# Patient Record
Sex: FEMALE | Race: BLACK OR AFRICAN AMERICAN | NOT HISPANIC OR LATINO | Employment: FULL TIME | URBAN - METROPOLITAN AREA
[De-identification: names, ages, dates, MRNs, and addresses within clinical notes are randomized per-mention and may not be internally consistent; named-entity substitution may affect disease eponyms.]

---

## 2017-01-03 ENCOUNTER — LAB VISIT (OUTPATIENT)
Dept: LAB | Facility: HOSPITAL | Age: 27
End: 2017-01-03
Attending: FAMILY MEDICINE
Payer: COMMERCIAL

## 2017-01-03 ENCOUNTER — OFFICE VISIT (OUTPATIENT)
Dept: FAMILY MEDICINE | Facility: CLINIC | Age: 27
End: 2017-01-03
Attending: FAMILY MEDICINE
Payer: COMMERCIAL

## 2017-01-03 VITALS
WEIGHT: 185.81 LBS | OXYGEN SATURATION: 98 % | DIASTOLIC BLOOD PRESSURE: 76 MMHG | HEIGHT: 65 IN | BODY MASS INDEX: 30.96 KG/M2 | HEART RATE: 95 BPM | SYSTOLIC BLOOD PRESSURE: 110 MMHG

## 2017-01-03 DIAGNOSIS — E04.1 THYROID NODULE: ICD-10-CM

## 2017-01-03 DIAGNOSIS — E04.1 THYROID NODULE: Primary | ICD-10-CM

## 2017-01-03 DIAGNOSIS — E55.9 VITAMIN D DEFICIENCY: ICD-10-CM

## 2017-01-03 PROCEDURE — 1159F MED LIST DOCD IN RCRD: CPT | Mod: S$GLB,,, | Performed by: FAMILY MEDICINE

## 2017-01-03 PROCEDURE — 36415 COLL VENOUS BLD VENIPUNCTURE: CPT | Mod: PO

## 2017-01-03 PROCEDURE — 99999 PR PBB SHADOW E&M-EST. PATIENT-LVL III: CPT | Mod: PBBFAC,,, | Performed by: FAMILY MEDICINE

## 2017-01-03 PROCEDURE — 84439 ASSAY OF FREE THYROXINE: CPT

## 2017-01-03 PROCEDURE — 84443 ASSAY THYROID STIM HORMONE: CPT

## 2017-01-03 PROCEDURE — 99213 OFFICE O/P EST LOW 20 MIN: CPT | Mod: S$GLB,,, | Performed by: FAMILY MEDICINE

## 2017-01-03 RX ORDER — VIT C/E/ZN/COPPR/LUTEIN/ZEAXAN 250MG-90MG
1000 CAPSULE ORAL DAILY
Refills: 0 | COMMUNITY
Start: 2017-01-03 | End: 2017-08-04 | Stop reason: ALTCHOICE

## 2017-01-03 NOTE — PROGRESS NOTES
"Subjective:       Patient ID: Jessica Garcia is a 26 y.o. female.    Chief Complaint: Follow-up (thyroid)    HPI     The patient returns for followup, from her visit in May.  At that time, I recommended additional labs, and a thyroid US.  She finally scheduled these tests for last week.  The thyroid US revealed a nodule in the midpole of the right thyroid lobe measuring 1.5 x 1.3 x 1.5 cm.  Lab studies showed a low vitamin D level.    The following portions of the patient's history were reviewed and updated as appropriate: allergies, current medications, past family history, past medical history, past social history, past surgical history and problem list.      Review of Systems   Constitutional: Positive for fatigue and unexpected weight change.   Respiratory: Negative for chest tightness and shortness of breath.    Cardiovascular: Negative for chest pain, palpitations and leg swelling.   Endocrine: Positive for heat intolerance. Negative for cold intolerance.   Neurological: Positive for dizziness. Negative for light-headedness and headaches.   Psychiatric/Behavioral: Negative for sleep disturbance. The patient is not nervous/anxious.          Objective:      Physical Exam   Constitutional: She appears well-developed.   HENT:   Head: Normocephalic and atraumatic.   Eyes: Conjunctivae are normal. No scleral icterus.   Neck: Neck supple. Thyromegaly present.   Lymphadenopathy:     She has cervical adenopathy (mild, submandibular ).   Neurological: She is alert.   Skin: Skin is warm and dry.   Psychiatric: She has a normal mood and affect.   Vitals reviewed.      Assessment:       1. Thyroid nodule    2. Vitamin D deficiency        Plan:       Refer to Endocrine Surgery for FNA.  VitD supplement (1000u daily).  Repeat TFTs today.    "This note will not be shared with the patient."  "

## 2017-01-04 ENCOUNTER — PATIENT MESSAGE (OUTPATIENT)
Dept: FAMILY MEDICINE | Facility: CLINIC | Age: 27
End: 2017-01-04

## 2017-01-04 LAB
T4 FREE SERPL-MCNC: 1.01 NG/DL
TSH SERPL DL<=0.005 MIU/L-ACNC: 0.79 UIU/ML

## 2017-01-05 ENCOUNTER — PATIENT MESSAGE (OUTPATIENT)
Dept: FAMILY MEDICINE | Facility: CLINIC | Age: 27
End: 2017-01-05

## 2017-01-06 DIAGNOSIS — E04.1 THYROID NODULE: Primary | ICD-10-CM

## 2017-01-06 NOTE — TELEPHONE ENCOUNTER
Any new patient trying to see Dr Gamez has to forward a message to her nurse which I've already done.

## 2017-01-06 NOTE — TELEPHONE ENCOUNTER
Please facilitate an urgent appointment with Dr. Villegas.  The patient has a thyroid nodule, needs a fine needle aspiration performed very soon.

## 2017-01-11 ENCOUNTER — TELEPHONE (OUTPATIENT)
Dept: SURGERY | Facility: CLINIC | Age: 27
End: 2017-01-11

## 2017-01-13 ENCOUNTER — TELEPHONE (OUTPATIENT)
Dept: FAMILY MEDICINE | Facility: CLINIC | Age: 27
End: 2017-01-13

## 2017-01-19 ENCOUNTER — TELEPHONE (OUTPATIENT)
Dept: SURGERY | Facility: CLINIC | Age: 27
End: 2017-01-19

## 2017-02-07 ENCOUNTER — OFFICE VISIT (OUTPATIENT)
Dept: FAMILY MEDICINE | Facility: CLINIC | Age: 27
End: 2017-02-07
Attending: FAMILY MEDICINE
Payer: COMMERCIAL

## 2017-02-07 VITALS
WEIGHT: 185.88 LBS | HEIGHT: 65 IN | TEMPERATURE: 99 F | SYSTOLIC BLOOD PRESSURE: 110 MMHG | HEART RATE: 107 BPM | DIASTOLIC BLOOD PRESSURE: 74 MMHG | OXYGEN SATURATION: 98 % | BODY MASS INDEX: 30.97 KG/M2

## 2017-02-07 DIAGNOSIS — J02.9 ACUTE PHARYNGITIS, UNSPECIFIED ETIOLOGY: Primary | ICD-10-CM

## 2017-02-07 PROCEDURE — 87070 CULTURE OTHR SPECIMN AEROBIC: CPT

## 2017-02-07 PROCEDURE — 99999 PR PBB SHADOW E&M-EST. PATIENT-LVL III: CPT | Mod: PBBFAC,,, | Performed by: FAMILY MEDICINE

## 2017-02-07 PROCEDURE — 99213 OFFICE O/P EST LOW 20 MIN: CPT | Mod: S$GLB,,, | Performed by: FAMILY MEDICINE

## 2017-02-07 RX ORDER — AMOXICILLIN 500 MG/1
1000 TABLET, FILM COATED ORAL 2 TIMES DAILY
Qty: 40 TABLET | Refills: 0 | Status: SHIPPED | OUTPATIENT
Start: 2017-02-07 | End: 2017-02-17

## 2017-02-07 NOTE — PATIENT INSTRUCTIONS
Gargle with warm salt water several times a day to reduce throat swelling and pain. Mix 1 teaspoon of salt in 8 ounces of water.   Drink extra fluids to soothe your throat. Honey or lemon in weak tea may help.   Get plenty of rest.   Takeover-the-counter medicine such as aspirin or ibuprofen to relieve pain and reduce fever.   Try throat lozenges that have a painkiller to numb your throat, such as Sucrets Maximum Strength. Regular cough drops may also help.   Try a decongestant. It can make breathing easier and relieve a runny nose and postnasal drip, which can make your throat hurt.       Call if:  * A high, prolonged fever (above 102 degrees)  * Symptoms that last for more than 10 days or get worse instead of better  * Trouble breathing or shortness of breath  * Pain or pressure in the chest  * Fainting or feeling like you are about to faint  * Confusion or disorientation  * Severe or persistent vomiting  * Severe pain in your face or forehead  * Hoarseness, sore throat or a cough that won't go away after 10 days

## 2017-02-07 NOTE — PROGRESS NOTES
"  Subjective:       Jessica Garcia is a 26 y.o. female who presents for evaluation of sinus pain. Symptoms include: congestion, cough, facial pain, nasal congestion, post nasal drip, sinus pressure, sneezing and sore throat. Onset of symptoms was 3 days ago. Symptoms have been gradually worsening since that time. Past history is significant for no history of pneumonia or bronchitis. Patient is a former smoker.    The following portions of the patient's history were reviewed and updated as appropriate: allergies, current medications, past family history, past medical history, past social history, past surgical history and problem list.      Review of Systems  Pertinent items are noted in HPI.     Objective:        Visit Vitals    /74 (BP Location: Right arm, Patient Position: Sitting, BP Method: Manual)    Pulse 107    Temp 98.8 °F (37.1 °C) (Oral)    Ht 5' 5" (1.651 m)    Wt 84.3 kg (185 lb 14.4 oz)    SpO2 98%    BMI 30.94 kg/m2     General appearance: alert, appears stated age and cooperative  Head: Normocephalic, without obvious abnormality, atraumatic  Ears: abnormal TM right ear - diminished mobility, erythematous and bulging and abnormal TM left ear - diminished mobility, erythematous and bulging  Nose: turbinates pink, swollen, edematous  Throat: abnormal findings: moderate oropharyngeal erythema and strawberry tongue  Neck: marked anterior cervical adenopathy, no carotid bruit, no JVD, supple, symmetrical, trachea midline and thyroid not enlarged, symmetric, no tenderness/mass/nodules  Lungs: clear to auscultation bilaterally  Heart: regular rate and rhythm, S1, S2 normal, no murmur, click, rub or gallop      Assessment:       No diagnosis found.  .      Plan:      Nasal saline sprays.    Amoxicillin per orders.  Await C+S.  DayQuil/NyQuil OTC.  Drink plenty fluids.  Rest; may return to work in 2 days if improves with treatment.  Follow-up by phone/email in 2 days.      "

## 2017-02-10 ENCOUNTER — PATIENT MESSAGE (OUTPATIENT)
Dept: FAMILY MEDICINE | Facility: CLINIC | Age: 27
End: 2017-02-10

## 2017-02-10 LAB — BACTERIA THROAT CULT: NORMAL

## 2017-07-15 ENCOUNTER — PATIENT MESSAGE (OUTPATIENT)
Dept: FAMILY MEDICINE | Facility: CLINIC | Age: 27
End: 2017-07-15

## 2017-07-18 NOTE — TELEPHONE ENCOUNTER
Would you like to address her concerns during her visit  on 8/3?  Or would you like her to schedule another appt since she has several issues that she is concerned about?

## 2017-08-04 ENCOUNTER — OFFICE VISIT (OUTPATIENT)
Dept: FAMILY MEDICINE | Facility: CLINIC | Age: 27
End: 2017-08-04
Attending: FAMILY MEDICINE
Payer: COMMERCIAL

## 2017-08-04 VITALS
WEIGHT: 180.69 LBS | HEART RATE: 101 BPM | HEIGHT: 65 IN | SYSTOLIC BLOOD PRESSURE: 116 MMHG | OXYGEN SATURATION: 97 % | DIASTOLIC BLOOD PRESSURE: 72 MMHG | BODY MASS INDEX: 30.1 KG/M2

## 2017-08-04 DIAGNOSIS — Z12.4 CERVICAL CANCER SCREENING: ICD-10-CM

## 2017-08-04 DIAGNOSIS — M51.26 DISC DISPLACEMENT, LUMBAR: ICD-10-CM

## 2017-08-04 DIAGNOSIS — M79.641 PAIN IN BOTH HANDS: ICD-10-CM

## 2017-08-04 DIAGNOSIS — Z01.419 ROUTINE GYNECOLOGICAL EXAMINATION: Primary | ICD-10-CM

## 2017-08-04 DIAGNOSIS — E78.00 HYPERCHOLESTEREMIA: ICD-10-CM

## 2017-08-04 DIAGNOSIS — M79.642 PAIN IN BOTH HANDS: ICD-10-CM

## 2017-08-04 DIAGNOSIS — N89.8 VAGINAL DISCHARGE: ICD-10-CM

## 2017-08-04 DIAGNOSIS — R53.83 FATIGUE, UNSPECIFIED TYPE: ICD-10-CM

## 2017-08-04 DIAGNOSIS — M54.50 CHRONIC RIGHT-SIDED LOW BACK PAIN WITHOUT SCIATICA: ICD-10-CM

## 2017-08-04 DIAGNOSIS — G89.29 CHRONIC RIGHT-SIDED LOW BACK PAIN WITHOUT SCIATICA: ICD-10-CM

## 2017-08-04 LAB
CANDIDA RRNA VAG QL PROBE: NEGATIVE
G VAGINALIS RRNA GENITAL QL PROBE: NEGATIVE
T VAGINALIS RRNA GENITAL QL PROBE: NEGATIVE

## 2017-08-04 PROCEDURE — 99395 PREV VISIT EST AGE 18-39: CPT | Mod: S$GLB,,, | Performed by: FAMILY MEDICINE

## 2017-08-04 PROCEDURE — 87660 TRICHOMONAS VAGIN DIR PROBE: CPT

## 2017-08-04 PROCEDURE — 99999 PR PBB SHADOW E&M-EST. PATIENT-LVL III: CPT | Mod: PBBFAC,,, | Performed by: FAMILY MEDICINE

## 2017-08-04 PROCEDURE — 3008F BODY MASS INDEX DOCD: CPT | Mod: S$GLB,,, | Performed by: FAMILY MEDICINE

## 2017-08-04 PROCEDURE — 88175 CYTOPATH C/V AUTO FLUID REDO: CPT

## 2017-08-04 PROCEDURE — 87480 CANDIDA DNA DIR PROBE: CPT

## 2017-08-04 NOTE — PROGRESS NOTES
"Subjective:       Patient ID: Jessica Garcia is a 26 y.o. female.    Chief Complaint: Gynecologic Exam    HPI     The patient presents to the office today requesting a routine gynecologic examination.    The following portions of the patient's history were reviewed and updated as appropriate: allergies, current medications, past family history, past medical history, past social history, past surgical history and problem list.    OB History      Para Term  AB Living    0 0 0 0 0 0    SAB TAB Ectopic Multiple Live Births    0 0 0 0          Obstetric Comments    Menarche age 13.   Menses absent on OCPs.  History of abnormal PAP smear: NO.  History of sexually transmitted disease:  NO              Review of Systems   Constitutional: Positive for fatigue.   Musculoskeletal: Positive for arthralgias and back pain (history of bulging lumbar disc).   Psychiatric/Behavioral: Positive for sleep disturbance (difficiculty with induction; rare early awakening).     Patient concerned, due to family history of "fibromyalgia."      Objective:      Physical Exam   Constitutional: She is oriented to person, place, and time. She appears well-developed and well-nourished. She is cooperative.   HENT:   Head: Normocephalic and atraumatic.   Nose: Nose normal.   Mouth/Throat: Oropharynx is clear and moist and mucous membranes are normal. No oropharyngeal exudate.   Eyes: Conjunctivae are normal. No scleral icterus.   Neck: Neck supple. No JVD present. Carotid bruit is not present. No thyromegaly present.   Cardiovascular: Normal rate, regular rhythm and normal pulses.  Exam reveals no gallop and no friction rub.    No murmur heard.  Pulmonary/Chest: Effort normal and breath sounds normal. She has no wheezes. She has no rhonchi. She has no rales.   Abdominal: Soft. Bowel sounds are normal. She exhibits no distension and no mass. There is no splenomegaly or hepatomegaly. There is no tenderness.   Genitourinary: Uterus " "normal. No breast tenderness or discharge. There is no rash or tenderness on the right labia. There is no rash or tenderness on the left labia. Cervix exhibits no motion tenderness and no discharge. Right adnexum displays no mass, no tenderness and no fullness. Left adnexum displays no mass, no tenderness and no fullness. No tenderness in the vagina. Vaginal discharge found.   Musculoskeletal: Normal range of motion. She exhibits no edema or tenderness.   Lymphadenopathy:     She has no cervical adenopathy.     She has no axillary adenopathy.   Neurological: She is alert and oriented to person, place, and time. She has normal strength and normal reflexes. No cranial nerve deficit or sensory deficit.   Skin: Skin is warm and dry.   Psychiatric: She has a normal mood and affect. Her speech is normal.   Vitals reviewed.        Assessment:       1. Routine gynecological examination    2. Cervical cancer screening    3. Vaginal discharge    4. Disc displacement, lumbar    5. Chronic right-sided low back pain without sciatica    6. Pain in both hands    7. Fatigue, unspecified type    8. Hypercholesteremia          Plan:       Await PAP results.  TdaP today.  Labs (see Orders).  Discussed home exercise program; will refer for PT evaluation.      "This note will not be shared with the patient."  "

## 2017-08-07 ENCOUNTER — LAB VISIT (OUTPATIENT)
Dept: LAB | Facility: HOSPITAL | Age: 27
End: 2017-08-07
Attending: FAMILY MEDICINE
Payer: COMMERCIAL

## 2017-08-07 DIAGNOSIS — M79.642 PAIN IN BOTH HANDS: ICD-10-CM

## 2017-08-07 DIAGNOSIS — E78.00 HYPERCHOLESTEREMIA: ICD-10-CM

## 2017-08-07 DIAGNOSIS — M79.641 PAIN IN BOTH HANDS: ICD-10-CM

## 2017-08-07 LAB
ERYTHROCYTE [SEDIMENTATION RATE] IN BLOOD BY WESTERGREN METHOD: 27 MM/HR
ESTIMATED AVG GLUCOSE: 105 MG/DL
HBA1C MFR BLD HPLC: 5.3 %
INSULIN COLLECTION INTERVAL: 1
INSULIN SERPL-ACNC: 14.6 UU/ML
RHEUMATOID FACT SERPL-ACNC: <10 IU/ML

## 2017-08-07 PROCEDURE — 86038 ANTINUCLEAR ANTIBODIES: CPT

## 2017-08-07 PROCEDURE — 80061 LIPID PANEL: CPT

## 2017-08-07 PROCEDURE — 83036 HEMOGLOBIN GLYCOSYLATED A1C: CPT

## 2017-08-07 PROCEDURE — 83704 LIPOPROTEIN BLD QUAN PART: CPT

## 2017-08-07 PROCEDURE — 86431 RHEUMATOID FACTOR QUANT: CPT

## 2017-08-07 PROCEDURE — 36415 COLL VENOUS BLD VENIPUNCTURE: CPT | Mod: PO

## 2017-08-07 PROCEDURE — 85651 RBC SED RATE NONAUTOMATED: CPT

## 2017-08-07 PROCEDURE — 83525 ASSAY OF INSULIN: CPT

## 2017-08-09 LAB
ANA SER QL IF: NORMAL
CHOLEST SERPL-MCNC: 228 MG/DL (ref 100–199)
HDL SERPL QN: 57 NM
HDL SERPL QN: 8.5 NM
HDL SERPL-SCNC: 22.3 UMOL/L
HDLC SERPL-MCNC: 37 MG/DL
HLD.LARGE SERPL-SCNC: <1.3 UMOL/L
LDL MED SERPL QN: 21.3 NM
LDL SERPL QN: 21.3 NM
LDL SERPL-SCNC: 2289 NMOL/L
LDL SMALL SERPL-SCNC: 1010 NMOL/L
LDL SMALL SERPL-SCNC: 1010 NMOL/L
LDLC SERPL CALC-MCNC: 168 MG/DL (ref 0–99)
TRIGL SERPL-MCNC: 114 MG/DL (ref 0–149)
VLDL LARGE SERPL-SCNC: 1.8 NMOL/L
VLDL SERPL QN: 44.1 NM

## 2017-08-10 ENCOUNTER — PATIENT MESSAGE (OUTPATIENT)
Dept: FAMILY MEDICINE | Facility: CLINIC | Age: 27
End: 2017-08-10

## 2017-08-10 DIAGNOSIS — Z91.89 FRAMINGHAM CARDIAC RISK <10% IN NEXT 10 YEARS: ICD-10-CM

## 2018-05-29 ENCOUNTER — TELEPHONE (OUTPATIENT)
Dept: FAMILY MEDICINE | Facility: CLINIC | Age: 28
End: 2018-05-29

## 2018-05-29 ENCOUNTER — OFFICE VISIT (OUTPATIENT)
Dept: URGENT CARE | Facility: CLINIC | Age: 28
End: 2018-05-29
Payer: COMMERCIAL

## 2018-05-29 VITALS
BODY MASS INDEX: 29.99 KG/M2 | DIASTOLIC BLOOD PRESSURE: 77 MMHG | OXYGEN SATURATION: 96 % | HEIGHT: 65 IN | SYSTOLIC BLOOD PRESSURE: 123 MMHG | TEMPERATURE: 98 F | HEART RATE: 114 BPM | WEIGHT: 180 LBS | RESPIRATION RATE: 18 BRPM

## 2018-05-29 DIAGNOSIS — J20.9 ACUTE BRONCHITIS, UNSPECIFIED ORGANISM: Primary | ICD-10-CM

## 2018-05-29 DIAGNOSIS — R05.9 COUGH: ICD-10-CM

## 2018-05-29 PROCEDURE — 99214 OFFICE O/P EST MOD 30 MIN: CPT | Mod: S$GLB,,, | Performed by: NURSE PRACTITIONER

## 2018-05-29 RX ORDER — NAPROXEN 500 MG/1
TABLET ORAL
COMMUNITY
Start: 2018-04-26 | End: 2018-09-14

## 2018-05-29 RX ORDER — CYCLOBENZAPRINE HCL 10 MG
10 TABLET ORAL
COMMUNITY
Start: 2018-04-26 | End: 2019-01-06

## 2018-05-29 RX ORDER — PROMETHAZINE HYDROCHLORIDE AND DEXTROMETHORPHAN HYDROBROMIDE 6.25; 15 MG/5ML; MG/5ML
5 SYRUP ORAL NIGHTLY PRN
Qty: 120 ML | Refills: 0 | Status: SHIPPED | OUTPATIENT
Start: 2018-05-29 | End: 2018-09-14

## 2018-05-29 RX ORDER — AZITHROMYCIN 250 MG/1
TABLET, FILM COATED ORAL
Qty: 6 TABLET | Refills: 0 | Status: SHIPPED | OUTPATIENT
Start: 2018-05-29 | End: 2018-09-14

## 2018-05-29 RX ORDER — PREDNISONE 20 MG/1
20 TABLET ORAL DAILY
Qty: 3 TABLET | Refills: 0 | Status: SHIPPED | OUTPATIENT
Start: 2018-05-29 | End: 2018-06-01

## 2018-05-29 NOTE — TELEPHONE ENCOUNTER
Patient called states compliant of productive cough. The states no complaint of fever. Patient states last Tuesday she had a 101.5 degree temperature. The patient states he took a tylenol and the fever broke. Due to lack of availability for the next few weeks with Dr. Cobian, my recommendation to the patient was to seek medical attention at one of [facility].

## 2018-05-30 NOTE — PROGRESS NOTES
"Subjective:       Patient ID: Jessica Garcia is a 27 y.o. female.    Vitals:    05/29/18 1952   BP: 123/77   Pulse: (!) 114   Resp: 18   Temp: 97.9 °F (36.6 °C)   TempSrc: Oral   SpO2: 96%   Weight: 81.6 kg (180 lb)   Height: 5' 5" (1.651 m)       Chief Complaint: Cough    Pt presents for a chest xray because she has had a bad cough. Advised by PCP.     The patient presents to the clinic today for a chest xray. She contacted her primary care doctor and he advised her to go to any urgent care to get a chest xray since he was unable to see her. She complains of unproductive cough x 1 week she claims "it feels like something is tickling my throat so it makes me cough." Denies fever or chills. She denies headaches or sinus congestion. She denies chest pain and SOB. Denies rashes.       Cough   This is a new problem. The current episode started in the past 7 days. The problem has been waxing and waning. The problem occurs constantly. The cough is productive of sputum. Pertinent negatives include no chest pain, chills, ear pain, eye redness, fever, headaches, myalgias, sore throat, shortness of breath or wheezing. Nothing aggravates the symptoms. Treatments tried: thera flu, throat spray, mucinex. The treatment provided no relief.     Review of Systems   Constitution: Negative for chills, fever and malaise/fatigue.   HENT: Negative for congestion, ear pain, hoarse voice and sore throat.    Eyes: Negative for discharge and redness.   Cardiovascular: Negative for chest pain, dyspnea on exertion and leg swelling.   Respiratory: Positive for cough and sputum production. Negative for shortness of breath and wheezing.    Musculoskeletal: Negative for myalgias.   Gastrointestinal: Negative for abdominal pain and nausea.   Neurological: Negative for headaches.       Objective:      Physical Exam   Constitutional: She is oriented to person, place, and time. She appears well-developed and well-nourished. She is " "cooperative.  Non-toxic appearance. She does not appear ill. No distress. She is not intubated.   HENT:   Head: Normocephalic and atraumatic.   Right Ear: Tympanic membrane, external ear and ear canal normal.   Left Ear: Tympanic membrane, external ear and ear canal normal.   Nose: Rhinorrhea present. No mucosal edema or nasal deformity. No epistaxis. Right sinus exhibits no maxillary sinus tenderness and no frontal sinus tenderness. Left sinus exhibits no maxillary sinus tenderness and no frontal sinus tenderness.   Mouth/Throat: Uvula is midline and mucous membranes are normal. Normal dentition. No uvula swelling. Posterior oropharyngeal erythema ("cobblestone appearance") present. No oropharyngeal exudate, posterior oropharyngeal edema or tonsillar abscesses. Tonsils are 1+ on the right. Tonsils are 1+ on the left. No tonsillar exudate.   Eyes: Conjunctivae and lids are normal. No scleral icterus.   Sclera clear bilat   Neck: Trachea normal, full passive range of motion without pain and phonation normal. Neck supple.   Cardiovascular: Normal rate, regular rhythm, normal heart sounds, intact distal pulses and normal pulses.    Pulmonary/Chest: Effort normal and breath sounds normal. No accessory muscle usage. No apnea, no tachypnea and no bradypnea. She is not intubated. No respiratory distress.           Abdominal: Normal appearance.   Musculoskeletal: Normal range of motion. She exhibits no edema or deformity.   Lymphadenopathy:        Head (right side): Submental and submandibular adenopathy present.        Head (left side): No submental and no submandibular adenopathy present.   Neurological: She is alert and oriented to person, place, and time. She exhibits normal muscle tone. Coordination normal.   Skin: Skin is warm, dry and intact. She is not diaphoretic. No pallor.   Psychiatric: She has a normal mood and affect. Her speech is normal and behavior is normal. Judgment and thought content normal. Cognition " and memory are normal.   Nursing note and vitals reviewed.        Reading Physician Reading Date Result Priority   Domenico Rodriguez MD 5/29/2018    Narrative     EXAMINATION:  XR CHEST PA AND LATERAL    CLINICAL HISTORY:  Cough    TECHNIQUE:  PA and lateral views of the chest were performed.    COMPARISON:  None    FINDINGS:  The trachea is unremarkable.  The cardiomediastinal silhouette is within normal limits.  The hemidiaphragms are unremarkable.  There is no evidence of free air beneath the hemidiaphragms.  There are no pleural effusions.  There is no evidence of a pneumothorax.  There is no evidence of pneumomediastinum.  No airspace opacities are present.  The osseous structures are unremarkable.      Impression       No acute process.       Assessment:       1. Acute bronchitis, unspecified organism    2. Cough        Plan:       Patient is advised that her chest xray was negative. Her lungs are CTA on exam. No wheezing present. She is placed on antibiotics and steroids. Given cough syrup for night. Also advised to start zyrtec and flonase. She is advised to follow up with her pcp.       Jessica was seen today for cough.    Diagnoses and all orders for this visit:    Acute bronchitis, unspecified organism  -     azithromycin (Z-ARIELLA) 250 MG tablet; Take 2 tablets by mouth on day 1; Take 1 tablet by mouth on days 2-5  -     predniSONE (DELTASONE) 20 MG tablet; Take 1 tablet (20 mg total) by mouth once daily.    Cough  -     X-Ray Chest PA And Lateral; Future  -     azithromycin (Z-ARIELLA) 250 MG tablet; Take 2 tablets by mouth on day 1; Take 1 tablet by mouth on days 2-5  -     predniSONE (DELTASONE) 20 MG tablet; Take 1 tablet (20 mg total) by mouth once daily.  -     promethazine-dextromethorphan (PROMETHAZINE-DM) 6.25-15 mg/5 mL Syrp; Take 5 mLs by mouth nightly as needed.      Patient Instructions     Bronchitis, Antibiotic Treatment (Adult)    Bronchitis is an infection of the air passages (bronchial tubes) in  your lungs. It often occurs when you have a cold. This illness is contagious during the first few days and is spread through the air by coughing and sneezing, or by direct contact (touching the sick person and then touching your own eyes, nose, or mouth).  Symptoms of bronchitis include cough with mucus (phlegm) and low-grade fever. Bronchitis usually lasts 7 to 14 days. Mild cases can be treated with simple home remedies. More severe infection is treated with an antibiotic.  Home care  Follow these guidelines when caring for yourself at home:  · If your symptoms are severe, rest at home for the first 2 to 3 days. When you go back to your usual activities, don't let yourself get too tired.  · Do not smoke. Also avoid being exposed to secondhand smoke.  · You may use over-the-counter medicines to control fever or pain, unless another medicine was prescribed. (Note: If you have chronic liver or kidney disease or have ever had a stomach ulcer or gastrointestinal bleeding, talk with your healthcare provider before using these medicines. Also talk to your provider if you are taking medicine to prevent blood clots.) Aspirin should never be given to anyone younger than 18 years of age who is ill with a viral infection or fever. It may cause severe liver or brain damage.  · Your appetite may be poor, so a light diet is fine. Avoid dehydration by drinking 6 to 8 glasses of fluids per day (such as water, soft drinks, sports drinks, juices, tea, or soup). Extra fluids will help loosen secretions in the nose and lungs.  · Over-the-counter cough, cold, and sore-throat medicines will not shorten the length of the illness, but they may be helpful to reduce symptoms. (Note: Do not use decongestants if you have high blood pressure.)  · Finish all antibiotic medicine. Do this even if you are feeling better after only a few days.  Follow-up care  Follow up with your healthcare provider, or as advised. If you had an X-ray or ECG  (electrocardiogram), a specialist will review it. You will be notified of any new findings that may affect your care.  Note: If you are age 65 or older, or if you have a chronic lung disease or condition that affects your immune system, or you smoke, talk to your healthcare provider about having pneumococcal vaccinations and a yearly influenza vaccination (flu shot).  When to seek medical advice  Call your healthcare provider right away if any of these occur:  · Fever of 100.4°F (38°C) or higher  · Coughing up increased amounts of colored sputum  · Weakness, drowsiness, headache, facial pain, ear pain, or a stiff neck  Call 911, or get immediate medical care  Contact emergency services right away if any of these occur.  · Coughing up blood  · Worsening weakness, drowsiness, headache, or stiff neck  · Trouble breathing, wheezing, or pain with breathing  Date Last Reviewed: 9/13/2015  © 5598-7847 [employer]. [address] [place] [address]. All rights reserved. This information is not intended as a substitute for professional medical care. Always follow your healthcare professional's instructions.      -As we discussed today your chest xray was negative.  -Start taking and complete the antibiotics that are prescribed.  -you have also been given 3 days of steroids.  -You may also take the cough syrup as needed at night for the cough.  -you may also take Claritin-d or Zyrtec-d as well as flonase.  -Follow up with PCP.  -If symptoms worsen go to the ER.

## 2018-05-30 NOTE — PATIENT INSTRUCTIONS
Bronchitis, Antibiotic Treatment (Adult)    Bronchitis is an infection of the air passages (bronchial tubes) in your lungs. It often occurs when you have a cold. This illness is contagious during the first few days and is spread through the air by coughing and sneezing, or by direct contact (touching the sick person and then touching your own eyes, nose, or mouth).  Symptoms of bronchitis include cough with mucus (phlegm) and low-grade fever. Bronchitis usually lasts 7 to 14 days. Mild cases can be treated with simple home remedies. More severe infection is treated with an antibiotic.  Home care  Follow these guidelines when caring for yourself at home:  · If your symptoms are severe, rest at home for the first 2 to 3 days. When you go back to your usual activities, don't let yourself get too tired.  · Do not smoke. Also avoid being exposed to secondhand smoke.  · You may use over-the-counter medicines to control fever or pain, unless another medicine was prescribed. (Note: If you have chronic liver or kidney disease or have ever had a stomach ulcer or gastrointestinal bleeding, talk with your healthcare provider before using these medicines. Also talk to your provider if you are taking medicine to prevent blood clots.) Aspirin should never be given to anyone younger than 18 years of age who is ill with a viral infection or fever. It may cause severe liver or brain damage.  · Your appetite may be poor, so a light diet is fine. Avoid dehydration by drinking 6 to 8 glasses of fluids per day (such as water, soft drinks, sports drinks, juices, tea, or soup). Extra fluids will help loosen secretions in the nose and lungs.  · Over-the-counter cough, cold, and sore-throat medicines will not shorten the length of the illness, but they may be helpful to reduce symptoms. (Note: Do not use decongestants if you have high blood pressure.)  · Finish all antibiotic medicine. Do this even if you are feeling better after only a  few days.  Follow-up care  Follow up with your healthcare provider, or as advised. If you had an X-ray or ECG (electrocardiogram), a specialist will review it. You will be notified of any new findings that may affect your care.  Note: If you are age 65 or older, or if you have a chronic lung disease or condition that affects your immune system, or you smoke, talk to your healthcare provider about having pneumococcal vaccinations and a yearly influenza vaccination (flu shot).  When to seek medical advice  Call your healthcare provider right away if any of these occur:  · Fever of 100.4°F (38°C) or higher  · Coughing up increased amounts of colored sputum  · Weakness, drowsiness, headache, facial pain, ear pain, or a stiff neck  Call 911, or get immediate medical care  Contact emergency services right away if any of these occur.  · Coughing up blood  · Worsening weakness, drowsiness, headache, or stiff neck  · Trouble breathing, wheezing, or pain with breathing  Date Last Reviewed: 9/13/2015  © 1279-4555 [employer]. [address] [place] [address]. All rights reserved. This information is not intended as a substitute for professional medical care. Always follow your healthcare professional's instructions.      -As we discussed today your chest xray was negative.  -Start taking and complete the antibiotics that are prescribed.  -you have also been given 3 days of steroids.  -You may also take the cough syrup as needed at night for the cough.  -you may also take Claritin-d or Zyrtec-d as well as flonase.  -Follow up with PCP.  -If symptoms worsen go to the ER.

## 2018-08-02 ENCOUNTER — PATIENT MESSAGE (OUTPATIENT)
Dept: FAMILY MEDICINE | Facility: CLINIC | Age: 28
End: 2018-08-02

## 2018-08-31 ENCOUNTER — PATIENT MESSAGE (OUTPATIENT)
Dept: FAMILY MEDICINE | Facility: CLINIC | Age: 28
End: 2018-08-31

## 2018-08-31 NOTE — TELEPHONE ENCOUNTER
Written report has been placed on Dr. Cobian's desk as well as Brittani REED mailed the disc to [employer].

## 2018-09-10 ENCOUNTER — PATIENT MESSAGE (OUTPATIENT)
Dept: FAMILY MEDICINE | Facility: CLINIC | Age: 28
End: 2018-09-10

## 2018-09-14 ENCOUNTER — OFFICE VISIT (OUTPATIENT)
Dept: FAMILY MEDICINE | Facility: CLINIC | Age: 28
End: 2018-09-14
Attending: FAMILY MEDICINE
Payer: COMMERCIAL

## 2018-09-14 VITALS
HEIGHT: 65 IN | SYSTOLIC BLOOD PRESSURE: 100 MMHG | OXYGEN SATURATION: 98 % | WEIGHT: 191.81 LBS | BODY MASS INDEX: 31.96 KG/M2 | HEART RATE: 93 BPM | DIASTOLIC BLOOD PRESSURE: 60 MMHG

## 2018-09-14 DIAGNOSIS — M46.1 SACROILIITIS: Primary | ICD-10-CM

## 2018-09-14 DIAGNOSIS — M70.61 GREATER TROCHANTERIC BURSITIS OF RIGHT HIP: ICD-10-CM

## 2018-09-14 DIAGNOSIS — G57.01 PIRIFORMIS SYNDROME, RIGHT: ICD-10-CM

## 2018-09-14 DIAGNOSIS — M41.9 SCOLIOSIS OF THORACOLUMBAR SPINE, UNSPECIFIED SCOLIOSIS TYPE: ICD-10-CM

## 2018-09-14 PROCEDURE — 99214 OFFICE O/P EST MOD 30 MIN: CPT | Mod: S$GLB,,, | Performed by: FAMILY MEDICINE

## 2018-09-14 PROCEDURE — 99999 PR PBB SHADOW E&M-EST. PATIENT-LVL III: CPT | Mod: PBBFAC,,, | Performed by: FAMILY MEDICINE

## 2018-09-14 PROCEDURE — 3008F BODY MASS INDEX DOCD: CPT | Mod: CPTII,S$GLB,, | Performed by: FAMILY MEDICINE

## 2018-09-14 RX ORDER — NAPROXEN 500 MG/1
500 TABLET ORAL 2 TIMES DAILY WITH MEALS
Qty: 30 TABLET | Refills: 1 | Status: SHIPPED | OUTPATIENT
Start: 2018-09-14 | End: 2019-01-06

## 2018-09-14 RX ORDER — IBUPROFEN 800 MG/1
800 TABLET ORAL
COMMUNITY
End: 2020-12-03

## 2018-09-14 RX ORDER — BACLOFEN 10 MG/1
10 TABLET ORAL 3 TIMES DAILY
Qty: 30 TABLET | Refills: 0 | Status: SHIPPED | OUTPATIENT
Start: 2018-09-14 | End: 2019-01-06

## 2018-09-14 NOTE — PROGRESS NOTES
"Subjective:       Patient ID: Jessica Garcia is a 27 y.o. female.    Chief Complaint: Back Pain    Back Pain   This is a chronic problem. The current episode started more than 1 year ago. The problem occurs intermittently. The problem has been waxing and waning since onset. The pain is present in the lumbar spine and gluteal. The quality of the pain is described as stabbing and burning. Radiates to: right buttock/hip. The pain is at a severity of 8/10. The pain is severe. The pain is the same all the time. The symptoms are aggravated by bending, position and twisting. Stiffness is present all day. Pertinent negatives include no bladder incontinence, bowel incontinence, dysuria, numbness, paresthesias, perianal numbness or tingling. Risk factors include obesity. She has tried ice, heat, home exercises and NSAIDs for the symptoms. The treatment provided mild relief.     As a teenager, she was diagnosed with a "bulging disc in my back."  No history of trauma.    Patient Active Problem List   Diagnosis    Thyroid nodule    Vitamin D deficiency    Disc displacement, lumbar    [place] cardiac risk <10% in next 10 years       Current Outpatient Medications:     cyclobenzaprine (FLEXERIL) 10 MG tablet, Take 10 mg by mouth as needed. , Disp: , Rfl:     ibuprofen (ADVIL,MOTRIN) 800 MG tablet, Take 800 mg by mouth as needed for Pain., Disp: , Rfl:     norethindrone-e.estradiol-iron (LO LOESTRIN FE) 1 mg-10 mcg (24)/10 mcg (2) Tab, Take by mouth., Disp: , Rfl:     The following portions of the patient's history were reviewed and updated as appropriate: allergies, past family history, past medical history, past social history and past surgical history.    Review of Systems   Gastrointestinal: Negative for bowel incontinence.   Genitourinary: Negative for bladder incontinence and dysuria.   Musculoskeletal: Positive for back pain.   Neurological: Negative for tingling, numbness and paresthesias.       Objective:    " "  /60 (BP Location: Left arm, Patient Position: Sitting, BP Method: Small (Manual))   Pulse 93   Ht 5' 5" (1.651 m)   Wt 87 kg (191 lb 12.8 oz)   SpO2 98%   BMI 31.92 kg/m²     Physical Exam   Constitutional: She is oriented to person, place, and time. She appears well-developed and well-nourished.   HENT:   Head: Normocephalic and atraumatic.   Musculoskeletal:        Right hip: She exhibits tenderness.        Thoracic back: She exhibits deformity (Mild rotational scoliosis) and spasm (paravertebral mm).        Lumbar back: She exhibits bony tenderness (right SI joint) and pain. She exhibits normal range of motion and no spasm.   Mildly positive FADIR and ANTHONY tests.  Pain on piriformis stretch.  Tender over right greater trochanteric bursa.   Neurological: She is alert and oriented to person, place, and time.   Skin: Skin is warm and dry.   Psychiatric: She has a normal mood and affect.   Vitals reviewed.        Assessment:       1. Sacroiliitis    2. Scoliosis of thoracolumbar spine, unspecified scoliosis type    3. Greater trochanteric bursitis of right hip    4. Piriformis syndrome, right        Plan:       Declines TdaP and flu vaccines.  Trial of baclofen and naproxen.  Refer to PT.        "This note will not be shared with the patient."  "

## 2019-01-06 ENCOUNTER — HOSPITAL ENCOUNTER (EMERGENCY)
Facility: HOSPITAL | Age: 29
Discharge: HOME | End: 2019-01-06
Attending: EMERGENCY MEDICINE
Payer: COMMERCIAL

## 2019-01-06 VITALS
HEIGHT: 65 IN | SYSTOLIC BLOOD PRESSURE: 117 MMHG | OXYGEN SATURATION: 95 % | WEIGHT: 180 LBS | TEMPERATURE: 99 F | HEART RATE: 100 BPM | RESPIRATION RATE: 20 BRPM | BODY MASS INDEX: 29.99 KG/M2 | DIASTOLIC BLOOD PRESSURE: 70 MMHG

## 2019-01-06 DIAGNOSIS — J11.1 INFLUENZA: Primary | ICD-10-CM

## 2019-01-06 DIAGNOSIS — R05.9 COUGH: ICD-10-CM

## 2019-01-06 DIAGNOSIS — R50.9 FEVER: ICD-10-CM

## 2019-01-06 DIAGNOSIS — R07.9 CHEST PAIN: ICD-10-CM

## 2019-01-06 LAB
ALBUMIN SERPL BCP-MCNC: 3.5 G/DL
ALP SERPL-CCNC: 44 U/L
ALT SERPL W/O P-5'-P-CCNC: 26 U/L
ANION GAP SERPL CALC-SCNC: 11 MMOL/L
AST SERPL-CCNC: 28 U/L
B-HCG UR QL: NEGATIVE
BACTERIA #/AREA URNS AUTO: NORMAL /HPF
BASOPHILS # BLD AUTO: 0.03 K/UL
BASOPHILS NFR BLD: 0.5 %
BILIRUB SERPL-MCNC: 0.4 MG/DL
BILIRUB UR QL STRIP: NEGATIVE
BUN SERPL-MCNC: 7 MG/DL
CALCIUM SERPL-MCNC: 9.3 MG/DL
CHLORIDE SERPL-SCNC: 105 MMOL/L
CLARITY UR REFRACT.AUTO: ABNORMAL
CO2 SERPL-SCNC: 23 MMOL/L
COLOR UR AUTO: YELLOW
CREAT SERPL-MCNC: 0.7 MG/DL
CTP QC/QA: YES
DIFFERENTIAL METHOD: ABNORMAL
EOSINOPHIL # BLD AUTO: 0 K/UL
EOSINOPHIL NFR BLD: 0.3 %
ERYTHROCYTE [DISTWIDTH] IN BLOOD BY AUTOMATED COUNT: 12.9 %
EST. GFR  (AFRICAN AMERICAN): >60 ML/MIN/1.73 M^2
EST. GFR  (NON AFRICAN AMERICAN): >60 ML/MIN/1.73 M^2
GLUCOSE SERPL-MCNC: 98 MG/DL
GLUCOSE UR QL STRIP: NEGATIVE
HCT VFR BLD AUTO: 41.6 %
HGB BLD-MCNC: 13.4 G/DL
HGB UR QL STRIP: ABNORMAL
HYALINE CASTS UR QL AUTO: 0 /LPF
IMM GRANULOCYTES # BLD AUTO: 0.04 K/UL
IMM GRANULOCYTES NFR BLD AUTO: 0.6 %
KETONES UR QL STRIP: NEGATIVE
LACTATE SERPL-SCNC: 0.9 MMOL/L
LEUKOCYTE ESTERASE UR QL STRIP: NEGATIVE
LYMPHOCYTES # BLD AUTO: 0.4 K/UL
LYMPHOCYTES NFR BLD: 6.2 %
MCH RBC QN AUTO: 30 PG
MCHC RBC AUTO-ENTMCNC: 32.2 G/DL
MCV RBC AUTO: 93 FL
MICROSCOPIC COMMENT: NORMAL
MONOCYTES # BLD AUTO: 0.7 K/UL
MONOCYTES NFR BLD: 10.9 %
NEUTROPHILS # BLD AUTO: 5.2 K/UL
NEUTROPHILS NFR BLD: 81.5 %
NITRITE UR QL STRIP: NEGATIVE
NRBC BLD-RTO: 0 /100 WBC
PH UR STRIP: 5 [PH] (ref 5–8)
PLATELET # BLD AUTO: 335 K/UL
PMV BLD AUTO: 9.7 FL
POTASSIUM SERPL-SCNC: 3.7 MMOL/L
PROT SERPL-MCNC: 8 G/DL
PROT UR QL STRIP: ABNORMAL
RBC # BLD AUTO: 4.46 M/UL
RBC #/AREA URNS AUTO: 2 /HPF (ref 0–4)
SODIUM SERPL-SCNC: 139 MMOL/L
SP GR UR STRIP: 1.03 (ref 1–1.03)
SQUAMOUS #/AREA URNS AUTO: 5 /HPF
URN SPEC COLLECT METH UR: ABNORMAL
WBC # BLD AUTO: 6.32 K/UL
WBC #/AREA URNS AUTO: 4 /HPF (ref 0–5)

## 2019-01-06 PROCEDURE — 83605 ASSAY OF LACTIC ACID: CPT

## 2019-01-06 PROCEDURE — 25000003 PHARM REV CODE 250: Performed by: EMERGENCY MEDICINE

## 2019-01-06 PROCEDURE — 93010 EKG 12-LEAD: ICD-10-PCS | Mod: ,,, | Performed by: INTERNAL MEDICINE

## 2019-01-06 PROCEDURE — 93005 ELECTROCARDIOGRAM TRACING: CPT

## 2019-01-06 PROCEDURE — 99284 EMERGENCY DEPT VISIT MOD MDM: CPT | Mod: 25

## 2019-01-06 PROCEDURE — 80053 COMPREHEN METABOLIC PANEL: CPT

## 2019-01-06 PROCEDURE — 63600175 PHARM REV CODE 636 W HCPCS: Performed by: EMERGENCY MEDICINE

## 2019-01-06 PROCEDURE — 96375 TX/PRO/DX INJ NEW DRUG ADDON: CPT

## 2019-01-06 PROCEDURE — 81001 URINALYSIS AUTO W/SCOPE: CPT

## 2019-01-06 PROCEDURE — 96374 THER/PROPH/DIAG INJ IV PUSH: CPT

## 2019-01-06 PROCEDURE — 85025 COMPLETE CBC W/AUTO DIFF WBC: CPT

## 2019-01-06 PROCEDURE — 81025 URINE PREGNANCY TEST: CPT | Performed by: EMERGENCY MEDICINE

## 2019-01-06 PROCEDURE — 96361 HYDRATE IV INFUSION ADD-ON: CPT

## 2019-01-06 PROCEDURE — 99284 PR EMERGENCY DEPT VISIT,LEVEL IV: ICD-10-PCS | Mod: ,,, | Performed by: EMERGENCY MEDICINE

## 2019-01-06 PROCEDURE — 99284 EMERGENCY DEPT VISIT MOD MDM: CPT | Mod: ,,, | Performed by: EMERGENCY MEDICINE

## 2019-01-06 PROCEDURE — 94640 AIRWAY INHALATION TREATMENT: CPT

## 2019-01-06 PROCEDURE — 25000242 PHARM REV CODE 250 ALT 637 W/ HCPCS: Performed by: EMERGENCY MEDICINE

## 2019-01-06 PROCEDURE — 93010 ELECTROCARDIOGRAM REPORT: CPT | Mod: ,,, | Performed by: INTERNAL MEDICINE

## 2019-01-06 RX ORDER — OSELTAMIVIR PHOSPHATE 75 MG/1
75 CAPSULE ORAL 2 TIMES DAILY
COMMUNITY
End: 2019-07-22

## 2019-01-06 RX ORDER — KETOROLAC TROMETHAMINE 30 MG/ML
15 INJECTION, SOLUTION INTRAMUSCULAR; INTRAVENOUS
Status: COMPLETED | OUTPATIENT
Start: 2019-01-06 | End: 2019-01-06

## 2019-01-06 RX ORDER — METHYLPREDNISOLONE SOD SUCC 125 MG
125 VIAL (EA) INJECTION
Status: COMPLETED | OUTPATIENT
Start: 2019-01-06 | End: 2019-01-06

## 2019-01-06 RX ORDER — IPRATROPIUM BROMIDE AND ALBUTEROL SULFATE 2.5; .5 MG/3ML; MG/3ML
3 SOLUTION RESPIRATORY (INHALATION)
Status: COMPLETED | OUTPATIENT
Start: 2019-01-06 | End: 2019-01-06

## 2019-01-06 RX ORDER — PROMETHAZINE HYDROCHLORIDE AND DEXTROMETHORPHAN HYDROBROMIDE 6.25; 15 MG/5ML; MG/5ML
5 SYRUP ORAL 4 TIMES DAILY PRN
Qty: 1 BOTTLE | Refills: 0 | Status: SHIPPED | OUTPATIENT
Start: 2019-01-06 | End: 2019-01-16

## 2019-01-06 RX ORDER — ALBUTEROL SULFATE 90 UG/1
1-2 AEROSOL, METERED RESPIRATORY (INHALATION) EVERY 6 HOURS PRN
Qty: 1 INHALER | Refills: 0 | Status: SHIPPED | OUTPATIENT
Start: 2019-01-06 | End: 2021-09-21

## 2019-01-06 RX ORDER — METHYLPREDNISOLONE 4 MG/1
TABLET ORAL
Qty: 1 PACKAGE | Refills: 0 | Status: SHIPPED | OUTPATIENT
Start: 2019-01-06 | End: 2019-01-27

## 2019-01-06 RX ADMIN — METHYLPREDNISOLONE SODIUM SUCCINATE 125 MG: 125 INJECTION, POWDER, FOR SOLUTION INTRAMUSCULAR; INTRAVENOUS at 02:01

## 2019-01-06 RX ADMIN — KETOROLAC TROMETHAMINE 15 MG: 30 INJECTION, SOLUTION INTRAMUSCULAR at 03:01

## 2019-01-06 RX ADMIN — IPRATROPIUM BROMIDE AND ALBUTEROL SULFATE 3 ML: .5; 3 SOLUTION RESPIRATORY (INHALATION) at 02:01

## 2019-01-06 RX ADMIN — SODIUM CHLORIDE 1000 ML: 0.9 INJECTION, SOLUTION INTRAVENOUS at 02:01

## 2019-01-06 NOTE — ED TRIAGE NOTES
Pt dx with Flu yesterday at [facility], given tamiflu at home. Pt reports not feeling better with worse chest tightness and sob.      LOC: The patient is awake, alert, aware of environment with an appropriate affect. Oriented x4, speaking appropriately  APPEARANCE: sob when speaking. pt is clean and well groomed, clothing properly fastened  SKIN:The skin is warm and dry, color consistent with ethnicity, patient has normal skin turgor and moist mucus membranes  RESPIRATORY:Airway is open and patent, respirations are spontaneous, sob when talking  CARDIAC: fastand rhythm, no peripheral edema noted, capillary refill < 3 seconds, bilateral radial pulses 2+.  NEUROLOGIC: PERRLA, facial expression is symmetrical, patient moving all extremities spontaneously, normal sensation in all extremities when touched with a finger.  Follows all commands appropriately  MUSCULOSKELETAL: Patient moving all extremities spontaneously, no obvious swelling or deformities noted.

## 2019-01-06 NOTE — ED PROVIDER NOTES
Encounter Date: 2019    SCRIBE #1 NOTE: I, Seth Acosta, am scribing for, and in the presence of,  Dr. Aranda. I have scribed the entire note.     I, Dr. Larry Aranda, personally performed the services described in this documentation. All medical record entries made by the scribe were at my direction and in my presence.  I have reviewed the chart and agree that the record reflects my personal performance and is accurate and complete.  Larry Aranda MD.  4:45 AM 2019      History     Chief Complaint   Patient presents with    Influenza     Pt reports being diagnosed with flu this afternoon, first dose of Tamilfu taken. Pt states she is still having chest heaviness and shortness of breath.    Chest Pain     Time patient was seen by the provider: 2:36 AM      The patient is a 28 y.o. female with co-morbidities including: hypercholesteremia and hernation of lumbar intervertebral disc, who presents to the ED with a complaint of influenza with worsening symptoms. Patient reports cough, generalized body aches, SOB with deep breaths, and burning chest discomfort for 2 days that worsened today. She reports being diagnosed with influenza at [facility] today.       The history is provided by the patient.     Review of patient's allergies indicates:  No Known Allergies  Past Medical History:   Diagnosis Date    Herniation of lumbar intervertebral disc     Hypercholesteremia      History reviewed. No pertinent surgical history.  Family History   Problem Relation Age of Onset    Thyroid disease Mother     Hypertension Sister     Heart disease Maternal Grandfather     Hypertension Paternal Grandmother     Hyperlipidemia Sister      Social History     Tobacco Use    Smoking status: Former Smoker     Types: Cigarettes     Start date: 2009     Last attempt to quit: 7/3/2016     Years since quittin.5    Smokeless tobacco: Never Used   Substance Use Topics    Alcohol use: Yes     Alcohol/week: 0.6 oz     Types: 1  Glasses of wine per week    Drug use: No     Review of Systems   Constitutional:        Generalized body aches.    Respiratory: Positive for cough and shortness of breath.    Cardiovascular: Positive for chest pain (burning).   All other systems reviewed and are negative.      Physical Exam     Initial Vitals [01/06/19 0203]   BP Pulse Resp Temp SpO2   125/81 (!) 120 20 99.6 °F (37.6 °C) 95 %      MAP       --         Physical Exam    Nursing note and vitals reviewed.  Constitutional: She appears well-developed and well-nourished.   HENT:   Head: Normocephalic and atraumatic.   Eyes: Conjunctivae and EOM are normal. Pupils are equal, round, and reactive to light.   Neck: Normal range of motion. Neck supple. No tracheal deviation present.   Cardiovascular: Regular rhythm, normal heart sounds and intact distal pulses.   Mild tachycardia.    Pulmonary/Chest: Breath sounds normal. No respiratory distress. She has no wheezes. She has no rhonchi. She has no rales. She exhibits no tenderness.   Abdominal: Soft. Bowel sounds are normal. She exhibits no distension and no mass. There is no tenderness. There is no rebound and no guarding.   Musculoskeletal: Normal range of motion. She exhibits no edema or tenderness.   Neurological: She is alert and oriented to person, place, and time. She has normal strength and normal reflexes. She displays normal reflexes. No cranial nerve deficit or sensory deficit.   Skin: Skin is warm and dry. Capillary refill takes less than 2 seconds.   Psychiatric: She has a normal mood and affect. Her behavior is normal. Judgment and thought content normal.         ED Course   Procedures  Labs Reviewed   CBC W/ AUTO DIFFERENTIAL - Abnormal; Notable for the following components:       Result Value    Immature Granulocytes 0.6 (*)     Lymph # 0.4 (*)     Gran% 81.5 (*)     Lymph% 6.2 (*)     All other components within normal limits   COMPREHENSIVE METABOLIC PANEL - Abnormal; Notable for the following  components:    Alkaline Phosphatase 44 (*)     All other components within normal limits   URINALYSIS, REFLEX TO URINE CULTURE - Abnormal; Notable for the following components:    Appearance, UA Hazy (*)     Protein, UA 1+ (*)     Occult Blood UA 1+ (*)     All other components within normal limits    Narrative:     ORANGE CUP SENT   LACTIC ACID, PLASMA   URINALYSIS MICROSCOPIC    Narrative:     ORANGE CUP SENT   POCT URINE PREGNANCY          Imaging Results          X-Ray Chest AP Portable (Final result)  Result time 01/06/19 03:19:54    Final result by Lenny Joseph MD (01/06/19 03:19:54)                 Impression:      No acute findings in the chest.      Electronically signed by: Lenny Joseph MD  Date:    01/06/2019  Time:    03:19             Narrative:    EXAMINATION:  XR CHEST AP PORTABLE    CLINICAL HISTORY:  Fever, unspecified    TECHNIQUE:  Single frontal view of the chest was performed.    COMPARISON:  May 29, 2018.    FINDINGS:  No consolidation, pleural effusion or pneumothorax.    Cardiomediastinal silhouette is unremarkable.                                 Medical Decision Making:   History:   Old Medical Records: I decided to obtain old medical records.  Clinical Tests:   Lab Tests: Ordered and Reviewed  Radiological Study: Ordered and Reviewed  Medical Tests: Ordered and Reviewed  ED Management:  Patient reports stress echo improved symptoms after anti-inflammatory medicine, and breathing treatments.  This point patient will be discharged home with medicines and take as needed for symptom improvement.  She was given instructions to follow up with the primary care physician/Clinic for further assessment evaluation, and to return to the ER for any acute negative symptoms.  The patient verbalized understanding of this medical plan and agreed            Scribe Attestation:   Scribe #1: I performed the above scribed service and the documentation accurately describes the services I performed. I  attest to the accuracy of the note.               Clinical Impression:   The primary encounter diagnosis was Influenza. Diagnoses of Chest pain, Fever, and Cough were also pertinent to this visit.      Disposition:   Disposition: Discharged  Condition: Stable                        Isaias Aranda MD  01/07/19 0606

## 2019-07-14 ENCOUNTER — NURSE TRIAGE (OUTPATIENT)
Dept: ADMINISTRATIVE | Facility: CLINIC | Age: 29
End: 2019-07-14

## 2019-07-14 NOTE — TELEPHONE ENCOUNTER
99.8 fri and Saturday-oral  Sunday AM it is 101.8 oral    Reason for Disposition   Fever present > 3 days (72 hours)    Protocols used: FEVER-A-AH

## 2019-07-15 ENCOUNTER — NURSE TRIAGE (OUTPATIENT)
Dept: ADMINISTRATIVE | Facility: CLINIC | Age: 29
End: 2019-07-15

## 2019-07-15 ENCOUNTER — OFFICE VISIT (OUTPATIENT)
Dept: FAMILY MEDICINE | Facility: CLINIC | Age: 29
End: 2019-07-15
Payer: COMMERCIAL

## 2019-07-15 VITALS
SYSTOLIC BLOOD PRESSURE: 104 MMHG | WEIGHT: 184.88 LBS | BODY MASS INDEX: 30.8 KG/M2 | OXYGEN SATURATION: 98 % | HEIGHT: 65 IN | DIASTOLIC BLOOD PRESSURE: 68 MMHG | HEART RATE: 105 BPM | TEMPERATURE: 98 F

## 2019-07-15 DIAGNOSIS — B34.9 NONSPECIFIC SYNDROME SUGGESTIVE OF VIRAL ILLNESS: Primary | ICD-10-CM

## 2019-07-15 DIAGNOSIS — B09 VIRAL EXANTHEM: ICD-10-CM

## 2019-07-15 PROCEDURE — 99999 PR PBB SHADOW E&M-EST. PATIENT-LVL III: ICD-10-PCS | Mod: PBBFAC,,, | Performed by: NURSE PRACTITIONER

## 2019-07-15 PROCEDURE — 99213 PR OFFICE/OUTPT VISIT, EST, LEVL III, 20-29 MIN: ICD-10-PCS | Mod: S$GLB,,, | Performed by: NURSE PRACTITIONER

## 2019-07-15 PROCEDURE — 99999 PR PBB SHADOW E&M-EST. PATIENT-LVL III: CPT | Mod: PBBFAC,,, | Performed by: NURSE PRACTITIONER

## 2019-07-15 PROCEDURE — 3008F PR BODY MASS INDEX (BMI) DOCUMENTED: ICD-10-PCS | Mod: CPTII,S$GLB,, | Performed by: NURSE PRACTITIONER

## 2019-07-15 PROCEDURE — 3008F BODY MASS INDEX DOCD: CPT | Mod: CPTII,S$GLB,, | Performed by: NURSE PRACTITIONER

## 2019-07-15 PROCEDURE — 99213 OFFICE O/P EST LOW 20 MIN: CPT | Mod: S$GLB,,, | Performed by: NURSE PRACTITIONER

## 2019-07-15 RX ORDER — HYDROCORTISONE 25 MG/G
CREAM TOPICAL 2 TIMES DAILY
Qty: 28 G | Refills: 1 | Status: SHIPPED | OUTPATIENT
Start: 2019-07-15

## 2019-07-15 NOTE — TELEPHONE ENCOUNTER
Called and left voice msg telling pt 's recommendation to call back to schedule an appt NP for current conditions.

## 2019-07-15 NOTE — PROGRESS NOTES
Subjective:       Patient ID: Jessica Garcia is a 28 y.o. female.    Chief Complaint: Torticollis (Started Friday with a fever 99.8 to 101.8 until Sat) and Rash    HPI     Patient presents today with complaints of low grade fever that started on Friday. She also had a headache and some lightheadedness. She then began with a stiff neck that is only present in the mornings and improves within an hour or so. She recently purchased new sleep pillows. Last night she noticed an itchy rash that presented on her legs and back.  No fever today.  She has been taking Theraflu as suggested by triage nurse. She overall is feeling better. Her rash and headache are the most bothersome symptoms at this time.     Review of Systems   Constitutional: Positive for chills, fatigue and fever.   HENT: Negative for congestion, ear pain, postnasal drip, sinus pressure, sinus pain and sore throat.    Respiratory: Negative for cough and shortness of breath.    Cardiovascular: Negative for chest pain and palpitations.   Gastrointestinal: Negative.    Musculoskeletal: Positive for neck pain and neck stiffness.   Neurological: Positive for headaches. Negative for dizziness and light-headedness.       Objective:      Vitals:    07/15/19 1436   BP: 104/68   Pulse: 105   Temp: 98.4 °F (36.9 °C)     Physical Exam   Constitutional: She appears well-developed and well-nourished.   HENT:   Head: Normocephalic and atraumatic.   Right Ear: Tympanic membrane normal.   Left Ear: Tympanic membrane normal.   Nose: Nose normal. Right sinus exhibits no maxillary sinus tenderness and no frontal sinus tenderness. Left sinus exhibits no maxillary sinus tenderness and no frontal sinus tenderness.   Mouth/Throat: Posterior oropharyngeal edema (mild) and posterior oropharyngeal erythema (mild) present. No oropharyngeal exudate.   Neck: Normal range of motion. Muscular tenderness (pt describes it as a sore feeling) present. No spinous process tenderness present.  No neck rigidity. Normal range of motion present. No Brudzinski's sign noted.   Cardiovascular: Normal rate, regular rhythm and normal heart sounds. Exam reveals no friction rub.   No murmur heard.  Pulmonary/Chest: Effort normal and breath sounds normal. No respiratory distress. She has no wheezes. She has no rales.   Musculoskeletal:        Cervical back: She exhibits normal range of motion, no tenderness and no bony tenderness.   Lymphadenopathy:     She has no cervical adenopathy.   Skin: Rash noted. Rash is maculopapular.            Assessment:       1. Nonspecific syndrome suggestive of viral illness    2. Viral exanthem        Plan:     Advil or Aleve for neck pain  Warm moist heat     Zyrtec once daily for itching  Hydrocortisone cream 2 times daily for itching  Rash should improve on its own within a few days.    Call or RTC if symptoms worsen or do not begin to improve

## 2019-07-15 NOTE — TELEPHONE ENCOUNTER
Was told to call back if symptoms changes. Had a fever 101.8 past morning and stiff neck. Was able to touch chin to chest. Now has a rash and lightheadedness.    Reason for Disposition   [1] MODERATE dizziness (e.g., interferes with normal activities) AND [2] has NOT been evaluated by physician for this  (Exception: dizziness caused by heat exposure, sudden standing, or poor fluid intake)    Protocols used: DIZZINESS - [id number]

## 2019-07-15 NOTE — TELEPHONE ENCOUNTER
Spoke with the patient to inform her Dr. Cobian recommends she seek medical attention at the ED due to neck stiffness and rash.     Patient states the neck stiffness is only in the morning when she awakens and the rash is no longer visible. Patient states she's not sure if the rash was just an allergic reaction.     Patient would like to know if Dr. Cobian still thinks she needs to go to the ED although she no longer has the symptoms?

## 2019-07-15 NOTE — PATIENT INSTRUCTIONS
Jessica,     We are always striving for excellence. Should you receive a patient experience survey in the mail, we would appreciate if you would take a few moments to give us your feedback. These surveys let us know our strengths as well as areas of opportunity for improvement to better serve you.    Thank you for your time,  Louis Barraza MA

## 2019-07-16 ENCOUNTER — PATIENT MESSAGE (OUTPATIENT)
Dept: FAMILY MEDICINE | Facility: CLINIC | Age: 29
End: 2019-07-16

## 2019-07-19 ENCOUNTER — PATIENT MESSAGE (OUTPATIENT)
Dept: FAMILY MEDICINE | Facility: CLINIC | Age: 29
End: 2019-07-19

## 2019-07-21 ENCOUNTER — PATIENT MESSAGE (OUTPATIENT)
Dept: FAMILY MEDICINE | Facility: CLINIC | Age: 29
End: 2019-07-21

## 2019-07-22 ENCOUNTER — OFFICE VISIT (OUTPATIENT)
Dept: FAMILY MEDICINE | Facility: CLINIC | Age: 29
End: 2019-07-22
Attending: FAMILY MEDICINE
Payer: COMMERCIAL

## 2019-07-22 ENCOUNTER — PATIENT MESSAGE (OUTPATIENT)
Dept: FAMILY MEDICINE | Facility: CLINIC | Age: 29
End: 2019-07-22

## 2019-07-22 VITALS
OXYGEN SATURATION: 96 % | WEIGHT: 186.19 LBS | HEIGHT: 65 IN | SYSTOLIC BLOOD PRESSURE: 98 MMHG | BODY MASS INDEX: 31.02 KG/M2 | DIASTOLIC BLOOD PRESSURE: 70 MMHG | HEART RATE: 104 BPM | TEMPERATURE: 100 F

## 2019-07-22 DIAGNOSIS — J02.9 ACUTE PHARYNGITIS, UNSPECIFIED ETIOLOGY: Primary | ICD-10-CM

## 2019-07-22 DIAGNOSIS — H69.93 ETD (EUSTACHIAN TUBE DYSFUNCTION), BILATERAL: ICD-10-CM

## 2019-07-22 PROCEDURE — 99214 PR OFFICE/OUTPT VISIT, EST, LEVL IV, 30-39 MIN: ICD-10-PCS | Mod: S$GLB,,, | Performed by: FAMILY MEDICINE

## 2019-07-22 PROCEDURE — 87070 CULTURE OTHR SPECIMN AEROBIC: CPT

## 2019-07-22 PROCEDURE — 3008F PR BODY MASS INDEX (BMI) DOCUMENTED: ICD-10-PCS | Mod: CPTII,S$GLB,, | Performed by: FAMILY MEDICINE

## 2019-07-22 PROCEDURE — 99214 OFFICE O/P EST MOD 30 MIN: CPT | Mod: S$GLB,,, | Performed by: FAMILY MEDICINE

## 2019-07-22 PROCEDURE — 3008F BODY MASS INDEX DOCD: CPT | Mod: CPTII,S$GLB,, | Performed by: FAMILY MEDICINE

## 2019-07-22 PROCEDURE — 99999 PR PBB SHADOW E&M-EST. PATIENT-LVL III: CPT | Mod: PBBFAC,,, | Performed by: FAMILY MEDICINE

## 2019-07-22 PROCEDURE — 99999 PR PBB SHADOW E&M-EST. PATIENT-LVL III: ICD-10-PCS | Mod: PBBFAC,,, | Performed by: FAMILY MEDICINE

## 2019-07-22 RX ORDER — AMOXICILLIN AND CLAVULANATE POTASSIUM 875; 125 MG/1; MG/1
1 TABLET, FILM COATED ORAL 2 TIMES DAILY
Qty: 20 TABLET | Refills: 0 | Status: SHIPPED | OUTPATIENT
Start: 2019-07-22 | End: 2019-08-01

## 2019-07-22 NOTE — PROGRESS NOTES
"Subjective:       Patient ID: Jessica Garcia is a 28 y.o. female.    Chief Complaint: URI    HPI    The patient returns for followup.  She was seen one week ago for a rash and fever, and was diagnosed with a viral exanthem.  She still notes fatigue, lightheadedness, and a fever to 100 degrees F.  Her temperature is reduced with ibuprofen, but quickly returns in 2-3 hours.      Patient Active Problem List   Diagnosis    Thyroid nodule    Vitamin D deficiency    Disc displacement, lumbar    [place] cardiac risk <10% in next 10 years       Current Outpatient Medications:     albuterol (PROVENTIL/VENTOLIN HFA) 90 mcg/actuation inhaler, Inhale 1-2 puffs into the lungs every 6 (six) hours as needed for Wheezing. Rescue, Disp: 1 Inhaler, Rfl: 0    hydrocortisone 2.5 % cream, Apply topically 2 (two) times daily., Disp: 28 g, Rfl: 1    ibuprofen (ADVIL,MOTRIN) 800 MG tablet, Take 800 mg by mouth as needed for Pain., Disp: , Rfl:     norethindrone-e.estradiol-iron (LO LOESTRIN FE) 1 mg-10 mcg (24)/10 mcg (2) Tab, Take by mouth., Disp: , Rfl:     The following portions of the patient's history were reviewed and updated as appropriate: allergies, past family history, past medical history, past social history and past surgical history.    Review of Systems      Objective:      BP 98/70 (BP Location: Left arm, Patient Position: Sitting, BP Method: Small (Manual))   Pulse 104   Temp (!) 100.4 °F (38 °C) (Oral)   Ht 5' 5" (1.651 m)   Wt 84.5 kg (186 lb 3.2 oz)   SpO2 96%   BMI 30.99 kg/m²     Physical Exam   Constitutional: She is oriented to person, place, and time. She appears well-developed and well-nourished. She is cooperative.   HENT:   Head: Normocephalic and atraumatic.   Right Ear: External ear and ear canal normal. Tympanic membrane is erythematous and bulging. Tympanic membrane mobility is abnormal.   Left Ear: External ear and ear canal normal. Tympanic membrane is erythematous and bulging. Tympanic " "membrane mobility is abnormal.   Nose: Mucosal edema and rhinorrhea present.   Mouth/Throat: Mucous membranes are normal. Oropharyngeal exudate (scant, no crypt abscesses) and posterior oropharyngeal erythema (moderate, with strawberry tongue) present.   Eyes: Conjunctivae are normal. No scleral icterus.   Neck: Neck supple. No JVD present. Carotid bruit is not present. No thyromegaly present.   Cardiovascular: Normal rate, regular rhythm, normal heart sounds and normal pulses. Exam reveals no gallop and no friction rub.   No murmur heard.  Pulmonary/Chest: Effort normal and breath sounds normal. She has no wheezes. She has no rhonchi. She has no rales.   Abdominal: Soft. Bowel sounds are normal. She exhibits no distension and no mass. There is no splenomegaly or hepatomegaly. There is no tenderness.   Musculoskeletal: Normal range of motion. She exhibits no edema or tenderness.   Lymphadenopathy:     She has cervical adenopathy.     She has no axillary adenopathy.   Neurological: She is alert and oriented to person, place, and time. She has normal strength and normal reflexes. No cranial nerve deficit or sensory deficit.   Skin: Skin is warm and dry.   Psychiatric: She has a normal mood and affect. Her speech is normal.   Vitals reviewed.      Assessment:       1. Acute pharyngitis, unspecified etiology    2. ETD (Eustachian tube dysfunction), bilateral        Plan:       Possible bacterial pharyngitis.  Trial of Augmentin.  Sore throat comfort measures.  NSAIDs prn.  Rest.  Patient informed she may still be contagious for next 24 hrs.  Decongestants prn.    Follow-up by phone/email in a few days.        "This note will not be shared with the patient."  "

## 2019-07-22 NOTE — PATIENT INSTRUCTIONS
Jessica,     We are always striving for excellence. Should you receive a patient experience survey electronically or by mail, we would appreciate if you would take a few moments to give us your feedback. These surveys let us know our strengths as well as areas of opportunity for improvement to better serve you.    Thank you for your time,  Valentina Tanner LPN    Test results will be communicated to you via : [facility], Telephone or Letter.   If you have not received test results in one week, please contact the clinic at   [phone].            For sore throat:  Gargle with warm salt water several times a day to reduce throat swelling and pain. Mix 1 teaspoon of salt in 8 ounces of water.   Drink extra fluids to soothe your throat. Honey or lemon in weak tea may help.   Get plenty of rest.   Takeover-the-counter medicine such as aspirin or ibuprofen to relieve pain and reduce fever.   Try throat lozenges that have a painkiller to numb your throat, such as Sucrets Maximum Strength. Regular cough drops may also help.   Try a decongestant. It can make breathing easier and relieve a runny nose and postnasal drip, which can make your throat hurt.

## 2019-07-23 ENCOUNTER — PATIENT MESSAGE (OUTPATIENT)
Dept: FAMILY MEDICINE | Facility: CLINIC | Age: 29
End: 2019-07-23

## 2019-07-23 ENCOUNTER — TELEPHONE (OUTPATIENT)
Dept: FAMILY MEDICINE | Facility: CLINIC | Age: 29
End: 2019-07-23

## 2019-07-23 NOTE — TELEPHONE ENCOUNTER
----- Message from Elizabeth Oviedo sent at 7/23/2019 11:55 AM CDT -----  Contact: Pt   Name of Who is Calling: MELINDA PEMBERTON [[id number]]    What is the request in detail: Pt is requesting is a call back regarding her test results....Please contact to further discuss and advise      Can the clinic reply by [facility]: Yes     [facility]  [phone]

## 2019-07-24 ENCOUNTER — PATIENT MESSAGE (OUTPATIENT)
Dept: FAMILY MEDICINE | Facility: CLINIC | Age: 29
End: 2019-07-24

## 2019-07-24 ENCOUNTER — TELEPHONE (OUTPATIENT)
Dept: FAMILY MEDICINE | Facility: CLINIC | Age: 29
End: 2019-07-24

## 2019-07-24 ENCOUNTER — LAB VISIT (OUTPATIENT)
Dept: LAB | Facility: HOSPITAL | Age: 29
End: 2019-07-24
Attending: FAMILY MEDICINE
Payer: COMMERCIAL

## 2019-07-24 DIAGNOSIS — R50.9 FEVER, UNSPECIFIED FEVER CAUSE: Primary | ICD-10-CM

## 2019-07-24 DIAGNOSIS — R50.9 FEVER, UNSPECIFIED FEVER CAUSE: ICD-10-CM

## 2019-07-24 LAB
ALBUMIN SERPL BCP-MCNC: 2.9 G/DL (ref 3.5–5.2)
ALP SERPL-CCNC: 96 U/L (ref 55–135)
ALT SERPL W/O P-5'-P-CCNC: 167 U/L (ref 10–44)
ANION GAP SERPL CALC-SCNC: 9 MMOL/L (ref 8–16)
ANISOCYTOSIS BLD QL SMEAR: SLIGHT
AST SERPL-CCNC: 120 U/L (ref 10–40)
BACTERIA THROAT CULT: NORMAL
BASOPHILS # BLD AUTO: ABNORMAL K/UL (ref 0–0.2)
BASOPHILS NFR BLD: 1 % (ref 0–1.9)
BILIRUB SERPL-MCNC: 0.7 MG/DL (ref 0.1–1)
BUN SERPL-MCNC: 10 MG/DL (ref 6–20)
BURR CELLS BLD QL SMEAR: ABNORMAL
CALCIUM SERPL-MCNC: 9.1 MG/DL (ref 8.7–10.5)
CHLORIDE SERPL-SCNC: 101 MMOL/L (ref 95–110)
CO2 SERPL-SCNC: 28 MMOL/L (ref 23–29)
CREAT SERPL-MCNC: 0.7 MG/DL (ref 0.5–1.4)
DIFFERENTIAL METHOD: ABNORMAL
EOSINOPHIL # BLD AUTO: ABNORMAL K/UL (ref 0–0.5)
EOSINOPHIL NFR BLD: 3 % (ref 0–8)
ERYTHROCYTE [DISTWIDTH] IN BLOOD BY AUTOMATED COUNT: 13.7 % (ref 11.5–14.5)
EST. GFR  (AFRICAN AMERICAN): >60 ML/MIN/1.73 M^2
EST. GFR  (NON AFRICAN AMERICAN): >60 ML/MIN/1.73 M^2
GLUCOSE SERPL-MCNC: 80 MG/DL (ref 70–110)
HCT VFR BLD AUTO: 41.8 % (ref 37–48.5)
HGB BLD-MCNC: 13.5 G/DL (ref 12–16)
HYPOCHROMIA BLD QL SMEAR: ABNORMAL
IMM GRANULOCYTES # BLD AUTO: ABNORMAL K/UL (ref 0–0.04)
IMM GRANULOCYTES NFR BLD AUTO: ABNORMAL % (ref 0–0.5)
LYMPHOCYTES # BLD AUTO: ABNORMAL K/UL (ref 1–4.8)
LYMPHOCYTES NFR BLD: 59 % (ref 18–48)
MCH RBC QN AUTO: 29.7 PG (ref 27–31)
MCHC RBC AUTO-ENTMCNC: 32.3 G/DL (ref 32–36)
MCV RBC AUTO: 92 FL (ref 82–98)
MONOCYTES # BLD AUTO: ABNORMAL K/UL (ref 0.3–1)
MONOCYTES NFR BLD: 1 % (ref 4–15)
MYELOCYTES NFR BLD MANUAL: 1 %
NEUTROPHILS NFR BLD: 33 % (ref 38–73)
NRBC BLD-RTO: 0 /100 WBC
OVALOCYTES BLD QL SMEAR: ABNORMAL
PLATELET # BLD AUTO: 253 K/UL (ref 150–350)
PMV BLD AUTO: 9.9 FL (ref 9.2–12.9)
POIKILOCYTOSIS BLD QL SMEAR: SLIGHT
POLYCHROMASIA BLD QL SMEAR: ABNORMAL
POTASSIUM SERPL-SCNC: 4.3 MMOL/L (ref 3.5–5.1)
PROT SERPL-MCNC: 7.6 G/DL (ref 6–8.4)
RBC # BLD AUTO: 4.55 M/UL (ref 4–5.4)
SODIUM SERPL-SCNC: 138 MMOL/L (ref 136–145)
WBC # BLD AUTO: 11.67 K/UL (ref 3.9–12.7)
WBC OTHER NFR BLD MANUAL: 2 %

## 2019-07-24 PROCEDURE — 85060 PATHOLOGIST REVIEW: ICD-10-PCS | Mod: ,,, | Performed by: PATHOLOGY

## 2019-07-24 PROCEDURE — 85060 BLOOD SMEAR INTERPRETATION: CPT | Mod: ,,, | Performed by: PATHOLOGY

## 2019-07-24 PROCEDURE — 85025 COMPLETE CBC W/AUTO DIFF WBC: CPT

## 2019-07-24 PROCEDURE — 86309 HETEROPHILE ANTIBODY TITER: CPT

## 2019-07-24 PROCEDURE — 36415 COLL VENOUS BLD VENIPUNCTURE: CPT | Mod: PO

## 2019-07-24 PROCEDURE — 80053 COMPREHEN METABOLIC PANEL: CPT

## 2019-07-24 NOTE — TELEPHONE ENCOUNTER
----- Message from Emerita Valladares sent at 7/24/2019  8:25 AM CDT -----  Contact: MELINDA PEMBERTON   Name of Who is Calling: MELINDA PEMBERTON       What is the request in detail: Patient is requesting a call back concerning orders for blood work she states she is still  Having symptoms and its not getting better      Can the clinic reply by [facility]: no      What Number to Call Back if not in [facility]: [phone]

## 2019-07-24 NOTE — TELEPHONE ENCOUNTER
Patient came to the office this morning in regards to her fever. Patient was informed in the future to call the office if need immediate response versus sending a message via the portal per Dr. Cobian.     Dr. Cobian has entered orders for the patient to have labs drawn.

## 2019-07-25 LAB — PATH REV BLD -IMP: NORMAL

## 2019-07-27 LAB
HETEROPH AB SER QL LA: POSITIVE
HETEROPH AB TITR SER LA: ABNORMAL {TITER}

## 2019-07-28 ENCOUNTER — PATIENT MESSAGE (OUTPATIENT)
Dept: FAMILY MEDICINE | Facility: CLINIC | Age: 29
End: 2019-07-28

## 2019-08-12 ENCOUNTER — NURSE TRIAGE (OUTPATIENT)
Dept: ADMINISTRATIVE | Facility: CLINIC | Age: 29
End: 2019-08-12

## 2019-08-12 ENCOUNTER — HOSPITAL ENCOUNTER (EMERGENCY)
Facility: HOSPITAL | Age: 29
Discharge: HOME | End: 2019-08-12
Attending: EMERGENCY MEDICINE
Payer: COMMERCIAL

## 2019-08-12 VITALS
HEIGHT: 65 IN | DIASTOLIC BLOOD PRESSURE: 72 MMHG | TEMPERATURE: 99 F | RESPIRATION RATE: 20 BRPM | SYSTOLIC BLOOD PRESSURE: 118 MMHG | HEART RATE: 102 BPM | BODY MASS INDEX: 29.16 KG/M2 | WEIGHT: 175 LBS | OXYGEN SATURATION: 97 %

## 2019-08-12 DIAGNOSIS — K59.00 CONSTIPATION, UNSPECIFIED CONSTIPATION TYPE: Primary | ICD-10-CM

## 2019-08-12 LAB
B-HCG UR QL: NEGATIVE
CTP QC/QA: YES

## 2019-08-12 PROCEDURE — 81025 URINE PREGNANCY TEST: CPT | Mod: ER | Performed by: EMERGENCY MEDICINE

## 2019-08-12 PROCEDURE — 99284 EMERGENCY DEPT VISIT MOD MDM: CPT | Mod: ER

## 2019-08-12 RX ORDER — METOCLOPRAMIDE 10 MG/1
10 TABLET ORAL EVERY 6 HOURS PRN
Qty: 30 TABLET | Refills: 0 | Status: SHIPPED | OUTPATIENT
Start: 2019-08-12 | End: 2021-03-16

## 2019-08-13 ENCOUNTER — PATIENT MESSAGE (OUTPATIENT)
Dept: FAMILY MEDICINE | Facility: CLINIC | Age: 29
End: 2019-08-13

## 2019-08-13 ENCOUNTER — HOSPITAL ENCOUNTER (EMERGENCY)
Facility: HOSPITAL | Age: 29
Discharge: HOME | End: 2019-08-13
Attending: EMERGENCY MEDICINE
Payer: COMMERCIAL

## 2019-08-13 ENCOUNTER — NURSE TRIAGE (OUTPATIENT)
Dept: ADMINISTRATIVE | Facility: CLINIC | Age: 29
End: 2019-08-13

## 2019-08-13 ENCOUNTER — TELEPHONE (OUTPATIENT)
Dept: FAMILY MEDICINE | Facility: CLINIC | Age: 29
End: 2019-08-13

## 2019-08-13 VITALS
BODY MASS INDEX: 29.16 KG/M2 | WEIGHT: 175 LBS | DIASTOLIC BLOOD PRESSURE: 70 MMHG | SYSTOLIC BLOOD PRESSURE: 113 MMHG | OXYGEN SATURATION: 98 % | HEART RATE: 87 BPM | HEIGHT: 65 IN | TEMPERATURE: 99 F | RESPIRATION RATE: 18 BRPM

## 2019-08-13 DIAGNOSIS — E04.1 THYROID NODULE: ICD-10-CM

## 2019-08-13 DIAGNOSIS — R10.9 LEFT FLANK PAIN: ICD-10-CM

## 2019-08-13 DIAGNOSIS — K59.09 OTHER CONSTIPATION: ICD-10-CM

## 2019-08-13 DIAGNOSIS — R07.9 LEFT SIDED CHEST PAIN: Primary | ICD-10-CM

## 2019-08-13 DIAGNOSIS — R06.02 SOB (SHORTNESS OF BREATH): ICD-10-CM

## 2019-08-13 LAB
ALBUMIN SERPL BCP-MCNC: 3.4 G/DL (ref 3.5–5.2)
ALP SERPL-CCNC: 49 U/L (ref 55–135)
ALT SERPL W/O P-5'-P-CCNC: 46 U/L (ref 10–44)
ANION GAP SERPL CALC-SCNC: 9 MMOL/L (ref 8–16)
AST SERPL-CCNC: 31 U/L (ref 10–40)
BASOPHILS # BLD AUTO: 0.1 K/UL (ref 0–0.2)
BASOPHILS NFR BLD: 1 % (ref 0–1.9)
BILIRUB SERPL-MCNC: 0.8 MG/DL (ref 0.1–1)
BILIRUB UR QL STRIP: NEGATIVE
BUN SERPL-MCNC: 14 MG/DL (ref 6–20)
CALCIUM SERPL-MCNC: 9.5 MG/DL (ref 8.7–10.5)
CHLORIDE SERPL-SCNC: 104 MMOL/L (ref 95–110)
CLARITY UR REFRACT.AUTO: CLEAR
CO2 SERPL-SCNC: 24 MMOL/L (ref 23–29)
COLOR UR AUTO: YELLOW
CREAT SERPL-MCNC: 0.7 MG/DL (ref 0.5–1.4)
D DIMER PPP IA.FEU-MCNC: 1.81 MG/L FEU
DIFFERENTIAL METHOD: ABNORMAL
EOSINOPHIL # BLD AUTO: 0.2 K/UL (ref 0–0.5)
EOSINOPHIL NFR BLD: 1.7 % (ref 0–8)
ERYTHROCYTE [DISTWIDTH] IN BLOOD BY AUTOMATED COUNT: 14.3 % (ref 11.5–14.5)
EST. GFR  (AFRICAN AMERICAN): >60 ML/MIN/1.73 M^2
EST. GFR  (NON AFRICAN AMERICAN): >60 ML/MIN/1.73 M^2
GLUCOSE SERPL-MCNC: 88 MG/DL (ref 70–110)
GLUCOSE UR QL STRIP: NEGATIVE
HCT VFR BLD AUTO: 40.5 % (ref 37–48.5)
HGB BLD-MCNC: 13 G/DL (ref 12–16)
HGB UR QL STRIP: ABNORMAL
IMM GRANULOCYTES # BLD AUTO: 0.04 K/UL (ref 0–0.04)
IMM GRANULOCYTES NFR BLD AUTO: 0.4 % (ref 0–0.5)
KETONES UR QL STRIP: NEGATIVE
LEUKOCYTE ESTERASE UR QL STRIP: NEGATIVE
LIPASE SERPL-CCNC: 18 U/L (ref 4–60)
LYMPHOCYTES # BLD AUTO: 4.3 K/UL (ref 1–4.8)
LYMPHOCYTES NFR BLD: 42.4 % (ref 18–48)
MCH RBC QN AUTO: 30.1 PG (ref 27–31)
MCHC RBC AUTO-ENTMCNC: 32.1 G/DL (ref 32–36)
MCV RBC AUTO: 94 FL (ref 82–98)
MICROSCOPIC COMMENT: NORMAL
MONOCYTES # BLD AUTO: 1.1 K/UL (ref 0.3–1)
MONOCYTES NFR BLD: 10.5 % (ref 4–15)
NEUTROPHILS # BLD AUTO: 4.5 K/UL (ref 1.8–7.7)
NEUTROPHILS NFR BLD: 44 % (ref 38–73)
NITRITE UR QL STRIP: NEGATIVE
NRBC BLD-RTO: 0 /100 WBC
PH UR STRIP: 5 [PH] (ref 5–8)
PLATELET # BLD AUTO: 373 K/UL (ref 150–350)
PMV BLD AUTO: 9 FL (ref 9.2–12.9)
POTASSIUM SERPL-SCNC: 4.2 MMOL/L (ref 3.5–5.1)
PROT SERPL-MCNC: 8.4 G/DL (ref 6–8.4)
PROT UR QL STRIP: NEGATIVE
RBC # BLD AUTO: 4.32 M/UL (ref 4–5.4)
RBC #/AREA URNS AUTO: 2 /HPF (ref 0–4)
SODIUM SERPL-SCNC: 137 MMOL/L (ref 136–145)
SP GR UR STRIP: 1.02 (ref 1–1.03)
SQUAMOUS #/AREA URNS AUTO: 2 /HPF
TROPONIN I SERPL DL<=0.01 NG/ML-MCNC: <0.006 NG/ML (ref 0–0.03)
URN SPEC COLLECT METH UR: ABNORMAL
WBC # BLD AUTO: 10.19 K/UL (ref 3.9–12.7)
WBC #/AREA URNS AUTO: 1 /HPF (ref 0–5)

## 2019-08-13 PROCEDURE — 85025 COMPLETE CBC W/AUTO DIFF WBC: CPT

## 2019-08-13 PROCEDURE — 99285 EMERGENCY DEPT VISIT HI MDM: CPT

## 2019-08-13 PROCEDURE — 83690 ASSAY OF LIPASE: CPT

## 2019-08-13 PROCEDURE — 93005 ELECTROCARDIOGRAM TRACING: CPT

## 2019-08-13 PROCEDURE — 93010 ELECTROCARDIOGRAM REPORT: CPT | Mod: ,,, | Performed by: INTERNAL MEDICINE

## 2019-08-13 PROCEDURE — 85379 FIBRIN DEGRADATION QUANT: CPT

## 2019-08-13 PROCEDURE — 80053 COMPREHEN METABOLIC PANEL: CPT

## 2019-08-13 PROCEDURE — 25000003 PHARM REV CODE 250: Performed by: EMERGENCY MEDICINE

## 2019-08-13 PROCEDURE — 93010 EKG 12-LEAD: ICD-10-PCS | Mod: ,,, | Performed by: INTERNAL MEDICINE

## 2019-08-13 PROCEDURE — 25500020 PHARM REV CODE 255: Performed by: EMERGENCY MEDICINE

## 2019-08-13 PROCEDURE — 99285 EMERGENCY DEPT VISIT HI MDM: CPT | Mod: ,,, | Performed by: EMERGENCY MEDICINE

## 2019-08-13 PROCEDURE — 99285 PR EMERGENCY DEPT VISIT,LEVEL V: ICD-10-PCS | Mod: ,,, | Performed by: EMERGENCY MEDICINE

## 2019-08-13 PROCEDURE — 81001 URINALYSIS AUTO W/SCOPE: CPT

## 2019-08-13 PROCEDURE — 84484 ASSAY OF TROPONIN QUANT: CPT

## 2019-08-13 RX ORDER — ACETAMINOPHEN 500 MG
1000 TABLET ORAL
Status: COMPLETED | OUTPATIENT
Start: 2019-08-13 | End: 2019-08-13

## 2019-08-13 RX ORDER — POLYETHYLENE GLYCOL 3350 17 G/17G
17 POWDER, FOR SOLUTION ORAL DAILY
Qty: 235 G | Refills: 0 | Status: SHIPPED | OUTPATIENT
Start: 2019-08-13 | End: 2023-08-18 | Stop reason: CLARIF

## 2019-08-13 RX ADMIN — IOHEXOL 75 ML: 350 INJECTION, SOLUTION INTRAVENOUS at 03:08

## 2019-08-13 RX ADMIN — ACETAMINOPHEN 1000 MG: 500 TABLET ORAL at 01:08

## 2019-08-13 NOTE — DISCHARGE INSTRUCTIONS
"CT chest : "1.  No evidence of pulmonary thromboembolism or other acute intrathoracic abnormality.    2.  Incidentally noted 1.1 cm hypoattenuating right thyroid lobe nodule.  This was present on prior ultrasound of 12/27/2016.  Correlation for any interval biopsy advised."  Please follow up for the investigation of the thyroid nodule with your PCP  "

## 2019-08-13 NOTE — TELEPHONE ENCOUNTER
----- Message from Brittani Salas sent at 8/13/2019 10:16 AM CDT -----  Contact: Pt  Pt ask that you review her Er report and call her to discuss her abnormal results.

## 2019-08-13 NOTE — TELEPHONE ENCOUNTER
Patient sent the same message to Dr. Cobian via the patient portal. Message has been forwarded to Dr. Cobian.

## 2019-08-13 NOTE — ED PROVIDER NOTES
Encounter Date: 8/12/2019    SCRIBE #1 NOTE: I, Lily Woods, am scribing for, and in the presence of,  Dr. Castillo. I have scribed the following portions of the note - Other sections scribed: HPI, ROS, PE .       History     Chief Complaint   Patient presents with    Abdominal Pain     PT C/O LUQ PAIN SINCE 1730, REPORTS DX WITH MONO APPROX 1 MONTH AGO     28 y.o female presents to the ED with LUQ pain since 1730 tonight. She states she is recovering from mono after having it 1 month ago. She denies any changes in activity. She also denies fever, chills, or N/V/D.     The history is provided by the patient.     Review of patient's allergies indicates:   Allergen Reactions    Strawberries [strawberry] Swelling     Past Medical History:   Diagnosis Date    Herniation of lumbar intervertebral disc     Hypercholesteremia     Tachycardia      History reviewed. No pertinent surgical history.  Family History   Problem Relation Age of Onset    Thyroid disease Mother     Hypertension Sister     Heart disease Maternal Grandfather     Hypertension Paternal Grandmother     Hyperlipidemia Sister      Social History     Tobacco Use    Smoking status: Former Smoker     Types: Cigarettes     Start date: 9/20/2009     Last attempt to quit: 7/3/2016     Years since quitting: 3.1    Smokeless tobacco: Never Used   Substance Use Topics    Alcohol use: Yes     Alcohol/week: 0.6 oz     Types: 1 Glasses of wine per week    Drug use: No     Review of Systems   Constitutional: Negative.  Negative for fever.   HENT: Negative.  Negative for sore throat.    Eyes: Negative.    Respiratory: Negative.  Negative for shortness of breath.    Cardiovascular: Negative.  Negative for chest pain.   Gastrointestinal: Positive for abdominal pain. Negative for diarrhea, nausea and vomiting.   Endocrine: Negative.    Genitourinary: Negative.  Negative for dysuria.   Musculoskeletal: Negative.  Negative for myalgias.   Skin: Negative.   Negative for rash.   Allergic/Immunologic: Negative.    Neurological: Negative.  Negative for headaches.   Hematological: Negative.  Negative for adenopathy.   Psychiatric/Behavioral: Negative.  Negative for behavioral problems.   All other systems reviewed and are negative.      Physical Exam     Initial Vitals [08/12/19 2221]   BP Pulse Resp Temp SpO2   118/72 107 20 98.7 °F (37.1 °C) 97 %      MAP       --         Physical Exam    Nursing note and vitals reviewed.  Constitutional: Vital signs are normal. She appears well-developed and well-nourished. She is not diaphoretic. No distress.   HENT:   Head: Normocephalic and atraumatic.   Eyes: Conjunctivae and EOM are normal. Pupils are equal, round, and reactive to light. Right eye exhibits no discharge. Left eye exhibits no discharge. No scleral icterus.   Neck: Trachea normal and normal range of motion. Neck supple. No thyromegaly present. No stridor present. No tracheal tenderness present. No tracheal deviation present. No JVD present.   Pulmonary/Chest: No respiratory distress.   Abdominal: Soft. Bowel sounds are normal. There is no tenderness.   small enlargement that can be palpated   Musculoskeletal: Normal range of motion.   Neurological: She is alert and oriented to person, place, and time. She has normal strength. No cranial nerve deficit or sensory deficit.   Skin: Skin is warm and dry. Capillary refill takes 2 to 3 seconds. No rash noted. No erythema. No pallor.   Psychiatric: She has a normal mood and affect. Her speech is normal and behavior is normal.         ED Course   Procedures  Labs Reviewed   POCT URINE PREGNANCY          Imaging Results          US Abdomen Limited (Final result)  Result time 08/12/19 22:53:08    Final result by Jinny Lutz MD (08/12/19 22:53:08)                 Impression:      No definite evidence for splenic rupture.      Electronically signed by: Jinny Lutz  Date:    08/12/2019  Time:    22:53              Narrative:    EXAMINATION:  ULTRASOUND ABDOMEN LIMITED    CLINICAL HISTORY:  luq abd pain, dx of mono;    TECHNIQUE:  Limited ultrasound of the right upper quadrant of the abdomen, particularly the spleen, was performed.    COMPARISON:  None.    FINDINGS:  Spleen: The spleen is normal in size, measuring 11.5 x 4.7 cm.  The spleen appears intact.  There is no adjacent free fluid.    Miscellaneous: No upper abdominal ascites.                                 Medical Decision Making:   Clinical Tests:   Radiological Study: Reviewed and Ordered  ED Management:  The patient was initially concerned about her spleen and after I told her that her ultrasound of her sling was normal she told me that it must be the constipation then.  Patient will be treated for constipation as she has had difficulty having bowel movements over the course of last couple of days.  Patient has a benign abdominal exam.            Scribe Attestation:   Scribe #1: I performed the above scribed service and the documentation accurately describes the services I performed. I attest to the accuracy of the note.    This document was produced by a scribe under my direction and in my presence. I agree with the content of the note and have made any necessary edits.     Javy Castillo MD    08/12/2019 11:15 PM           Clinical Impression:     1. Constipation, unspecified constipation type                                   Javy Castillo MD  08/12/19 5942

## 2019-08-13 NOTE — ED PROVIDER NOTES
"CC: Abdominal Pain (c/o diffuse upper abd pain. Pt recently seen at [facility] and told she was constipated, "but I don't think so." Denies CP, n/v)      History provided by: Patient     HPI: Jessica Garcia is a 28 y.o. year old female who presents to the ED complaining of abd pain.  It started at 5;30 pm in the LUQ, now in her back and radiating to the neck area. Sharp, continuous, 6/10, worse w/ breathing or lying down. + sob  No cough, no fever, no n/v, no diarrhea, no dysuria, no hematuria  Never had the pain before  On birth control pills  Last BM yesterday, small, hard stool        Past Medical History:   Diagnosis Date    Herniation of lumbar intervertebral disc     Hypercholesteremia     Tachycardia      No past surgical history on file.  Family History   Problem Relation Age of Onset    Thyroid disease Mother     Hypertension Sister     Heart disease Maternal Grandfather     Hypertension Paternal Grandmother     Hyperlipidemia Sister      No current facility-administered medications on file prior to encounter.      Current Outpatient Medications on File Prior to Encounter   Medication Sig Dispense Refill    albuterol (PROVENTIL/VENTOLIN HFA) 90 mcg/actuation inhaler Inhale 1-2 puffs into the lungs every 6 (six) hours as needed for Wheezing. Rescue 1 Inhaler 0    hydrocortisone 2.5 % cream Apply topically 2 (two) times daily. 28 g 1    ibuprofen (ADVIL,MOTRIN) 800 MG tablet Take 800 mg by mouth as needed for Pain.      metoclopramide HCl (REGLAN) 10 MG tablet Take 1 tablet (10 mg total) by mouth every 6 (six) hours as needed. 30 tablet 0    norethindrone-e.estradiol-iron (LO LOESTRIN FE) 1 mg-10 mcg (24)/10 mcg (2) Tab Take by mouth.       Strawberries [strawberry]  Social History     Socioeconomic History    Marital status: Single     Spouse name: Not on file    Number of children: Not on file    Years of education: Not on file    Highest education level: Not on file   Occupational " History    Occupation: counselor   Social Needs    Financial resource strain: Not on file    Food insecurity:     Worry: Not on file     Inability: Not on file    Transportation needs:     Medical: Not on file     Non-medical: Not on file   Tobacco Use    Smoking status: Former Smoker     Types: Cigarettes     Start date: 9/20/2009     Last attempt to quit: 7/3/2016     Years since quitting: 3.1    Smokeless tobacco: Never Used   Substance and Sexual Activity    Alcohol use: Yes     Alcohol/week: 0.6 oz     Types: 1 Glasses of wine per week    Drug use: No    Sexual activity: Yes     Partners: Male     Birth control/protection: OCP   Lifestyle    Physical activity:     Days per week: Not on file     Minutes per session: Not on file    Stress: Not on file   Relationships    Social connections:     Talks on phone: Not on file     Gets together: Not on file     Attends Rastafari service: Not on file     Active member of club or organization: Not on file     Attends meetings of clubs or organizations: Not on file     Relationship status: Not on file   Other Topics Concern    Not on file   Social History Narrative    The patient does not exercise regularly ().      Rates diet as fair.      She is not satisfied with weight.    She does not drink at least 1/2 gallon water daily.    She drinks 1 coffee/tea/caffeine-containing soft drinks daily.    Total sleep time at night is 7-8 hours.    She works 40 hours per week.    She does wear seat belts.    Hobbies include reading, movies.           ROS:     Constitutional : neg  HEENT neg  Resp neg  Cardiac pos chest pain under her left breast, left flank pain  GI neg   neg  Neuro neg  Heme/Immune: neg  Endo neg  Skin neg    PHYSICAL EXAM:  Vitals:    08/13/19 0405   BP: 113/70   Pulse: 87   Resp: 18   Temp: 98.7 °F (37.1 °C)         PHYSICAL EXAM:   general: comfortable, in no acute distress  VS: triage VS reviewed  HEENT: NC/AT, PERRL, EOMI  Neck: trachea  midline  CV: RRR, no m/r/g, no LE pitting edema, tender to palpation over the left lower chest under the left breast and also pain over the left posterior thorax  Resp: ctab  ABD:  ND, + normal BS, NT  Renal: No CVAT  Neuro: AAO x 3, 5/5 muscle strength in upper and lower extremities, sensation grossly intact, face symmetric, speech normal  Ext: no deformity, +2 symmetrical peripheral pulses          DATA & INTERVENTIONS:    LABS reviewed:  Labs Reviewed   COMPREHENSIVE METABOLIC PANEL - Abnormal; Notable for the following components:       Result Value    Albumin 3.4 (*)     Alkaline Phosphatase 49 (*)     ALT 46 (*)     All other components within normal limits   D DIMER, QUANTITATIVE - Abnormal; Notable for the following components:    D-Dimer 1.81 (*)     All other components within normal limits   CBC W/ AUTO DIFFERENTIAL - Abnormal; Notable for the following components:    Platelets 373 (*)     MPV 9.0 (*)     Mono # 1.1 (*)     All other components within normal limits   URINALYSIS, REFLEX TO URINE CULTURE - Abnormal; Notable for the following components:    Occult Blood UA 1+ (*)     All other components within normal limits    Narrative:     Preferred Collection Type->Urine, Clean Catch   LIPASE   TROPONIN I   URINALYSIS MICROSCOPIC    Narrative:     Preferred Collection Type->Urine, Clean Catch       RADIOLOGY reviewed:  Imaging Results          CTA Chest Non-Coronary - PE Study (Final result)  Result time 08/13/19 03:26:49    Final result by Rahul Carlson MD (08/13/19 03:26:49)                 Impression:      1.  No evidence of pulmonary thromboembolism or other acute intrathoracic abnormality.    2.  Incidentally noted 1.1 cm hypoattenuating right thyroid lobe nodule.  This was present on prior ultrasound of 12/27/2016.  Correlation for any interval biopsy advised.      Electronically signed by: Rahul Carlson MD  Date:    08/13/2019  Time:    03:26             Narrative:    EXAMINATION:  CTA  CHEST NON CORONARY    CLINICAL HISTORY:  Chest pain, acute, PE suspected, intermed prob, positive D-dimer;left sided chest pain, pos d-dimer;    TECHNIQUE:  Low dose axial images, sagittal and coronal reformations were obtained from the thoracic inlet to the lung bases following the IV administration of 75 mL of Omnipaque 350.  Contrast timing was optimized to evaluate the pulmonary arteries.  MIP images were performed.    COMPARISON:  None    FINDINGS:  There is a 1.1 cm hypoattenuating right thyroid lobe nodule.  The remaining visualized soft tissue structures at the base of the neck are unremarkable.    The thoracic aorta maintains normal caliber, contour, and course without significant atherosclerotic calcification within its course.  There is a two vessel aortic arch.  There is no evidence of aneurysmal dilation or dissection. The heart is not enlarged and there is no evidence of pericardial effusion.The esophagus maintains a normal course and caliber.There is no axillary, mediastinal, or hilar lymph node enlargement.    The trachea is midline and proximal airways are patent. The lungs are symmetrically expanded. There is no confluent airspace consolidation or pneumothorax.  There is a calcified left lower lobe granuloma.  There is mild bibasilar atelectasis.  There is no significant pleural effusion.    There is no evidence of pulmonary thromboembolism.    Limited images of the upper abdomen obtained during the course of this dedicated thoracic CT demonstrate no acute abnormality.    The osseous structures are unremarkable.                               X-Ray Abdomen AP 1 View (KUB) (Final result)  Result time 08/13/19 02:17:18    Final result by Bartolo Xie MD (08/13/19 02:17:18)                 Impression:      Moderate stool burden in the colon which may reflect constipation in the appropriate clinical setting.      Electronically signed by: Bartolo Xie MD  Date:    08/13/2019  Time:    02:17     "         Narrative:    EXAMINATION:  XR ABDOMEN AP 1 VIEW    CLINICAL HISTORY:  Unspecified abdominal pain    TECHNIQUE:  Single AP View of the abdomen was performed.    COMPARISON:  None.    FINDINGS:  Bowel gas pattern is nonobstructive.  No pathologic calcifications are detected.  Mild gaseous distension of the left hemicolon.  Moderate stool burden in the descending and sigmoid colon.  No large volume fecal burden in the rectum.  Bones are unremarkable.                               X-Ray Chest PA And Lateral (Final result)  Result time 08/13/19 02:15:54    Final result by Bartolo Xie MD (08/13/19 02:15:54)                 Impression:      No acute cardiopulmonary finding.      Electronically signed by: Bartolo Xie MD  Date:    08/13/2019  Time:    02:15             Narrative:    EXAMINATION:  XR CHEST PA AND LATERAL    CLINICAL HISTORY:  Provided history is "  Shortness of breath".    TECHNIQUE:  Frontal and lateral views of the chest were performed.    COMPARISON:  01/06/2019.    FINDINGS:  Cardiac silhouette is not enlarged.  No focal consolidation.  No sizable pleural effusion.  No pneumothorax.  No detrimental change in lung aeration.                                MEDICATIONS/FLUIDS:  Medications   acetaminophen tablet 1,000 mg (1,000 mg Oral Given 8/13/19 0147)   iohexol (OMNIPAQUE 350) injection 75 mL (75 mLs Intravenous Given 8/13/19 0318)         MDM:  Jessica Garcia is a 28 y.o. year old female who presents to the ED complaining of left lower chest pain and left posterior thorax pain that started today with shortness of breath pleuritic worse with lying down.  Patient on oral contraceptive    DDX includes but not limited to:  PE versus pneumonia versus pleural effusion versus pericardial effusion versus pancreatitis versus cholecystitis versus kidney stone vs pyelonephritis  No UTI symptoms no gross hematuria    Will check abdominal labs, D-dimer, trop, mildly tachycardic will check EKG, " chest x-ray abdominal x-ray  Tylenol for pain control  Point of care pregnancy test checked yesterday was negative    Labs ordered and interpreted:   UA negative for leukocytes and nitrates, +1 occult blood  CMP she with the mild elevation in ALT at 46 no other gross abnormalities  Lipase within normal limits, troponin normal, CBC with normal white count, hemoglobin and platelets slightly elevated at 373  D-dimer elevated 1.8  CXR (ordered and independently reviewed):  No infiltrate  Abdominal x-ray with moderate stool burden in the colon  CTA chest rule out PE1.  No evidence of pulmonary thromboembolism or other acute intrathoracic abnormality.  2.  Incidentally noted 1.1 cm hypoattenuating right thyroid lobe nodule.  This was present on prior ultrasound of 12/27/2016.  Correlation for any interval biopsy advised    EKG (independantly reviewed):  Heart rate 104 sinus tachy no acute ischemic change    Patient constipation with discharge polyethylene glycol  UA with microscopic blood presentation unlikely kidney stone with chest pain radiating up into the left shoulder.  Patient aware of the finding.  CT chest negative for PE but positive for right thyroid lobe nodule patient aware of the finding, to follow up with PCP.  Low suspicion this represents ACS    The patient has been carefully educated about symptoms and conditions that should prompt immediate return to the ED for recheck or further evaluation. Told to return immediately for any new or worsening or progressive symptoms. In addition, patient told to return to the ED if they are unable to obtain adequate o/p follow-up as they have been directed. Patient expressed good undertanding of these instructions.        IMPRESSION:  1.) chest pain  2.) constipation  3.  CT incidental finding with right thyroid lobe nodule      Dispo: Discharge    Critical Care Time: N/A       Lily Laurent MD  08/13/19 2416

## 2019-08-13 NOTE — ED NOTES
"8/13/19 0025 Pt called facility at this time wanting to know, "Is it normal to still be having the pain. The pain is getting worse and now I can't lay down." Pt asked, "Did you get the Rx filled?" Pt stated, "No, I was going to fill it in the morning." Pt informed per nurse to get Rx filled, take the medication. If the pain worsens or you feel worse, return for further evaluation and treatment."  "

## 2019-08-13 NOTE — TELEPHONE ENCOUNTER
Patient called to report the following:     -sharp pain left upper side   -is worsening  -sharp pain, breathing make it worse  -deep breath 7/10 pain   -fatigue   -advised to report to ED     Reason for Disposition   Sounds like a life-threatening emergency to the triager    Protocols used: CHEST PAIN-A-AH

## 2019-08-13 NOTE — ED NOTES
Reports she is still recovering from dx of mono x 1 month. Reports having LUQ pain 7/10 since about 1730 today. Denies N/V/D/C. Denies SOB. Denies chest pain

## 2019-08-14 ENCOUNTER — PATIENT MESSAGE (OUTPATIENT)
Dept: FAMILY MEDICINE | Facility: CLINIC | Age: 29
End: 2019-08-14

## 2019-08-14 ENCOUNTER — OFFICE VISIT (OUTPATIENT)
Dept: FAMILY MEDICINE | Facility: CLINIC | Age: 29
End: 2019-08-14
Attending: FAMILY MEDICINE
Payer: COMMERCIAL

## 2019-08-14 VITALS
HEIGHT: 65 IN | RESPIRATION RATE: 16 BRPM | DIASTOLIC BLOOD PRESSURE: 63 MMHG | SYSTOLIC BLOOD PRESSURE: 109 MMHG | HEART RATE: 94 BPM | WEIGHT: 179.19 LBS | BODY MASS INDEX: 29.85 KG/M2

## 2019-08-14 DIAGNOSIS — R10.12 LUQ PAIN: ICD-10-CM

## 2019-08-14 DIAGNOSIS — K59.00 CONSTIPATION, UNSPECIFIED CONSTIPATION TYPE: Primary | ICD-10-CM

## 2019-08-14 PROCEDURE — 99214 OFFICE O/P EST MOD 30 MIN: CPT | Mod: S$GLB,,, | Performed by: FAMILY MEDICINE

## 2019-08-14 PROCEDURE — 74019 RADEX ABDOMEN 2 VIEWS: CPT | Mod: S$GLB,,, | Performed by: RADIOLOGY

## 2019-08-14 PROCEDURE — 99214 PR OFFICE/OUTPT VISIT, EST, LEVL IV, 30-39 MIN: ICD-10-PCS | Mod: S$GLB,,, | Performed by: FAMILY MEDICINE

## 2019-08-14 PROCEDURE — 99999 PR PBB SHADOW E&M-EST. PATIENT-LVL III: CPT | Mod: PBBFAC,,, | Performed by: FAMILY MEDICINE

## 2019-08-14 PROCEDURE — 74019 XR ABDOMEN FLAT AND ERECT: ICD-10-PCS | Mod: S$GLB,,, | Performed by: RADIOLOGY

## 2019-08-14 PROCEDURE — 99999 PR PBB SHADOW E&M-EST. PATIENT-LVL III: ICD-10-PCS | Mod: PBBFAC,,, | Performed by: FAMILY MEDICINE

## 2019-08-14 PROCEDURE — 3008F PR BODY MASS INDEX (BMI) DOCUMENTED: ICD-10-PCS | Mod: CPTII,S$GLB,, | Performed by: FAMILY MEDICINE

## 2019-08-14 PROCEDURE — 3008F BODY MASS INDEX DOCD: CPT | Mod: CPTII,S$GLB,, | Performed by: FAMILY MEDICINE

## 2019-08-14 NOTE — PROGRESS NOTES
Subjective:       Patient ID: Jessica Garcia is a 28 y.o. female.    Chief Complaint: Chest Pain and Shortness of Breath    HPI     The patient presents the office with continued complaints of shortness of breath and left upper quadrant/chest pain.  She has been evaluated in the emergency room and both [facility] in [facility] late evening/early morning of 8/12-13/2019.  Evaluation included laboratory investigation, flat/erect films of the abdomen, ultrasound the abdomen, and CT a chest.  I reviewed all these results, as well as the emergency department records from both facilities.  The most significant finding was a moderate to large stool burden on flat/erect films the abdomen, with a dilated loop of bowel in the left upper quadrant.  She was diagnosed with constipation, and discharged on polyethylene glycol.    Last night, she did take magnesium citrate instead of MiraLax, and had a brownish watery stool, with very little solid stool noted.    Patient Active Problem List   Diagnosis    Thyroid nodule    Vitamin D deficiency    Disc displacement, lumbar    [place] cardiac risk <10% in next 10 years       Current Outpatient Medications:     albuterol (PROVENTIL/VENTOLIN HFA) 90 mcg/actuation inhaler, Inhale 1-2 puffs into the lungs every 6 (six) hours as needed for Wheezing. Rescue, Disp: 1 Inhaler, Rfl: 0    hydrocortisone 2.5 % cream, Apply topically 2 (two) times daily., Disp: 28 g, Rfl: 1    ibuprofen (ADVIL,MOTRIN) 800 MG tablet, Take 800 mg by mouth as needed for Pain., Disp: , Rfl:     metoclopramide HCl (REGLAN) 10 MG tablet, Take 1 tablet (10 mg total) by mouth every 6 (six) hours as needed., Disp: 30 tablet, Rfl: 0    norethindrone-e.estradiol-iron (LO LOESTRIN FE) 1 mg-10 mcg (24)/10 mcg (2) Tab, Take by mouth., Disp: , Rfl:     polyethylene glycol (GLYCOLAX) 17 gram/dose powder, Take 17 g by mouth once daily., Disp: 235 g, Rfl: 0    The  "following portions of the patient's history were reviewed and updated as appropriate: allergies, past family history, past medical history, past social history and past surgical history.    Review of Systems   Constitutional: Negative for fatigue and unexpected weight change.   HENT: Negative for ear discharge, ear pain, hearing loss, tinnitus and voice change.    Respiratory: Positive for shortness of breath. Negative for cough.    Cardiovascular: Positive for chest pain. Negative for palpitations and leg swelling.   Gastrointestinal: Positive for abdominal pain. Negative for blood in stool, constipation, diarrhea, nausea and vomiting.   Genitourinary: Negative for difficulty urinating, dyspareunia, dysuria, frequency and hematuria.   Musculoskeletal: Negative for arthralgias, back pain and myalgias.   Skin: Negative for rash.   Neurological: Negative for dizziness, weakness, light-headedness and headaches.   Hematological: Does not bruise/bleed easily.   Psychiatric/Behavioral: Negative for dysphoric mood and sleep disturbance. The patient is not nervous/anxious.          Objective:      /63 (BP Location: Left arm, Patient Position: Sitting, BP Method: Medium (Automatic))   Pulse 94   Resp 16   Ht 5' 5" (1.651 m)   Wt 81.3 kg (179 lb 3.2 oz)   BMI 29.82 kg/m²     Physical Exam   Constitutional: She is oriented to person, place, and time. She appears well-developed and well-nourished. She is cooperative.   HENT:   Head: Normocephalic and atraumatic.   Nose: Nose normal.   Mouth/Throat: Oropharynx is clear and moist and mucous membranes are normal.   Eyes: Conjunctivae are normal. No scleral icterus.   Neck: Neck supple. No JVD present. Carotid bruit is not present. No thyromegaly present.   Cardiovascular: Normal rate, regular rhythm, normal heart sounds and normal pulses. Exam reveals no gallop and no friction rub.   No murmur heard.  Pulmonary/Chest: Effort normal and breath sounds normal. She has no " "wheezes. She has no rhonchi. She has no rales.   Abdominal: Soft. She exhibits no distension and no mass. Bowel sounds are decreased. There is no splenomegaly or hepatomegaly. There is tenderness in the left upper quadrant. There is no rigidity, no rebound, no guarding and no CVA tenderness.   Musculoskeletal: Normal range of motion. She exhibits no edema or tenderness.   Lymphadenopathy:     She has no cervical adenopathy.     She has no axillary adenopathy.   Neurological: She is alert and oriented to person, place, and time. She has normal strength and normal reflexes. No cranial nerve deficit or sensory deficit.   Skin: Skin is warm and dry.   Psychiatric: She has a normal mood and affect. Her speech is normal.   Vitals reviewed.        Assessment:       1. Constipation, unspecified constipation type    2. LUQ pain          Plan:       Attempted to reassure patient that her given diagnosis could be the cause of her symptoms.  Repeat flat/erect abd films.  Continue MiraLAX.  Further recommendations to follow after above.      "This note will not be shared with the patient."  "

## 2019-08-14 NOTE — PATIENT INSTRUCTIONS
Jessica,     We are always striving for excellence. Should you receive a patient experience survey electronically or by mail, we would appreciate if you would take a few moments to give us your feedback. These surveys let us know our strengths as well as areas of opportunity for improvement to better serve you.    Thank you for your time,  Carolyn Pitt LPN    Your test results will be communicated to you via : [facility], Telephone or Letter.   If you have not received your test results in one week, please contact the clinic at [phone].

## 2019-08-14 NOTE — TELEPHONE ENCOUNTER
Reason for Disposition   Severe difficulty breathing (e.g., struggling for each breath, speaks in single words)    Protocols used: CHEST PAIN-A-AH

## 2019-09-15 ENCOUNTER — PATIENT MESSAGE (OUTPATIENT)
Dept: FAMILY MEDICINE | Facility: CLINIC | Age: 29
End: 2019-09-15

## 2019-09-16 ENCOUNTER — PATIENT MESSAGE (OUTPATIENT)
Dept: FAMILY MEDICINE | Facility: CLINIC | Age: 29
End: 2019-09-16

## 2019-10-17 ENCOUNTER — OFFICE VISIT (OUTPATIENT)
Dept: FAMILY MEDICINE | Facility: CLINIC | Age: 29
End: 2019-10-17
Attending: FAMILY MEDICINE
Payer: COMMERCIAL

## 2019-10-17 ENCOUNTER — TELEPHONE (OUTPATIENT)
Dept: DERMATOLOGY | Facility: CLINIC | Age: 29
End: 2019-10-17

## 2019-10-17 VITALS
BODY MASS INDEX: 30.35 KG/M2 | WEIGHT: 182.19 LBS | SYSTOLIC BLOOD PRESSURE: 104 MMHG | DIASTOLIC BLOOD PRESSURE: 70 MMHG | OXYGEN SATURATION: 97 % | HEART RATE: 97 BPM | HEIGHT: 65 IN

## 2019-10-17 DIAGNOSIS — L30.9 DERMATITIS: Primary | ICD-10-CM

## 2019-10-17 PROCEDURE — 99213 OFFICE O/P EST LOW 20 MIN: CPT | Mod: S$GLB,,, | Performed by: FAMILY MEDICINE

## 2019-10-17 PROCEDURE — 99213 PR OFFICE/OUTPT VISIT, EST, LEVL III, 20-29 MIN: ICD-10-PCS | Mod: S$GLB,,, | Performed by: FAMILY MEDICINE

## 2019-10-17 PROCEDURE — 3008F PR BODY MASS INDEX (BMI) DOCUMENTED: ICD-10-PCS | Mod: CPTII,S$GLB,, | Performed by: FAMILY MEDICINE

## 2019-10-17 PROCEDURE — 99999 PR PBB SHADOW E&M-EST. PATIENT-LVL III: CPT | Mod: PBBFAC,,, | Performed by: FAMILY MEDICINE

## 2019-10-17 PROCEDURE — 99999 PR PBB SHADOW E&M-EST. PATIENT-LVL III: ICD-10-PCS | Mod: PBBFAC,,, | Performed by: FAMILY MEDICINE

## 2019-10-17 PROCEDURE — 3008F BODY MASS INDEX DOCD: CPT | Mod: CPTII,S$GLB,, | Performed by: FAMILY MEDICINE

## 2019-10-17 NOTE — PROGRESS NOTES
Subjective:       Patient ID: Jessica Garcia is a 29 y.o. female.    Chief Complaint: Rash    HPI   28 yo F presents complaining of a rash on her dorsal upper and lower arms that began 11 days ago. She states it waxes and wanes and is intermittently itchy. She denies any new detergents or soaps. She shares a bed with her boyfriend who does not have similar symptoms. She has tried topical hydrocortisone cream, PO benadryl, and PO zyrte with no relief.  No fever, arthralgias, or any further symptoms at this time.    Patient Active Problem List   Diagnosis    Thyroid nodule    Vitamin D deficiency    Disc displacement, lumbar    [place] cardiac risk <10% in next 10 years       Current Outpatient Medications:     albuterol (PROVENTIL/VENTOLIN HFA) 90 mcg/actuation inhaler, Inhale 1-2 puffs into the lungs every 6 (six) hours as needed for Wheezing. Rescue, Disp: 1 Inhaler, Rfl: 0    ibuprofen (ADVIL,MOTRIN) 800 MG tablet, Take 800 mg by mouth as needed for Pain., Disp: , Rfl:     norethindrone-e.estradiol-iron (LO LOESTRIN FE) 1 mg-10 mcg (24)/10 mcg (2) Tab, Take by mouth., Disp: , Rfl:     polyethylene glycol (GLYCOLAX) 17 gram/dose powder, Take 17 g by mouth once daily., Disp: 235 g, Rfl: 0    hydrocortisone 2.5 % cream, Apply topically 2 (two) times daily., Disp: 28 g, Rfl: 1    metoclopramide HCl (REGLAN) 10 MG tablet, Take 1 tablet (10 mg total) by mouth every 6 (six) hours as needed., Disp: 30 tablet, Rfl: 0    The following portions of the patient's history were reviewed and updated as appropriate: allergies, past family history, past medical history, past social history and past surgical history.    Review of Systems    Constitutional: Negative for fever.   HENT: Negative for congestion.    Eyes: Negative for redness.   Respiratory: Negative for shortness of breath.    Cardiovascular: Negative for leg swelling.   Gastrointestinal: Negative for diarrhea.   Musculoskeletal: Negative for arthralgias  "and neck pain.   Allergic/Immunologic: Negative for environmental allergies and food allergies.     Objective:      /70   Pulse 97   Ht 5' 5" (1.651 m)   Wt 82.6 kg (182 lb 3.2 oz)   SpO2 97%   BMI 30.32 kg/m²     Physical Exam    A few very small papules noted on the medial form an anterior upper arm bilaterally.  No significant dryness.  No erythema noted. No skin changes.    Assessment:       1. Dermatitis        Plan:       Refer to dermatologist for definitive diagnosis.    "This note will not be shared with the patient."  "

## 2019-10-17 NOTE — MEDICAL/APP STUDENT
Subjective:       Patient ID: Jessica Garcia is a 29 y.o. female.    Chief Complaint: Rash    30 yo F presents complaining of a rash on her dorsal upper and lower arms that began 11 days ago. She states it waxes and wanes and is intermittently itchy. She denies any new detergents or soaps. She shares a bed with her boyfriend who does not have similar symptoms. She has tried topical hydrocortisone cream, PO benadryl, and PO zyrte with no relief.  No fever, arthralgias, or any further symptoms at this time.    Review of Systems   Constitutional: Negative for fever.   HENT: Negative for congestion.    Eyes: Negative for redness.   Respiratory: Negative for shortness of breath.    Cardiovascular: Negative for leg swelling.   Gastrointestinal: Negative for diarrhea.   Musculoskeletal: Negative for arthralgias and neck pain.   Allergic/Immunologic: Negative for environmental allergies and food allergies.         Current Outpatient Medications:     albuterol (PROVENTIL/VENTOLIN HFA) 90 mcg/actuation inhaler, Inhale 1-2 puffs into the lungs every 6 (six) hours as needed for Wheezing. Rescue, Disp: 1 Inhaler, Rfl: 0    ibuprofen (ADVIL,MOTRIN) 800 MG tablet, Take 800 mg by mouth as needed for Pain., Disp: , Rfl:     norethindrone-e.estradiol-iron (LO LOESTRIN FE) 1 mg-10 mcg (24)/10 mcg (2) Tab, Take by mouth., Disp: , Rfl:     polyethylene glycol (GLYCOLAX) 17 gram/dose powder, Take 17 g by mouth once daily., Disp: 235 g, Rfl: 0    hydrocortisone 2.5 % cream, Apply topically 2 (two) times daily., Disp: 28 g, Rfl: 1    metoclopramide HCl (REGLAN) 10 MG tablet, Take 1 tablet (10 mg total) by mouth every 6 (six) hours as needed., Disp: 30 tablet, Rfl: 0  Review of patient's allergies indicates:   Allergen Reactions    Strawberries [strawberry] Swelling     Past Medical History:   Diagnosis Date    Herniation of lumbar intervertebral disc     Hypercholesteremia     Tachycardia      No past surgical history on  file.  Family History   Problem Relation Age of Onset    Thyroid disease Mother     Hypertension Sister     Heart disease Maternal Grandfather     Hypertension Paternal Grandmother     Hyperlipidemia Sister      Social History     Socioeconomic History    Marital status: Single     Spouse name: Not on file    Number of children: Not on file    Years of education: Not on file    Highest education level: Not on file   Occupational History    Occupation: counselor   Social Needs    Financial resource strain: Not on file    Food insecurity:     Worry: Not on file     Inability: Not on file    Transportation needs:     Medical: Not on file     Non-medical: Not on file   Tobacco Use    Smoking status: Former Smoker     Types: Cigarettes     Start date: 9/20/2009     Last attempt to quit: 7/3/2016     Years since quitting: 3.2    Smokeless tobacco: Never Used   Substance and Sexual Activity    Alcohol use: Yes     Alcohol/week: 1.0 standard drinks     Types: 1 Glasses of wine per week    Drug use: No    Sexual activity: Yes     Partners: Male     Birth control/protection: OCP   Lifestyle    Physical activity:     Days per week: Not on file     Minutes per session: Not on file    Stress: Not on file   Relationships    Social connections:     Talks on phone: Not on file     Gets together: Not on file     Attends Religion service: Not on file     Active member of club or organization: Not on file     Attends meetings of clubs or organizations: Not on file     Relationship status: Not on file   Other Topics Concern    Not on file   Social History Narrative    The patient does not exercise regularly ().      Rates diet as fair.      She is not satisfied with weight.    She does not drink at least 1/2 gallon water daily.    She drinks 1 coffee/tea/caffeine-containing soft drinks daily.    Total sleep time at night is 7-8 hours.    She works 40 hours per week.    She does wear seat belts.    Hobbies include  "reading, movies.            Objective:       Vitals:    10/17/19 0837   BP: 104/70   Pulse: 97   SpO2: 97%   Weight: 82.6 kg (182 lb 3.2 oz)   Height: 5' 5" (1.651 m)     Physical Exam   Skin: Rash (1 mm papules over bilateral dorsal arm, forearm, and bilateral upper back) noted.       Assessment:     28 yo F with white papular rash over arms for 11 days. Doubt allergic, infectious, or autoimmune etiology.  No diagnosis found.    Plan:   1. Referral to Dermatology  2. Instructed patient not to treat rash until dermatology evaluation.    Madeline Barlow, MS4  "

## 2019-10-18 ENCOUNTER — TELEPHONE (OUTPATIENT)
Dept: DERMATOLOGY | Facility: CLINIC | Age: 29
End: 2019-10-18

## 2019-10-19 ENCOUNTER — PATIENT OUTREACH (OUTPATIENT)
Dept: ADMINISTRATIVE | Facility: OTHER | Age: 29
End: 2019-10-19

## 2019-10-21 ENCOUNTER — OFFICE VISIT (OUTPATIENT)
Dept: DERMATOLOGY | Facility: CLINIC | Age: 29
End: 2019-10-21
Payer: COMMERCIAL

## 2019-10-21 DIAGNOSIS — L50.8 URTICARIA, ACUTE: ICD-10-CM

## 2019-10-21 DIAGNOSIS — L29.9 PRURITUS: Primary | ICD-10-CM

## 2019-10-21 PROCEDURE — 99203 PR OFFICE/OUTPT VISIT, NEW, LEVL III, 30-44 MIN: ICD-10-PCS | Mod: S$GLB,,, | Performed by: DERMATOLOGY

## 2019-10-21 PROCEDURE — 99203 OFFICE O/P NEW LOW 30 MIN: CPT | Mod: S$GLB,,, | Performed by: DERMATOLOGY

## 2019-10-21 RX ORDER — TRIAMCINOLONE ACETONIDE 1 MG/G
CREAM TOPICAL
Qty: 80 G | Refills: 3 | Status: SHIPPED | OUTPATIENT
Start: 2019-10-21

## 2019-10-21 RX ORDER — HYDROXYZINE HYDROCHLORIDE 25 MG/1
TABLET, FILM COATED ORAL
Qty: 60 TABLET | Refills: 0 | Status: SHIPPED | OUTPATIENT
Start: 2019-10-21 | End: 2021-03-16

## 2019-10-21 NOTE — PROGRESS NOTES
Subjective:       Patient ID:  Jessica Garcia is a 29 y.o. female who presents for   Chief Complaint   Patient presents with    Rash     all over     Rash  - Initial  Affected locations: diffuse  Duration: 2 weeks  Signs / symptoms: redness, itching, rough, irritated and inflamed  Severity: mild to moderate  Timing: constant (but lesions usually last less than 24 hours)  Aggravated by: scratching  Relieving factors/Treatments tried: OTC hydrocortisone and antihistamines  Improvement on treatment: moderate (did improve with zyrtec)    Denies any changes to personal care products, but she did recently change laundry detergents to Gain Intense.    Review of Systems   Musculoskeletal: Negative for joint swelling and arthralgias.   Skin: Positive for itching, rash and dry skin.   Hematologic/Lymphatic: Does not bruise/bleed easily.        Objective:    Physical Exam   Constitutional: She appears well-developed and well-nourished. No distress.   HENT:   Mouth/Throat: Lips normal.    Eyes: Lids are normal.  No conjunctival no injection.   Neurological: She is alert and oriented to person, place, and time. She is not disoriented.   Psychiatric: She has a normal mood and affect.   Skin:   Areas Examined (abnormalities noted in diagram):   Head / Face Inspection Performed  Neck Inspection Performed  Chest / Axilla Inspection Performed  Abdomen Inspection Performed  Back Inspection Performed  RUE Inspected  LUE Inspection Performed  RLE Inspected  LLE Inspection Performed  Nails and Digits Inspection Performed                  Diagram Legend     Erythematous scaling macule/papule c/w actinic keratosis       Vascular papule c/w angioma      Pigmented verrucoid papule/plaque c/w seborrheic keratosis      Yellow umbilicated papule c/w sebaceous hyperplasia      Irregularly shaped tan macule c/w lentigo     1-2 mm smooth white papules consistent with Milia      Movable subcutaneous cyst with punctum c/w epidermal inclusion  cyst      Subcutaneous movable cyst c/w pilar cyst      Firm pink to brown papule c/w dermatofibroma      Pedunculated fleshy papule(s) c/w skin tag(s)      Evenly pigmented macule c/w junctional nevus     Mildly variegated pigmented, slightly irregular-bordered macule c/w mildly atypical nevus      Flesh colored to evenly pigmented papule c/w intradermal nevus       Pink pearly papule/plaque c/w basal cell carcinoma      Erythematous hyperkeratotic cursted plaque c/w SCC      Surgical scar with no sign of skin cancer recurrence      Open and closed comedones      Inflammatory papules and pustules      Verrucoid papule consistent consistent with wart     Erythematous eczematous patches and plaques     Dystrophic onycholytic nail with subungual debris c/w onychomycosis     Umbilicated papule    Erythematous-base heme-crusted tan verrucoid plaque consistent with inflamed seborrheic keratosis     Erythematous Silvery Scaling Plaque c/w Psoriasis     See annotation      Assessment / Plan:        Pruritus  Urticaria, acute (vs irritant contact dermatitis)  Recommended Zyrtec (cetirizine) or Allegra (fexofenadine) 1-2x/day.  -     hydrOXYzine HCl (ATARAX) 25 MG tablet; Take 1-2 tabs PO qhs prn itching. Do not drive or operate machinery while taking this medicine.  Dispense: 60 tablet; Refill: 0  -     triamcinolone acetonide 0.1% (KENALOG) 0.1 % cream; Apply to affected areas of body BID prn rash. Do not use on face, underarms, or groin.  Dispense: 80 g; Refill: 3    - Good skin care regimen was discussed, including limiting to one bath or shower per day, using lukewarm water with mild soap, and applying a moisturizing cream to skin 1-2 times per day.   - Recommended Dove sensitive skin soap, CeraVe moisturizing cream or healing ointment, and All Free and Clear detergent.  - Handout was reviewed with and provided to the patient.    Follow up in about 4 weeks (around 11/18/2019), or if symptoms worsen or fail to improve.

## 2019-10-21 NOTE — LETTER
October 21, 2019      Issac Cobian Jr., MD  [address]  [address]  [facility] [address]           [facility] - Dermatology  [address] [place] [id number]  Phone: [phone]  Fax: [phone]          Patient: Jessica Garcia   MR Number: [id number]   YOB: 1990   Date of Visit: 10/21/2019       Dear Dr. Issac Cobian Jr.:    Thank you for referring Jessica Garcia to me for evaluation. Attached you will find relevant portions of my assessment and plan of care.    If you have questions, please do not hesitate to call me. I look forward to following Jessica Garcia along with you.    Sincerely,    Jessica Graves MD    Enclosure  CC:  No Recipients    If you would like to receive this communication electronically, please contact [email or web address] or [phone] to request more information on [employer] access.    For providers and/or their staff who would like to refer a patient to [facility], please contact us through our one-stop-shop provider referral line, [facility], at [phone].    If you feel you have received this communication in error or would no longer like to receive these types of communications, please e-mail [email or web address]

## 2019-11-20 ENCOUNTER — PATIENT MESSAGE (OUTPATIENT)
Dept: DERMATOLOGY | Facility: CLINIC | Age: 29
End: 2019-11-20

## 2019-12-05 ENCOUNTER — PATIENT OUTREACH (OUTPATIENT)
Dept: ADMINISTRATIVE | Facility: OTHER | Age: 29
End: 2019-12-05

## 2019-12-09 ENCOUNTER — OFFICE VISIT (OUTPATIENT)
Dept: DERMATOLOGY | Facility: CLINIC | Age: 29
End: 2019-12-09
Payer: COMMERCIAL

## 2019-12-09 DIAGNOSIS — L50.8 URTICARIA, ACUTE: ICD-10-CM

## 2019-12-09 DIAGNOSIS — L29.9 PRURITUS: ICD-10-CM

## 2019-12-09 DIAGNOSIS — L50.3 DERMATOGRAPHIC URTICARIA: Primary | ICD-10-CM

## 2019-12-09 PROCEDURE — 99213 PR OFFICE/OUTPT VISIT, EST, LEVL III, 20-29 MIN: ICD-10-PCS | Mod: S$GLB,,, | Performed by: DERMATOLOGY

## 2019-12-09 PROCEDURE — 99213 OFFICE O/P EST LOW 20 MIN: CPT | Mod: S$GLB,,, | Performed by: DERMATOLOGY

## 2019-12-09 NOTE — PROGRESS NOTES
Subjective:       Patient ID:  Jessica Garcia is a 29 y.o. female who presents for   Chief Complaint   Patient presents with    Rash     Rash  - Follow-up  Symptom course: worsening  Currently using: zyrtec 1x/day, TAC cream 1x/day.  Affected locations: chest  Signs / symptoms: itching and spreading (rash only pops up after she scratches; new area on chest last week)      Review of Systems   Musculoskeletal: Negative for joint swelling and arthralgias.   Skin: Positive for itching, rash and dry skin.   Hematologic/Lymphatic: Does not bruise/bleed easily.        Objective:    Physical Exam   Constitutional: She appears well-developed and well-nourished. No distress.   HENT:   Mouth/Throat: Lips normal.    Eyes: Lids are normal.  No conjunctival no injection.   Neurological: She is alert and oriented to person, place, and time. She is not disoriented.   Psychiatric: She has a normal mood and affect.   Skin:   Areas Examined (abnormalities noted in diagram):   Head / Face Inspection Performed  Neck Inspection Performed  Chest / Axilla Inspection Performed  Abdomen Inspection Performed  Back Inspection Performed  RUE Inspected  LUE Inspection Performed  RLE Inspected  LLE Inspection Performed  Nails and Digits Inspection Performed              Diagram Legend     Erythematous scaling macule/papule c/w actinic keratosis       Vascular papule c/w angioma      Pigmented verrucoid papule/plaque c/w seborrheic keratosis      Yellow umbilicated papule c/w sebaceous hyperplasia      Irregularly shaped tan macule c/w lentigo     1-2 mm smooth white papules consistent with Milia      Movable subcutaneous cyst with punctum c/w epidermal inclusion cyst      Subcutaneous movable cyst c/w pilar cyst      Firm pink to brown papule c/w dermatofibroma      Pedunculated fleshy papule(s) c/w skin tag(s)      Evenly pigmented macule c/w junctional nevus     Mildly variegated pigmented, slightly irregular-bordered macule c/w mildly  atypical nevus      Flesh colored to evenly pigmented papule c/w intradermal nevus       Pink pearly papule/plaque c/w basal cell carcinoma      Erythematous hyperkeratotic cursted plaque c/w SCC      Surgical scar with no sign of skin cancer recurrence      Open and closed comedones      Inflammatory papules and pustules      Verrucoid papule consistent consistent with wart     Erythematous eczematous patches and plaques     Dystrophic onycholytic nail with subungual debris c/w onychomycosis     Umbilicated papule    Erythematous-base heme-crusted tan verrucoid plaque consistent with inflamed seborrheic keratosis     Erythematous Silvery Scaling Plaque c/w Psoriasis     See annotation      Assessment / Plan:        Dermatographic urticaria  Urticaria, acute  Pruritus  Acute urticaria seems to have mostly resolved. Now with residual dermatographic urticaria. Treatment remains the same with antihistamines. Can d/c hydroxyzine as she is no longer itching at night.  Recommended Zyrtec (cetirizine) or Allegra (fexofenadine) 2x/day until improved, then can decrease to 1x/day.       Follow up in about 4 weeks (around 1/6/2020), or if symptoms worsen or fail to improve.

## 2019-12-14 ENCOUNTER — OFFICE VISIT (OUTPATIENT)
Dept: FAMILY MEDICINE | Facility: CLINIC | Age: 29
End: 2019-12-14
Attending: FAMILY MEDICINE
Payer: COMMERCIAL

## 2019-12-14 VITALS
DIASTOLIC BLOOD PRESSURE: 80 MMHG | WEIGHT: 178.13 LBS | SYSTOLIC BLOOD PRESSURE: 114 MMHG | OXYGEN SATURATION: 98 % | HEART RATE: 88 BPM | BODY MASS INDEX: 29.68 KG/M2 | HEIGHT: 65 IN

## 2019-12-14 DIAGNOSIS — R10.2 PELVIC PAIN IN FEMALE: Primary | ICD-10-CM

## 2019-12-14 DIAGNOSIS — N89.8 VAGINAL DISCHARGE: ICD-10-CM

## 2019-12-14 DIAGNOSIS — Z30.41 ORAL CONTRACEPTIVE USE: ICD-10-CM

## 2019-12-14 PROCEDURE — 87801 DETECT AGNT MULT DNA AMPLI: CPT

## 2019-12-14 PROCEDURE — 99214 OFFICE O/P EST MOD 30 MIN: CPT | Mod: S$GLB,,, | Performed by: FAMILY MEDICINE

## 2019-12-14 PROCEDURE — 87661 TRICHOMONAS VAGINALIS AMPLIF: CPT

## 2019-12-14 PROCEDURE — 99214 PR OFFICE/OUTPT VISIT, EST, LEVL IV, 30-39 MIN: ICD-10-PCS | Mod: S$GLB,,, | Performed by: FAMILY MEDICINE

## 2019-12-14 PROCEDURE — 99999 PR PBB SHADOW E&M-EST. PATIENT-LVL IV: CPT | Mod: PBBFAC,,, | Performed by: FAMILY MEDICINE

## 2019-12-14 PROCEDURE — 99999 PR PBB SHADOW E&M-EST. PATIENT-LVL IV: ICD-10-PCS | Mod: PBBFAC,,, | Performed by: FAMILY MEDICINE

## 2019-12-14 PROCEDURE — 87481 CANDIDA DNA AMP PROBE: CPT | Mod: 59

## 2019-12-14 NOTE — PROGRESS NOTES
Subjective:       Patient ID: Jessica Garcia is a 29 y.o. female.    Chief Complaint: Pelvic Pain    Pelvic Pain   The patient's primary symptoms include pelvic pain and vaginal discharge. This is a new problem. The current episode started in the past 7 days (~ 2-3 days ago). The problem occurs daily. The problem has been waxing and waning. The pain is mild (severe 1` day, but improving). The problem affects the left side. She is not pregnant. Pertinent negatives include no dysuria, flank pain or frequency. The symptoms are aggravated by activity. She has tried NSAIDs and acetaminophen for the symptoms. The treatment provided no relief. She is sexually active. No, her partner does not have an STD. She uses oral contraceptives for contraception. Her menstrual history has been regular. There is no history of an abdominal surgery, a  section, an ectopic pregnancy, a gynecological surgery, an STD or a terminated pregnancy.       Patient Active Problem List   Diagnosis    Thyroid nodule    Vitamin D deficiency    Disc displacement, lumbar    [place] cardiac risk <10% in next 10 years    Oral contraceptive use       Current Outpatient Medications:     albuterol (PROVENTIL/VENTOLIN HFA) 90 mcg/actuation inhaler, Inhale 1-2 puffs into the lungs every 6 (six) hours as needed for Wheezing. Rescue, Disp: 1 Inhaler, Rfl: 0    hydrocortisone 2.5 % cream, Apply topically 2 (two) times daily., Disp: 28 g, Rfl: 1    hydrOXYzine HCl (ATARAX) 25 MG tablet, Take 1-2 tabs PO qhs prn itching. Do not drive or operate machinery while taking this medicine., Disp: 60 tablet, Rfl: 0    norethindrone-e.estradiol-iron (LO LOESTRIN FE) 1 mg-10 mcg (24)/10 mcg (2) Tab, Take by mouth., Disp: , Rfl:     triamcinolone acetonide 0.1% (KENALOG) 0.1 % cream, Apply to affected areas of body BID prn rash. Do not use on face, underarms, or groin., Disp: 80 g, Rfl: 3    ibuprofen (ADVIL,MOTRIN) 800 MG tablet, Take 800 mg by mouth  "as needed for Pain., Disp: , Rfl:     metoclopramide HCl (REGLAN) 10 MG tablet, Take 1 tablet (10 mg total) by mouth every 6 (six) hours as needed., Disp: 30 tablet, Rfl: 0    polyethylene glycol (GLYCOLAX) 17 gram/dose powder, Take 17 g by mouth once daily., Disp: 235 g, Rfl: 0    The following portions of the patient's history were reviewed and updated as appropriate: allergies, past family history, past medical history, past social history and past surgical history.    Review of Systems   Genitourinary: Positive for pelvic pain and vaginal discharge. Negative for dysuria, flank pain, frequency, menstrual problem, vaginal bleeding and vaginal pain.       Objective:      /80   Pulse 88   Ht 5' 5" (1.651 m)   Wt 80.8 kg (178 lb 1.6 oz)   SpO2 98%   BMI 29.64 kg/m²     Physical Exam   Constitutional: She is oriented to person, place, and time. She appears well-developed and well-nourished.   HENT:   Head: Normocephalic and atraumatic.   Abdominal: There is no tenderness.   Genitourinary: Uterus normal. Pelvic exam was performed with patient supine. There is no rash, tenderness or lesion on the right labia. There is no rash, tenderness or lesion on the left labia. Cervix exhibits no motion tenderness. Right adnexum displays no mass and no tenderness. Left adnexum displays tenderness (3+/4+). Left adnexum displays no mass. There is tenderness (along left vaginal wall) in the vagina. Vaginal discharge (thick, white) found.   Genitourinary Comments: Cervix not well visualized due to discharge and discomfort during exam   Neurological: She is alert and oriented to person, place, and time.   Skin: Skin is warm and dry.   Psychiatric: She has a normal mood and affect.       Assessment:       1. Pelvic pain in female, improving    2. Vaginal discharge    3. Oral contraceptive use          Plan:       Reviewed findings with patient.    Orders Placed This Encounter    Vaginosis Screen by DNA Probe    US Pelvis " "Complete Non OB    Urinalysis    hCG, quantitative    CBC auto differential    Comprehensive metabolic panel     Further recommendations to follow after above.  To ED if symptoms worsen.      "This note will not be shared with the patient."  "

## 2019-12-16 ENCOUNTER — LAB VISIT (OUTPATIENT)
Dept: LAB | Facility: HOSPITAL | Age: 29
End: 2019-12-16
Attending: FAMILY MEDICINE
Payer: COMMERCIAL

## 2019-12-16 DIAGNOSIS — Z30.41 ORAL CONTRACEPTIVE USE: ICD-10-CM

## 2019-12-16 DIAGNOSIS — N89.8 VAGINAL DISCHARGE: ICD-10-CM

## 2019-12-16 DIAGNOSIS — R10.2 PELVIC PAIN IN FEMALE: ICD-10-CM

## 2019-12-16 LAB
ALBUMIN SERPL BCP-MCNC: 3.4 G/DL (ref 3.5–5.2)
ALP SERPL-CCNC: 34 U/L (ref 55–135)
ALT SERPL W/O P-5'-P-CCNC: 22 U/L (ref 10–44)
ANION GAP SERPL CALC-SCNC: 12 MMOL/L (ref 8–16)
AST SERPL-CCNC: 21 U/L (ref 10–40)
BACTERIAL VAGINOSIS DNA: NEGATIVE
BASOPHILS # BLD AUTO: 0.03 K/UL (ref 0–0.2)
BASOPHILS NFR BLD: 0.4 % (ref 0–1.9)
BILIRUB SERPL-MCNC: 0.7 MG/DL (ref 0.1–1)
BUN SERPL-MCNC: 10 MG/DL (ref 6–20)
CALCIUM SERPL-MCNC: 9 MG/DL (ref 8.7–10.5)
CANDIDA GLABRATA DNA: NEGATIVE
CANDIDA KRUSEI DNA: NEGATIVE
CANDIDA RRNA VAG QL PROBE: NEGATIVE
CHLORIDE SERPL-SCNC: 104 MMOL/L (ref 95–110)
CO2 SERPL-SCNC: 20 MMOL/L (ref 23–29)
CREAT SERPL-MCNC: 0.7 MG/DL (ref 0.5–1.4)
DIFFERENTIAL METHOD: NORMAL
EOSINOPHIL # BLD AUTO: 0.3 K/UL (ref 0–0.5)
EOSINOPHIL NFR BLD: 3.9 % (ref 0–8)
ERYTHROCYTE [DISTWIDTH] IN BLOOD BY AUTOMATED COUNT: 13.4 % (ref 11.5–14.5)
EST. GFR  (AFRICAN AMERICAN): >60 ML/MIN/1.73 M^2
EST. GFR  (NON AFRICAN AMERICAN): >60 ML/MIN/1.73 M^2
GLUCOSE SERPL-MCNC: 85 MG/DL (ref 70–110)
HCG INTACT+B SERPL-ACNC: <1.2 MIU/ML
HCT VFR BLD AUTO: 39.7 % (ref 37–48.5)
HGB BLD-MCNC: 13.1 G/DL (ref 12–16)
IMM GRANULOCYTES # BLD AUTO: 0.01 K/UL (ref 0–0.04)
IMM GRANULOCYTES NFR BLD AUTO: 0.1 % (ref 0–0.5)
LYMPHOCYTES # BLD AUTO: 4.1 K/UL (ref 1–4.8)
LYMPHOCYTES NFR BLD: 48 % (ref 18–48)
MCH RBC QN AUTO: 30.9 PG (ref 27–31)
MCHC RBC AUTO-ENTMCNC: 33 G/DL (ref 32–36)
MCV RBC AUTO: 94 FL (ref 82–98)
MONOCYTES # BLD AUTO: 0.6 K/UL (ref 0.3–1)
MONOCYTES NFR BLD: 7.3 % (ref 4–15)
NEUTROPHILS # BLD AUTO: 3.4 K/UL (ref 1.8–7.7)
NEUTROPHILS NFR BLD: 40.3 % (ref 38–73)
NRBC BLD-RTO: 0 /100 WBC
PLATELET # BLD AUTO: 327 K/UL (ref 150–350)
PMV BLD AUTO: 10.4 FL (ref 9.2–12.9)
POTASSIUM SERPL-SCNC: 3.8 MMOL/L (ref 3.5–5.1)
PROT SERPL-MCNC: 7.4 G/DL (ref 6–8.4)
RBC # BLD AUTO: 4.24 M/UL (ref 4–5.4)
SODIUM SERPL-SCNC: 136 MMOL/L (ref 136–145)
T VAGINALIS RRNA GENITAL QL PROBE: NEGATIVE
WBC # BLD AUTO: 8.46 K/UL (ref 3.9–12.7)

## 2019-12-16 PROCEDURE — 84702 CHORIONIC GONADOTROPIN TEST: CPT

## 2019-12-16 PROCEDURE — 80053 COMPREHEN METABOLIC PANEL: CPT

## 2019-12-16 PROCEDURE — 36415 COLL VENOUS BLD VENIPUNCTURE: CPT | Mod: PO

## 2019-12-16 PROCEDURE — 85025 COMPLETE CBC W/AUTO DIFF WBC: CPT

## 2019-12-17 ENCOUNTER — HOSPITAL ENCOUNTER (OUTPATIENT)
Dept: RADIOLOGY | Facility: OTHER | Age: 29
Discharge: HOME | End: 2019-12-17
Attending: FAMILY MEDICINE
Payer: COMMERCIAL

## 2019-12-17 DIAGNOSIS — R10.2 PELVIC PAIN IN FEMALE: ICD-10-CM

## 2019-12-17 DIAGNOSIS — N89.8 VAGINAL DISCHARGE: ICD-10-CM

## 2019-12-17 DIAGNOSIS — Z30.41 ORAL CONTRACEPTIVE USE: ICD-10-CM

## 2019-12-17 PROCEDURE — 76856 US PELVIS COMP WITH TRANSVAG NON-OB (XPD): ICD-10-PCS | Mod: 26,,, | Performed by: RADIOLOGY

## 2019-12-17 PROCEDURE — 76856 US EXAM PELVIC COMPLETE: CPT | Mod: 26,,, | Performed by: RADIOLOGY

## 2019-12-17 PROCEDURE — 76830 TRANSVAGINAL US NON-OB: CPT | Mod: 26,,, | Performed by: RADIOLOGY

## 2019-12-17 PROCEDURE — 76830 TRANSVAGINAL US NON-OB: CPT | Mod: TC

## 2019-12-17 PROCEDURE — 76830 US PELVIS COMP WITH TRANSVAG NON-OB (XPD): ICD-10-PCS | Mod: 26,,, | Performed by: RADIOLOGY

## 2020-01-01 ENCOUNTER — PATIENT MESSAGE (OUTPATIENT)
Dept: FAMILY MEDICINE | Facility: CLINIC | Age: 30
End: 2020-01-01

## 2020-01-02 ENCOUNTER — PATIENT MESSAGE (OUTPATIENT)
Dept: FAMILY MEDICINE | Facility: CLINIC | Age: 30
End: 2020-01-02

## 2020-07-23 ENCOUNTER — PATIENT MESSAGE (OUTPATIENT)
Dept: FAMILY MEDICINE | Facility: CLINIC | Age: 30
End: 2020-07-23

## 2020-07-26 ENCOUNTER — PATIENT MESSAGE (OUTPATIENT)
Dept: FAMILY MEDICINE | Facility: CLINIC | Age: 30
End: 2020-07-26

## 2020-07-26 RX ORDER — NORETHINDRONE ACETATE AND ETHINYL ESTRADIOL 1MG-20(21)
1 KIT ORAL DAILY
Qty: 28 TABLET | Refills: 2 | Status: SHIPPED | OUTPATIENT
Start: 2020-07-26 | End: 2020-08-25 | Stop reason: SDUPTHER

## 2020-08-10 ENCOUNTER — OFFICE VISIT (OUTPATIENT)
Dept: FAMILY MEDICINE | Facility: CLINIC | Age: 30
End: 2020-08-10
Attending: FAMILY MEDICINE
Payer: COMMERCIAL

## 2020-08-10 ENCOUNTER — PATIENT MESSAGE (OUTPATIENT)
Dept: FAMILY MEDICINE | Facility: CLINIC | Age: 30
End: 2020-08-10

## 2020-08-10 DIAGNOSIS — R10.32 LLQ PAIN: Primary | ICD-10-CM

## 2020-08-10 PROCEDURE — 99213 PR OFFICE/OUTPT VISIT, EST, LEVL III, 20-29 MIN: ICD-10-PCS | Mod: 95,,, | Performed by: FAMILY MEDICINE

## 2020-08-10 PROCEDURE — 99213 OFFICE O/P EST LOW 20 MIN: CPT | Mod: 95,,, | Performed by: FAMILY MEDICINE

## 2020-08-10 NOTE — PROGRESS NOTES
The patient location is: home  The chief complaint leading to consultation is: LLQ pain  Visit type: Virtual visit with synchronous audio and video  Total time spent with patient: 10 min  Each patient to whom he or she provides medical services by telemedicine is:  (1) informed of the relationship between the physician and patient and the respective role of any other health care provider with respect to management of the patient; and (2) notified that he or she may decline to receive medical services by telemedicine and may withdraw from such care at any time.    Subjective:       Patient ID: Jessica Garcia is a 29 y.o. female.    Chief Complaint: No chief complaint on file.    Abdominal Pain  This is a recurrent problem. The current episode started yesterday. The onset quality is sudden. The problem occurs constantly. The most recent episode lasted 1 hours. The problem has been waxing and waning. The pain is located in the LLQ. The pain is at a severity of 9/10. The pain is severe. The quality of the pain is burning and sharp. The abdominal pain does not radiate. Pertinent negatives include no constipation, diarrhea, fever, frequency, hematuria, melena, myalgias, nausea or vomiting. The pain is aggravated by movement. The pain is relieved by being still. She has tried acetaminophen (NSIADs) for the symptoms. The treatment provided moderate relief. There is no history of abdominal surgery, colon cancer, Crohn's disease, gallstones, GERD, irritable bowel syndrome, pancreatitis, PUD or ulcerative colitis. Patient's medical history includes UTI. Patient's medical history does not include kidney stones.     She has not yet started the new oral contraceptive pills prescribed last week.    Patient Active Problem List   Diagnosis    Thyroid nodule    Vitamin D deficiency    Disc displacement, lumbar    [place] cardiac risk <10% in next 10 years    Oral contraceptive use       Current Outpatient Medications:      albuterol (PROVENTIL/VENTOLIN HFA) 90 mcg/actuation inhaler, Inhale 1-2 puffs into the lungs every 6 (six) hours as needed for Wheezing. Rescue, Disp: 1 Inhaler, Rfl: 0    hydrocortisone 2.5 % cream, Apply topically 2 (two) times daily., Disp: 28 g, Rfl: 1    hydrOXYzine HCl (ATARAX) 25 MG tablet, Take 1-2 tabs PO qhs prn itching. Do not drive or operate machinery while taking this medicine., Disp: 60 tablet, Rfl: 0    ibuprofen (ADVIL,MOTRIN) 800 MG tablet, Take 800 mg by mouth as needed for Pain., Disp: , Rfl:     metoclopramide HCl (REGLAN) 10 MG tablet, Take 1 tablet (10 mg total) by mouth every 6 (six) hours as needed., Disp: 30 tablet, Rfl: 0    norethindrone-ethinyl estradiol (JUNEL FE 1/20) 1 mg-20 mcg (21)/75 mg (7) per tablet, Take 1 tablet by mouth once daily., Disp: 28 tablet, Rfl: 2    polyethylene glycol (GLYCOLAX) 17 gram/dose powder, Take 17 g by mouth once daily., Disp: 235 g, Rfl: 0    triamcinolone acetonide 0.1% (KENALOG) 0.1 % cream, Apply to affected areas of body BID prn rash. Do not use on face, underarms, or groin., Disp: 80 g, Rfl: 3    The following portions of the patient's history were reviewed and updated as appropriate: allergies, past family history, past medical history, past social history and past surgical history.    Review of Systems   Constitutional: Negative for fever.   Gastrointestinal: Positive for abdominal pain. Negative for constipation, diarrhea, melena, nausea and vomiting.   Genitourinary: Negative for frequency and hematuria.   Musculoskeletal: Negative for myalgias.       Objective:      There were no vitals taken for this visit.    Physical Exam  Constitutional:       General: She is not in acute distress.     Appearance: She is well-developed.   Neurological:      Mental Status: She is alert and oriented to person, place, and time.   Psychiatric:         Behavior: Behavior normal.         Assessment:       1. LLQ pain        Plan:       Reviewed and pelvic  "ultrasound from 12/2019 with patient.  Will obtain CT scan.    "This note will not be shared with the patient."      "

## 2020-08-14 ENCOUNTER — HOSPITAL ENCOUNTER (OUTPATIENT)
Dept: RADIOLOGY | Facility: HOSPITAL | Age: 30
Discharge: HOME | End: 2020-08-14
Attending: FAMILY MEDICINE
Payer: COMMERCIAL

## 2020-08-14 ENCOUNTER — TELEPHONE (OUTPATIENT)
Dept: FAMILY MEDICINE | Facility: CLINIC | Age: 30
End: 2020-08-14

## 2020-08-14 DIAGNOSIS — R10.32 LLQ PAIN: ICD-10-CM

## 2020-08-14 PROCEDURE — 74177 CT ABD & PELVIS W/CONTRAST: CPT | Mod: 26,,, | Performed by: RADIOLOGY

## 2020-08-14 PROCEDURE — 25500020 PHARM REV CODE 255: Performed by: FAMILY MEDICINE

## 2020-08-14 PROCEDURE — 74177 CT ABD & PELVIS W/CONTRAST: CPT | Mod: TC

## 2020-08-14 PROCEDURE — 74177 CT ABDOMEN PELVIS WITH CONTRAST: ICD-10-PCS | Mod: 26,,, | Performed by: RADIOLOGY

## 2020-08-14 RX ADMIN — IOHEXOL 75 ML: 350 INJECTION, SOLUTION INTRAVENOUS at 11:08

## 2020-08-14 RX ADMIN — IOHEXOL 15 ML: 350 INJECTION, SOLUTION INTRAVENOUS at 10:08

## 2020-08-14 NOTE — TELEPHONE ENCOUNTER
Discussed CT results with patient; agrees to additional imaging for further evaluation of liver masses.

## 2020-08-18 ENCOUNTER — PATIENT MESSAGE (OUTPATIENT)
Dept: FAMILY MEDICINE | Facility: CLINIC | Age: 30
End: 2020-08-18

## 2020-08-18 DIAGNOSIS — R16.0 LIVER MASS: ICD-10-CM

## 2020-08-18 DIAGNOSIS — R93.5 ABNORMAL CT OF THE ABDOMEN: Primary | ICD-10-CM

## 2020-08-18 DIAGNOSIS — K76.9 LIVER DISEASE, UNSPECIFIED: ICD-10-CM

## 2020-08-21 ENCOUNTER — TELEPHONE (OUTPATIENT)
Dept: FAMILY MEDICINE | Facility: CLINIC | Age: 30
End: 2020-08-21

## 2020-08-21 NOTE — TELEPHONE ENCOUNTER
Spoke to Ms. Radha and rec urgent care for vertigo.  Patient states that it's not as bad but been having this since Monday.  Patient confirmed appt date and time with Dr. Cobian on Monday,  Aug. 24th.  Instructed to go to the ED if symptoms worsens.  Patient states understanding with no further questions.

## 2020-08-21 NOTE — TELEPHONE ENCOUNTER
New onset dizziness would recommend urgent appt today with open provider in system. Contrast unlikely culprit

## 2020-08-21 NOTE — TELEPHONE ENCOUNTER
Would you like her to come in for an appt with you or Saida on Monday?  Saida is booked today. thanks

## 2020-08-21 NOTE — TELEPHONE ENCOUNTER
----- Message from Brittani Salas sent at 8/21/2020 11:22 AM CDT -----  Regarding: Vertigo  Pt. Says she is concern because she's been having vertigo since Sunday. She's asking is it the radiation from the CT Scan? Please call to advise.

## 2020-08-22 ENCOUNTER — PATIENT MESSAGE (OUTPATIENT)
Dept: FAMILY MEDICINE | Facility: CLINIC | Age: 30
End: 2020-08-22

## 2020-08-22 ENCOUNTER — HOSPITAL ENCOUNTER (OUTPATIENT)
Dept: RADIOLOGY | Facility: HOSPITAL | Age: 30
Discharge: HOME | End: 2020-08-22
Attending: FAMILY MEDICINE
Payer: COMMERCIAL

## 2020-08-22 DIAGNOSIS — R16.0 LIVER MASS: ICD-10-CM

## 2020-08-22 DIAGNOSIS — K76.9 LIVER DISEASE, UNSPECIFIED: ICD-10-CM

## 2020-08-22 DIAGNOSIS — R93.5 ABNORMAL CT OF THE ABDOMEN: ICD-10-CM

## 2020-08-22 PROBLEM — K76.89 FOCAL NODULAR HYPERPLASIA OF LIVER: Status: ACTIVE | Noted: 2020-08-22

## 2020-08-22 PROCEDURE — 74183 MRI ABD W/O CNTR FLWD CNTR: CPT | Mod: TC

## 2020-08-22 PROCEDURE — 74183 MRI ABDOMEN W WO CONTRAST: ICD-10-PCS | Mod: 26,,, | Performed by: RADIOLOGY

## 2020-08-22 PROCEDURE — 25500020 PHARM REV CODE 255: Performed by: FAMILY MEDICINE

## 2020-08-22 PROCEDURE — A9585 GADOBUTROL INJECTION: HCPCS | Performed by: FAMILY MEDICINE

## 2020-08-22 PROCEDURE — 74183 MRI ABD W/O CNTR FLWD CNTR: CPT | Mod: 26,,, | Performed by: RADIOLOGY

## 2020-08-22 RX ORDER — GADOBUTROL 604.72 MG/ML
10 INJECTION INTRAVENOUS
Status: COMPLETED | OUTPATIENT
Start: 2020-08-22 | End: 2020-08-22

## 2020-08-22 RX ADMIN — GADOBUTROL 10 ML: 604.72 INJECTION INTRAVENOUS at 12:08

## 2020-08-25 ENCOUNTER — PATIENT MESSAGE (OUTPATIENT)
Dept: FAMILY MEDICINE | Facility: CLINIC | Age: 30
End: 2020-08-25

## 2020-08-25 RX ORDER — NORETHINDRONE ACETATE AND ETHINYL ESTRADIOL 1MG-20(21)
1 KIT ORAL DAILY
Qty: 28 TABLET | Refills: 2 | Status: SHIPPED | OUTPATIENT
Start: 2020-08-25 | End: 2020-08-26

## 2020-08-26 ENCOUNTER — PATIENT MESSAGE (OUTPATIENT)
Dept: FAMILY MEDICINE | Facility: CLINIC | Age: 30
End: 2020-08-26

## 2020-08-26 RX ORDER — NORETHINDRONE ACETATE AND ETHINYL ESTRADIOL 1MG-20(21)
1 KIT ORAL DAILY
Qty: 28 TABLET | Refills: 2 | Status: SHIPPED | OUTPATIENT
Start: 2020-08-26 | End: 2020-10-01

## 2020-10-01 ENCOUNTER — PATIENT MESSAGE (OUTPATIENT)
Dept: FAMILY MEDICINE | Facility: CLINIC | Age: 30
End: 2020-10-01

## 2020-10-01 RX ORDER — NORETHINDRONE ACETATE AND ETHINYL ESTRADIOL, ETHINYL ESTRADIOL AND FERROUS FUMARATE 1MG-10(24)
KIT ORAL
Qty: 28 TABLET | Refills: 7 | Status: SHIPPED | OUTPATIENT
Start: 2020-10-01 | End: 2020-11-20

## 2020-10-20 ENCOUNTER — OFFICE VISIT (OUTPATIENT)
Dept: FAMILY MEDICINE | Facility: CLINIC | Age: 30
End: 2020-10-20
Attending: FAMILY MEDICINE
Payer: COMMERCIAL

## 2020-10-20 VITALS
HEIGHT: 65 IN | HEART RATE: 94 BPM | OXYGEN SATURATION: 95 % | SYSTOLIC BLOOD PRESSURE: 110 MMHG | DIASTOLIC BLOOD PRESSURE: 70 MMHG | BODY MASS INDEX: 29.64 KG/M2

## 2020-10-20 DIAGNOSIS — R51.9 UNILATERAL HEADACHE: Primary | ICD-10-CM

## 2020-10-20 PROCEDURE — 99214 PR OFFICE/OUTPT VISIT, EST, LEVL IV, 30-39 MIN: ICD-10-PCS | Mod: S$GLB,,, | Performed by: FAMILY MEDICINE

## 2020-10-20 PROCEDURE — 99999 PR PBB SHADOW E&M-EST. PATIENT-LVL III: CPT | Mod: PBBFAC,,, | Performed by: FAMILY MEDICINE

## 2020-10-20 PROCEDURE — 3008F BODY MASS INDEX DOCD: CPT | Mod: CPTII,S$GLB,, | Performed by: FAMILY MEDICINE

## 2020-10-20 PROCEDURE — 3008F PR BODY MASS INDEX (BMI) DOCUMENTED: ICD-10-PCS | Mod: CPTII,S$GLB,, | Performed by: FAMILY MEDICINE

## 2020-10-20 PROCEDURE — 99999 PR PBB SHADOW E&M-EST. PATIENT-LVL III: ICD-10-PCS | Mod: PBBFAC,,, | Performed by: FAMILY MEDICINE

## 2020-10-20 PROCEDURE — 99214 OFFICE O/P EST MOD 30 MIN: CPT | Mod: S$GLB,,, | Performed by: FAMILY MEDICINE

## 2020-10-20 RX ORDER — BUTALBITAL, ACETAMINOPHEN AND CAFFEINE 50; 325; 40 MG/1; MG/1; MG/1
1 TABLET ORAL EVERY 6 HOURS PRN
Qty: 20 TABLET | Refills: 0 | Status: SHIPPED | OUTPATIENT
Start: 2020-10-20 | End: 2021-03-16

## 2020-10-20 RX ORDER — FLUTICASONE PROPIONATE 50 MCG
2 SPRAY, SUSPENSION (ML) NASAL DAILY
Qty: 16 G | Refills: 2 | Status: SHIPPED | OUTPATIENT
Start: 2020-10-20 | End: 2021-09-21

## 2020-10-20 NOTE — PROGRESS NOTES
Subjective:       Patient ID: Jessica Garcia is a 30 y.o. female.    Chief Complaint: Headache    Headache   This is a new problem. The current episode started in the past 7 days (3 days ago). The problem occurs constantly. The problem has been waxing and waning. The pain is located in the right unilateral, temporal and retro-orbital region. The pain does not radiate. The pain quality is not similar to prior headaches. The quality of the pain is described as throbbing and boring. The pain is at a severity of 8/10. The pain is severe. Associated symptoms include dizziness, a loss of balance and photophobia. Pertinent negatives include no blurred vision, eye redness, eye watering, hearing loss, nausea, phonophobia, rhinorrhea, scalp tenderness, sinus pressure, sore throat, tinnitus or vomiting. The symptoms are aggravated by bright light and activity. She has tried acetaminophen and NSAIDs for the symptoms. The treatment provided mild relief. There is no history of cluster headaches, hypertension, migraine headaches, migraines in the family or recent head traumas.       Patient Active Problem List   Diagnosis    Thyroid nodule    Vitamin D deficiency    Disc displacement, lumbar    [place] cardiac risk <10% in next 10 years    Oral contraceptive use    Focal nodular hyperplasia of liver         Current Outpatient Medications:     hydrocortisone 2.5 % cream, Apply topically 2 (two) times daily., Disp: 28 g, Rfl: 1    ibuprofen (ADVIL,MOTRIN) 800 MG tablet, Take 800 mg by mouth as needed for Pain., Disp: , Rfl:     norethindrone-e.estradioL-iron (LO LOESTRIN FE) 1 mg-10 mcg (24)/10 mcg (2) Tab, Use daily as directed., Disp: 28 tablet, Rfl: 7    triamcinolone acetonide 0.1% (KENALOG) 0.1 % cream, Apply to affected areas of body BID prn rash. Do not use on face, underarms, or groin., Disp: 80 g, Rfl: 3    albuterol (PROVENTIL/VENTOLIN HFA) 90 mcg/actuation inhaler, Inhale 1-2 puffs into the lungs every 6  "(six) hours as needed for Wheezing. Rescue, Disp: 1 Inhaler, Rfl: 0    hydrOXYzine HCl (ATARAX) 25 MG tablet, Take 1-2 tabs PO qhs prn itching. Do not drive or operate machinery while taking this medicine., Disp: 60 tablet, Rfl: 0    metoclopramide HCl (REGLAN) 10 MG tablet, Take 1 tablet (10 mg total) by mouth every 6 (six) hours as needed., Disp: 30 tablet, Rfl: 0    polyethylene glycol (GLYCOLAX) 17 gram/dose powder, Take 17 g by mouth once daily., Disp: 235 g, Rfl: 0    The following portions of the patient's history were reviewed and updated as appropriate: allergies, past family history, past medical history, past social history and past surgical history.    Review of Systems   Constitutional: Positive for activity change.   HENT: Negative for hearing loss, rhinorrhea, sinus pressure, sore throat, tinnitus and trouble swallowing.    Eyes: Positive for photophobia. Negative for blurred vision, discharge and redness.   Respiratory: Negative for chest tightness and wheezing.    Cardiovascular: Negative for chest pain and palpitations.   Gastrointestinal: Positive for constipation. Negative for blood in stool, diarrhea, nausea and vomiting.   Genitourinary: Negative for difficulty urinating and hematuria.   Neurological: Positive for dizziness, headaches and loss of balance.   Psychiatric/Behavioral: Negative for dysphoric mood.       Objective:      /70   Pulse 94   Ht 5' 5" (1.651 m)   SpO2 95%   BMI 29.64 kg/m²     Physical Exam  Vitals signs reviewed.   Constitutional:       Appearance: She is well-developed.   HENT:      Head: Normocephalic and atraumatic.      Right Ear: Tympanic membrane, ear canal and external ear normal.      Left Ear: Tympanic membrane, ear canal and external ear normal.      Nose: Mucosal edema present. No nasal tenderness.      Right Nostril: Occlusion present.      Right Sinus: No maxillary sinus tenderness or frontal sinus tenderness.      Left Sinus: No maxillary sinus " tenderness or frontal sinus tenderness.      Mouth/Throat:      Mouth: Mucous membranes are moist.      Pharynx: No posterior oropharyngeal erythema.   Eyes:      General: Lids are normal. No scleral icterus.     Extraocular Movements: Extraocular movements intact.      Right eye: Normal extraocular motion.      Conjunctiva/sclera: Conjunctivae normal.      Right eye: Right conjunctiva is not injected.      Left eye: Left conjunctiva is not injected.      Pupils: Pupils are equal, round, and reactive to light.      Funduscopic exam:     Right eye: No papilledema.         Left eye: No papilledema.   Neck:      Musculoskeletal: Neck supple.      Thyroid: No thyromegaly.      Vascular: No carotid bruit or JVD.   Cardiovascular:      Rate and Rhythm: Normal rate and regular rhythm.      Pulses: Normal pulses.      Heart sounds: Normal heart sounds. No murmur. No friction rub. No gallop.    Pulmonary:      Effort: Pulmonary effort is normal.      Breath sounds: Normal breath sounds. No wheezing, rhonchi or rales.   Abdominal:      General: Bowel sounds are normal. There is no distension.      Palpations: Abdomen is soft. There is no hepatomegaly, splenomegaly or mass.      Tenderness: There is no abdominal tenderness.   Musculoskeletal: Normal range of motion.         General: No tenderness.   Lymphadenopathy:      Cervical: No cervical adenopathy.   Skin:     General: Skin is warm and dry.   Neurological:      Mental Status: She is alert and oriented to person, place, and time.      Cranial Nerves: No cranial nerve deficit.      Sensory: Sensation is intact. No sensory deficit.      Motor: Motor function is intact.      Coordination: Coordination is intact. Romberg sign negative. Coordination normal.      Deep Tendon Reflexes: Reflexes are normal and symmetric.   Psychiatric:         Speech: Speech normal.         Behavior: Behavior is cooperative.           Assessment:       1. Unilateral headache        Plan:      "  Migrainous symptoms, with sign of sinus inflammation.  Stress?? (planning her wedding at present).    Trial of:  Afrin HS  Flonase  Fioricet    Follow-up by phone/email in a few days.      "This note will not be shared with the patient."    "

## 2020-10-21 ENCOUNTER — PATIENT MESSAGE (OUTPATIENT)
Dept: FAMILY MEDICINE | Facility: CLINIC | Age: 30
End: 2020-10-21

## 2020-11-03 ENCOUNTER — PATIENT OUTREACH (OUTPATIENT)
Dept: ADMINISTRATIVE | Facility: OTHER | Age: 30
End: 2020-11-03

## 2020-11-03 NOTE — PROGRESS NOTES
Updates were requested from care everywhere.  Chart was reviewed for overdue Proactive [facility] Encounters (JACKIE) topics (CRS, Breast Cancer Screening, Eye exam)  Health Maintenance has been updated.  LINKS not responding.

## 2020-11-05 ENCOUNTER — OFFICE VISIT (OUTPATIENT)
Dept: NEUROLOGY | Facility: CLINIC | Age: 30
End: 2020-11-05
Payer: COMMERCIAL

## 2020-11-05 DIAGNOSIS — G44.40 ANALGESIC REBOUND HEADACHE: Primary | ICD-10-CM

## 2020-11-05 DIAGNOSIS — T39.95XA ANALGESIC REBOUND HEADACHE: Primary | ICD-10-CM

## 2020-11-05 DIAGNOSIS — H81.4 VERTIGO OF CENTRAL ORIGIN: ICD-10-CM

## 2020-11-05 PROBLEM — R51.9 NONINTRACTABLE HEADACHE: Status: ACTIVE | Noted: 2020-11-05

## 2020-11-05 PROCEDURE — 99204 OFFICE O/P NEW MOD 45 MIN: CPT | Mod: 95,,, | Performed by: PSYCHIATRY & NEUROLOGY

## 2020-11-05 PROCEDURE — 99204 PR OFFICE/OUTPT VISIT, NEW, LEVL IV, 45-59 MIN: ICD-10-PCS | Mod: 95,,, | Performed by: PSYCHIATRY & NEUROLOGY

## 2020-11-05 RX ORDER — METHYLPREDNISOLONE 4 MG/1
TABLET ORAL
Qty: 1 PACKAGE | Refills: 0 | Status: SHIPPED | OUTPATIENT
Start: 2020-11-05 | End: 2021-02-12

## 2020-11-05 NOTE — PATIENT INSTRUCTIONS
Rebound Headache     Overuse of pain medications can lead to rebound headaches.   You use pain medicines called analgesics to treat your headaches. You are now having more frequent or intense headaches (rebound headaches). They are your bodys response to too much pain medicine. Prescription pain medicines can cause these headaches. But so can over-the-counter medicines like acetaminophen or ibuprofen. A drug that contains caffeine or butalbital is most likely to cause rebound headache.  Symptoms of rebound headache include:  · Mild to moderate headache for 15 or more days each month for 3 months or more  · Headache when you wake up that continues most of the day  · Headaches getting worse over time  · Need for more and more medicine to treat headaches  Rebound headaches are most often diagnosed by your symptoms and medicine history. You may need tests to rule out other causes of your headaches. In the emergency room, you may be given a non-analgesic pain medicine to treat the pain or stop future headaches.  Home care  Treatment involves stopping use of your pain medicines. Your healthcare provider can tell you how to safely do this. You may be able to stop right away. Or you may need to take less and less over time (taper off). This will depend on the medicines you have been taking. To do this, follow the schedule that your provider gives you. If you are taking pain medicines for other types of pain at the same time, your healthcare provider may need other specialists to participate in your care.  · For the first week or so after stopping, the headaches will likely get worse. You may also have withdrawal symptoms. These often include nausea, vomiting, and trouble sleeping. You may be given a medicine to help relieve pain and withdrawal symptoms. Take this exactly as you have been told. It is vital to avoid taking daily pain medicine. If you do so, rebound headaches will continue.  · Caffeine can make rebound  headaches worse. If you have caffeinated drinks every day, slowly cut your intake.  · Keep a written log of your headaches. This can help you and your healthcare provider track your progress.  · Be patient. It can take about 2 to 6 months to stop having rebound headaches.  · Once you have broken the headache cycle, be careful not to start it again. Work with your provider to make a treatment plan for headache pain that has low risk of causing rebound headaches.  · Relaxation can help lower tension and relieve pain. Try a massage, meditation, yoga, or other relaxation techniques. Or make time for a relaxing hobby that you enjoy.  Follow-up care  Follow up with your healthcare provider, or as advised.  When to seek medical advice  Call your healthcare provider right away if any of these occur:  · Fever of 100.4°F (38°C) or higher  · Headaches that wake you from sleep  · Repeated vomiting or visual problems that don't go away  · Headache with a stiff neck, rash, confusion, weakness, numbness, seizure (convulsion), or trouble talking  · Headache that starts after a head injury or fall  · A type of headache you have never had before  · Headache that gets worse despite rest and medicine  Date Last Reviewed: 11/20/2015  © 5176-1472 [employer]. [address] [place] [address], [place] [address]. All rights reserved. This information is not intended as a substitute for professional medical care. Always follow your healthcare professional's instructions.

## 2020-11-05 NOTE — PROGRESS NOTES
[facility] Department of Neurology  Virtual Visit    [facility] NEUROLOGY  [facility], [place]    Date: 11/5/20  Patient Name: Jessica Garcia   MRN: [id number]   PCP: Issac Cobian Jr  Referring Provider: No ref. provider found    The patient location is: Home  The chief complaint leading to consultation is: Headache  Visit type: Virtual visit with synchronous audio and video  Total time spent with patient: 15 minutes  Each patient to whom he or she provides medical services by telemedicine is:  (1) informed of the relationship between the physician and patient and the respective role of any other health care provider with respect to management of the patient; and (2) notified that he or she may decline to receive medical services by telemedicine and may withdraw from such care at any time.    Notes:   Assessment:   Jessica Garcia is a 30 y.o. female Presenting for evaluation of new onset headache with significant rebound component.  Counseled against overuse of over-the-counter analgesic medications and Fioricet.  Patient counseled to use no more than 3 doses per week to avoid rebound headache.  Patient expressed understanding of this.  Provided Medrol Dosepak was patient continues to withdraw.  Additionally obtaining baseline brain imaging given atypical vertigo inconsistent with BPPV as reported by patient.    Plan:     Problem List Items Addressed This Visit        Neuro    Nonintractable headache - Primary    Relevant Medications    methylPREDNISolone (MEDROL DOSEPACK) 4 mg tablet      Other Visit Diagnoses     Vertigo of central origin        Relevant Orders    MRI Brain Without Contrast          Gurjit Suarez MD  [facility]   Department of Neurology    Patient note was created using MModal Dictation.  Any errors in syntax or even information may not have been identified and edited on initial review prior to signing this note.  Subjective:        HPI:   Ms. Jessica WILLIS  Radha is a 30 y.o. female presenting for evaluation of headaches. The patient reports a history of new onset headaches starting in July of this year. She underwent at CT scan of her abdomen in July and states that she subsequently developed intermittent vertigo. Since that time, the headaches have increased in frequency and intensity. She tried multiple OTC agents with no improvement in her headache until she started taking 800 mg ibuprofen, but noted this began to wear off over 3 hours. Her PCP started her on Fioricet, which she has been taking nearly every 6 hours up until the 24th as well as Flonase. Since stopping the medications, her headaches have gradually improved though she does note a lingering headache over her right temple.  She states that she has had persistent vertigo since her CT in August but notes that the vertigo is most prominent when she is sitting still and states that when she gets up to move or turns quickly, the vertigo resolves.    PAST MEDICAL HISTORY:  Past Medical History:   Diagnosis Date    Herniation of lumbar intervertebral disc     Hypercholesteremia     Tachycardia        PAST SURGICAL HISTORY:  No past surgical history on file.    CURRENT MEDS:  Current Outpatient Medications   Medication Sig Dispense Refill    albuterol (PROVENTIL/VENTOLIN HFA) 90 mcg/actuation inhaler Inhale 1-2 puffs into the lungs every 6 (six) hours as needed for Wheezing. Rescue 1 Inhaler 0    butalbital-acetaminophen-caffeine -40 mg (FIORICET, ESGIC) -40 mg per tablet Take 1 tablet by mouth every 6 (six) hours as needed for Headaches. 20 tablet 0    fluticasone propionate (FLONASE) 50 mcg/actuation nasal spray 2 sprays (100 mcg total) by Each Nostril route once daily. 16 g 2    hydrocortisone 2.5 % cream Apply topically 2 (two) times daily. 28 g 1    hydrOXYzine HCl (ATARAX) 25 MG tablet Take 1-2 tabs PO qhs prn itching. Do not drive or operate machinery while taking this medicine. 60  tablet 0    ibuprofen (ADVIL,MOTRIN) 800 MG tablet Take 800 mg by mouth as needed for Pain.      methylPREDNISolone (MEDROL DOSEPACK) 4 mg tablet use as directed 1 Package 0    metoclopramide HCl (REGLAN) 10 MG tablet Take 1 tablet (10 mg total) by mouth every 6 (six) hours as needed. 30 tablet 0    norethindrone-e.estradioL-iron (LO LOESTRIN FE) 1 mg-10 mcg (24)/10 mcg (2) Tab Use daily as directed. 28 tablet 7    polyethylene glycol (GLYCOLAX) 17 gram/dose powder Take 17 g by mouth once daily. 235 g 0    triamcinolone acetonide 0.1% (KENALOG) 0.1 % cream Apply to affected areas of body BID prn rash. Do not use on face, underarms, or groin. 80 g 3     No current facility-administered medications for this visit.        ALLERGIES:  Review of patient's allergies indicates:   Allergen Reactions    Strawberries [strawberry] Swelling       FAMILY HISTORY:  Family History   Problem Relation Age of Onset    Thyroid disease Mother     Hypertension Sister     Heart disease Maternal Grandfather     Hypertension Paternal Grandmother     Hyperlipidemia Sister     Melanoma Neg Hx        SOCIAL HISTORY:  Social History     Tobacco Use    Smoking status: Former Smoker     Types: Cigarettes     Start date: 2009     Quit date: 7/3/2016     Years since quittin.3    Smokeless tobacco: Never Used   Substance Use Topics    Alcohol use: Yes     Alcohol/week: 1.0 standard drinks     Types: 1 Glasses of wine per week     Frequency: Monthly or less     Drinks per session: 1 or 2     Binge frequency: Never    Drug use: No       Review of Systems:  12 system review of systems is negative except for the symptoms mentioned in HPI.      Objective:   There were no vitals filed for this visit.  General: NAD, well nourished   Eyes: no tearing, discharge, no erythema   ENT: moist mucous membranes of the oral cavity, nares patent    Neck: Supple, full range of motion  Cardiovascular: Appears well perfused  Lungs: Normal work  of breathing, normal chest wall excursions  Skin: No rash, lesions, or breakdown on exposed skin  Psychiatry: Mood and affect are appropriate   Abdomen: nondistended, non tender  Extremeties: No cyanosis, clubbing or edema visible    Neurological   MENTAL STATUS: Alert and oriented to person, place, and time. Attention and concentration within normal limits. Speech without dysarthria, able to name and repeat without difficulty. Recent and remote memory within normal limits   CRANIAL NERVES: Visual fields intact. PERRL. EOMI. Facial sensation intact. Face symmetrical. Hearing grossly intact. Full shoulder shrug bilaterally. Tongue protrudes midline   SENSORY: Sensation is intact to light touch throughout.   MOTOR: Normal bulk. Moves all extremities symmetrically.  REFLEXES: Deferred due to virtual visit  CEREBELLAR/COORDINATION/GAIT: Gait steady with normal arm swing and stride length.  Normal rapid alternating movements.

## 2020-11-10 ENCOUNTER — HOSPITAL ENCOUNTER (OUTPATIENT)
Dept: RADIOLOGY | Facility: HOSPITAL | Age: 30
Discharge: HOME | End: 2020-11-10
Attending: PSYCHIATRY & NEUROLOGY
Payer: COMMERCIAL

## 2020-11-10 DIAGNOSIS — H81.4 VERTIGO OF CENTRAL ORIGIN: ICD-10-CM

## 2020-11-10 PROCEDURE — 70551 MRI BRAIN WITHOUT CONTRAST: ICD-10-PCS | Mod: 26,,, | Performed by: RADIOLOGY

## 2020-11-10 PROCEDURE — 70551 MRI BRAIN STEM W/O DYE: CPT | Mod: TC

## 2020-11-10 PROCEDURE — 70551 MRI BRAIN STEM W/O DYE: CPT | Mod: 26,,, | Performed by: RADIOLOGY

## 2020-11-11 ENCOUNTER — TELEPHONE (OUTPATIENT)
Dept: NEUROLOGY | Facility: CLINIC | Age: 30
End: 2020-11-11

## 2020-11-11 ENCOUNTER — PATIENT MESSAGE (OUTPATIENT)
Dept: FAMILY MEDICINE | Facility: CLINIC | Age: 30
End: 2020-11-11

## 2020-11-11 DIAGNOSIS — G35 MULTIPLE SCLEROSIS: ICD-10-CM

## 2020-11-11 NOTE — TELEPHONE ENCOUNTER
Spoke with patient again, informed I understand she is anxious for results of MRI done yesterday and thanked her for patiently waiting.  Dr Suarez will be contacting her soon.  She voiced understanding.

## 2020-11-11 NOTE — TELEPHONE ENCOUNTER
----- Message from Gely Madera sent at 11/11/2020  9:47 AM CST -----  Regarding: Call back  Contact: [phone]  TEST RESULTS:   Patient would like to get test results.  Name of test (lab, mammo, etc.): MRI   Date of test: 11-10-20

## 2020-11-11 NOTE — TELEPHONE ENCOUNTER
----- Message from Adama Moran sent at 11/11/2020  2:15 PM CST -----  Contact: patient   [phone]  Patient would like to know what a spot on her brain means.    She states she need more clarification on her test results and findings  Please advise

## 2020-11-16 ENCOUNTER — HOSPITAL ENCOUNTER (OUTPATIENT)
Dept: RADIOLOGY | Facility: HOSPITAL | Age: 30
Discharge: HOME | End: 2020-11-16
Attending: PSYCHIATRY & NEUROLOGY
Payer: COMMERCIAL

## 2020-11-16 DIAGNOSIS — G35 MULTIPLE SCLEROSIS: ICD-10-CM

## 2020-11-16 PROCEDURE — 70553 MRI BRAIN STEM W/O & W/DYE: CPT | Mod: TC

## 2020-11-16 PROCEDURE — 72156 MRI NECK SPINE W/O & W/DYE: CPT | Mod: TC

## 2020-11-16 PROCEDURE — A9585 GADOBUTROL INJECTION: HCPCS | Performed by: PSYCHIATRY & NEUROLOGY

## 2020-11-16 PROCEDURE — 70553 MRI BRAIN DEMYELINATING W/ WO CONTRAST: ICD-10-PCS | Mod: 26,,, | Performed by: RADIOLOGY

## 2020-11-16 PROCEDURE — 72157 MRI CHEST SPINE W/O & W/DYE: CPT | Mod: TC

## 2020-11-16 PROCEDURE — 72156 MRI NECK SPINE W/O & W/DYE: CPT | Mod: 26,,, | Performed by: RADIOLOGY

## 2020-11-16 PROCEDURE — 72156 MRI CERVICAL SPINE DEMYELINATING W W/O CONTRAST: ICD-10-PCS | Mod: 26,,, | Performed by: RADIOLOGY

## 2020-11-16 PROCEDURE — 70553 MRI BRAIN STEM W/O & W/DYE: CPT | Mod: 26,,, | Performed by: RADIOLOGY

## 2020-11-16 PROCEDURE — 72157 MRI THORACIC SPINE DEMYELINATING W W/O CONTRAST: ICD-10-PCS | Mod: 26,,, | Performed by: RADIOLOGY

## 2020-11-16 PROCEDURE — 72157 MRI CHEST SPINE W/O & W/DYE: CPT | Mod: 26,,, | Performed by: RADIOLOGY

## 2020-11-16 PROCEDURE — 25500020 PHARM REV CODE 255: Performed by: PSYCHIATRY & NEUROLOGY

## 2020-11-16 RX ORDER — GADOBUTROL 604.72 MG/ML
9 INJECTION INTRAVENOUS
Status: COMPLETED | OUTPATIENT
Start: 2020-11-16 | End: 2020-11-16

## 2020-11-16 RX ADMIN — GADOBUTROL 9 ML: 604.72 INJECTION INTRAVENOUS at 08:11

## 2020-11-17 ENCOUNTER — LAB VISIT (OUTPATIENT)
Dept: LAB | Facility: HOSPITAL | Age: 30
End: 2020-11-17
Attending: PSYCHIATRY & NEUROLOGY
Payer: COMMERCIAL

## 2020-11-17 ENCOUNTER — TELEPHONE (OUTPATIENT)
Dept: NEUROLOGY | Facility: CLINIC | Age: 30
End: 2020-11-17

## 2020-11-17 ENCOUNTER — PATIENT MESSAGE (OUTPATIENT)
Dept: FAMILY MEDICINE | Facility: CLINIC | Age: 30
End: 2020-11-17

## 2020-11-17 ENCOUNTER — OFFICE VISIT (OUTPATIENT)
Dept: NEUROLOGY | Facility: CLINIC | Age: 30
End: 2020-11-17
Payer: COMMERCIAL

## 2020-11-17 DIAGNOSIS — G60.3 IDIOPATHIC PROGRESSIVE POLYNEUROPATHY: ICD-10-CM

## 2020-11-17 DIAGNOSIS — G60.3 IDIOPATHIC PROGRESSIVE POLYNEUROPATHY: Primary | ICD-10-CM

## 2020-11-17 DIAGNOSIS — R51.9 CHRONIC NONINTRACTABLE HEADACHE, UNSPECIFIED HEADACHE TYPE: Primary | ICD-10-CM

## 2020-11-17 DIAGNOSIS — G89.29 CHRONIC NONINTRACTABLE HEADACHE, UNSPECIFIED HEADACHE TYPE: Primary | ICD-10-CM

## 2020-11-17 LAB
25(OH)D3+25(OH)D2 SERPL-MCNC: 42 NG/ML (ref 30–96)
ALBUMIN SERPL BCP-MCNC: 3.7 G/DL (ref 3.5–5.2)
ALP SERPL-CCNC: 41 U/L (ref 55–135)
ALT SERPL W/O P-5'-P-CCNC: 29 U/L (ref 10–44)
ANION GAP SERPL CALC-SCNC: 10 MMOL/L (ref 8–16)
AST SERPL-CCNC: 23 U/L (ref 10–40)
BASOPHILS # BLD AUTO: 0.04 K/UL (ref 0–0.2)
BASOPHILS NFR BLD: 0.5 % (ref 0–1.9)
BILIRUB SERPL-MCNC: 0.5 MG/DL (ref 0.1–1)
BUN SERPL-MCNC: 10 MG/DL (ref 6–20)
CALCIUM SERPL-MCNC: 9.1 MG/DL (ref 8.7–10.5)
CHLORIDE SERPL-SCNC: 105 MMOL/L (ref 95–110)
CO2 SERPL-SCNC: 23 MMOL/L (ref 23–29)
CREAT SERPL-MCNC: 0.7 MG/DL (ref 0.5–1.4)
DIFFERENTIAL METHOD: ABNORMAL
EOSINOPHIL # BLD AUTO: 0.2 K/UL (ref 0–0.5)
EOSINOPHIL NFR BLD: 2.8 % (ref 0–8)
ERYTHROCYTE [DISTWIDTH] IN BLOOD BY AUTOMATED COUNT: 12.4 % (ref 11.5–14.5)
EST. GFR  (AFRICAN AMERICAN): >60 ML/MIN/1.73 M^2
EST. GFR  (NON AFRICAN AMERICAN): >60 ML/MIN/1.73 M^2
GLUCOSE SERPL-MCNC: 84 MG/DL (ref 70–110)
HCT VFR BLD AUTO: 42.6 % (ref 37–48.5)
HGB BLD-MCNC: 13.9 G/DL (ref 12–16)
IMM GRANULOCYTES # BLD AUTO: 0.03 K/UL (ref 0–0.04)
IMM GRANULOCYTES NFR BLD AUTO: 0.4 % (ref 0–0.5)
LYMPHOCYTES # BLD AUTO: 3.3 K/UL (ref 1–4.8)
LYMPHOCYTES NFR BLD: 40.1 % (ref 18–48)
MCH RBC QN AUTO: 31.1 PG (ref 27–31)
MCHC RBC AUTO-ENTMCNC: 32.6 G/DL (ref 32–36)
MCV RBC AUTO: 95 FL (ref 82–98)
MONOCYTES # BLD AUTO: 0.5 K/UL (ref 0.3–1)
MONOCYTES NFR BLD: 6.4 % (ref 4–15)
NEUTROPHILS # BLD AUTO: 4 K/UL (ref 1.8–7.7)
NEUTROPHILS NFR BLD: 49.8 % (ref 38–73)
NRBC BLD-RTO: 0 /100 WBC
PLATELET # BLD AUTO: 375 K/UL (ref 150–350)
PMV BLD AUTO: 10.1 FL (ref 9.2–12.9)
POTASSIUM SERPL-SCNC: 3.9 MMOL/L (ref 3.5–5.1)
PROT SERPL-MCNC: 7.9 G/DL (ref 6–8.4)
RBC # BLD AUTO: 4.47 M/UL (ref 4–5.4)
SODIUM SERPL-SCNC: 138 MMOL/L (ref 136–145)
TSH SERPL DL<=0.005 MIU/L-ACNC: 0.85 UIU/ML (ref 0.4–4)
VIT B12 SERPL-MCNC: 588 PG/ML (ref 210–950)
WBC # BLD AUTO: 8.11 K/UL (ref 3.9–12.7)

## 2020-11-17 PROCEDURE — 82306 VITAMIN D 25 HYDROXY: CPT

## 2020-11-17 PROCEDURE — 85025 COMPLETE CBC W/AUTO DIFF WBC: CPT

## 2020-11-17 PROCEDURE — 86592 SYPHILIS TEST NON-TREP QUAL: CPT

## 2020-11-17 PROCEDURE — 86703 HIV-1/HIV-2 1 RESULT ANTBDY: CPT

## 2020-11-17 PROCEDURE — 84443 ASSAY THYROID STIM HORMONE: CPT

## 2020-11-17 PROCEDURE — 99214 PR OFFICE/OUTPT VISIT, EST, LEVL IV, 30-39 MIN: ICD-10-PCS | Mod: 95,,, | Performed by: PSYCHIATRY & NEUROLOGY

## 2020-11-17 PROCEDURE — 80053 COMPREHEN METABOLIC PANEL: CPT

## 2020-11-17 PROCEDURE — 82607 VITAMIN B-12: CPT

## 2020-11-17 PROCEDURE — 86617 LYME DISEASE ANTIBODY: CPT

## 2020-11-17 PROCEDURE — 99214 OFFICE O/P EST MOD 30 MIN: CPT | Mod: 95,,, | Performed by: PSYCHIATRY & NEUROLOGY

## 2020-11-17 PROCEDURE — 36415 COLL VENOUS BLD VENIPUNCTURE: CPT | Mod: PO

## 2020-11-17 PROCEDURE — 86038 ANTINUCLEAR ANTIBODIES: CPT

## 2020-11-17 NOTE — TELEPHONE ENCOUNTER
Patient was contact to schedule with Neurology which had open appointments,she said she's waiting on a phone call from Dr. Miranda Office because she's being evaluated for MS.

## 2020-11-17 NOTE — TELEPHONE ENCOUNTER
----- Message from Georgia Faye sent at 11/17/2020  3:10 PM CST -----  Contact: Pt @ [phone]  Pt is calling to schedule as a new pt w/ the dr per Dr. Tobias. Dr. Tobias wants pt to see the dr because she believes the pt may have MS. I'm not able to schedule for this drPerez

## 2020-11-17 NOTE — TELEPHONE ENCOUNTER
----- Message from Jan Gale sent at 11/17/2020  9:46 AM CST -----  Regarding: call back  Type:  Patient Returning Call    Who Called:patient   Who Left Message for Patient:n/a  Does the patient know what this is regarding?:go over test results   Would the patient rather a call back or a response via [facility]? Call back   Best Call Back Number:[phone]  Additional Information: n/a

## 2020-11-17 NOTE — TELEPHONE ENCOUNTER
----- Message from Gurjit Suarez MD sent at 11/17/2020 10:12 AM CST -----  Any availability for a tap? MS rule out.

## 2020-11-17 NOTE — TELEPHONE ENCOUNTER
----- Message from Montse Tobias MD sent at 11/17/2020  3:02 PM CST -----  Shelton Georges,    I would like to refer this patient to your team. Brain MRI with spots in the infratentorial , periventicular and juxta cortical regions. It was done for new onset headache with vertigo. She had an episode of right-sided facial numbness a few months ago.     Thanks    Montse

## 2020-11-17 NOTE — PROGRESS NOTES
NEUROLOGY  Outpatient Follow Up    [address] [address]  [phone] (office) / [phone] ( (fax)    Patient Name:  Jessica Garcia  :  1990  MR #:  [id number]  Acct #:  [id number]    Date of Neurology Visit: 2020  Name of Neurologist: Montse Tobias MD    Other Physicians:  Issac Cobian Jr, MD (Primary Care Physician); No ref. provider found (Referring)    The patient location is:  home  The chief complaint leading to consultation is:  Vertigo and she would like to discuss MRI brain findings    Visit type: audiovisual    Face to Face time with patient: 30 minutes  30  minutes of total time spent on the encounter, which includes face to face time and non-face to face time preparing to see the patient (eg, review of tests), Obtaining and/or reviewing separately obtained history, Documenting clinical information in the electronic or other health record, Independently interpreting results (not separately reported) and communicating results to the patient/family/caregiver, or Care coordination (not separately reported).         Each patient to whom he or she provides medical services by telemedicine is:  (1) informed of the relationship between the physician and patient and the respective role of any other health care provider with respect to management of the patient; and (2) notified that he or she may decline to receive medical services by telemedicine and may withdraw from such care at any time.    Notes:       Chief Complaint:  Headaches-currently resolved, vertigo    History of Present Illness (HPI):  Jessica Garcia is a 30 y.o. female presents to discuss her imaging findings via virtual visit.  She was previously seen by Dr. Gurjit Suarez.     She was having headaches and was seen by her PCP who started her on Butabital. She had no relief. She had vertigo when sitting in her chair.  Given her persistent headache she saw Dr. Gurjit Suarez who  obtain brain imaging and this revealed multiple brain lesions possibly suggesting of an underlying demyelinating disease.  The headaches have now resolved.  Currently she only experiences vertigo.    Vertigo- she experiences this currently, feels like being off balance - not one side specifically. This came on after she got a CT scan in August 2020 and had a reaction to the iodine she has been feeling off balance.  This resolved but returned about 2 weeks ago.  In August, the symptoms lasted for 2 weeks  No bowel or bladder issues.     Sensory symptoms: Left hand got tingling, numbness x 2 minutes last summer  Tingling during MRI yesterday involing right hand for a few minutes.  Right cheek numbness lasting a few seconds a few months ago    No nausea.   Chronic constipation.  Mild chronic fatigue which she attributes to fibromyalgia    No vision changes     Motor symptoms: none     She has Vitamin D deficiency but levels have not been checked recently. She has a known thyroid cyst.    She has chronic fatigue which is unchanged      She is a licensed counselor. She does not have children, no plans to have children.   No family history of neurological issues       Duration:  2 weeks  Location:  Vertigo  Intensity:  Mild to moderate  Aggravating factors:  None  Relieving factors:  None  Frequency:  Several times  Timing:  During the day  Associated symptoms:  She had headaches previously which have now resolved        Treatment to date:    None       Review of Systems:    General: Weight gain: No, Weight Loss: No, Fatigue: Yes,   Respiratory:  Cough: No, Shortness of Breath: No  Endocrine: Heat Intolerance: No, Cold Intolerance: No,  Eyes:  Blurred Vision: No, Double Vision: No  Neurological: Difficulty Walking/Falls: No, Headache Migraine: Yes, Dizziness/Vertigo: Yes  Cardiovascular: Chest Pain: No, Shortness of Breath: No,   Gastrointestinal: Nausea/Vomiting: No, Constipation: yes  : Frequent Urination: No,  Incontinence: No    The remainder of the review of twelve body systems was reviewed and normal.    Past Medical, Surgical, Family & Social History:   Past Medical History:   Diagnosis Date    Herniation of lumbar intervertebral disc     Hypercholesteremia     Tachycardia        Home Medications:     Current Outpatient Medications:     albuterol (PROVENTIL/VENTOLIN HFA) 90 mcg/actuation inhaler, Inhale 1-2 puffs into the lungs every 6 (six) hours as needed for Wheezing. Rescue, Disp: 1 Inhaler, Rfl: 0    butalbital-acetaminophen-caffeine -40 mg (FIORICET, ESGIC) -40 mg per tablet, Take 1 tablet by mouth every 6 (six) hours as needed for Headaches., Disp: 20 tablet, Rfl: 0    fluticasone propionate (FLONASE) 50 mcg/actuation nasal spray, 2 sprays (100 mcg total) by Each Nostril route once daily., Disp: 16 g, Rfl: 2    hydrocortisone 2.5 % cream, Apply topically 2 (two) times daily., Disp: 28 g, Rfl: 1    hydrOXYzine HCl (ATARAX) 25 MG tablet, Take 1-2 tabs PO qhs prn itching. Do not drive or operate machinery while taking this medicine., Disp: 60 tablet, Rfl: 0    ibuprofen (ADVIL,MOTRIN) 800 MG tablet, Take 800 mg by mouth as needed for Pain., Disp: , Rfl:     methylPREDNISolone (MEDROL DOSEPACK) 4 mg tablet, use as directed, Disp: 1 Package, Rfl: 0    metoclopramide HCl (REGLAN) 10 MG tablet, Take 1 tablet (10 mg total) by mouth every 6 (six) hours as needed., Disp: 30 tablet, Rfl: 0    norethindrone-e.estradioL-iron (LO LOESTRIN FE) 1 mg-10 mcg (24)/10 mcg (2) Tab, Use daily as directed., Disp: 28 tablet, Rfl: 7    polyethylene glycol (GLYCOLAX) 17 gram/dose powder, Take 17 g by mouth once daily., Disp: 235 g, Rfl: 0    triamcinolone acetonide 0.1% (KENALOG) 0.1 % cream, Apply to affected areas of body BID prn rash. Do not use on face, underarms, or groin., Disp: 80 g, Rfl: 3  No current facility-administered medications for this visit.     Physical Examination:  There were no vitals taken  for this visit.    GENERAL:  General appearance: Well, non-toxic appearing.  No apparent distress.  Fundi exam: normal.  Neck: supple.  Carotid auscultation: normal.  Heart auscultation: normal.  Peripheral pulses: normal.  Extremities: normal.    MENTAL STATUS:  Alertness, attention span & concentration: normal.  Language: normal.  Orientation to self, place & time:  normal.  Memory, recent & remote: normal.  Fund of knowledge: normal.      SPEECH:  Clear and fluent.  Follows complex commands.    CRANIAL NERVES:  Cranial Nerves II-XII were examined.  II - Visual fields: normal.  III, IV, VI: PERRL, EOMI, No ptosis, No nystagmus.  V - Facial sensation: normal.  VII - Face symmetry & mobility: normal.  VIII - Hearing: normal.  IX, X - Palate: mobile & midline.  XI - Shoulder shrug: normal.  XII - Tongue protrusion: normal.    GROSS MOTOR:  Gait & station: normal.  Tone: normal.  Abnormal movements: none.  Finger-nose & Heel-knee-shin: normal.  Rapid alternating movements & drift: normal.  Romberg: absent    MUSCLE STRENGTH:   Patient moves bilateral upper and lower extremities strongly and antigravity  Unable to perform graded exam during video visit    REFLEXES:    Unable to perform reflex testing during video visit      SENSORY:  Light touch: Normal throughout.      Diagnostic Data Reviewed:     MRI brain, cervical and thoracic spine with and without contrast on 11/16/2020:  Impression:     Brain appears stable from recent examination again demonstrating several scattered T2/FLAIR hyperintense lesions in the supratentorial and infratentorial white matter.  Distribution nonspecific, but remains suspicious for demyelinating disease.  Alternate less common differential considerations to include Lyme disease, prior vascular insult, or other inflammatory process.  No enhancing foci on postcontrast imaging to specifically indicate active demyelination.  Clinical correlation required.     Spinal cord appears within normal  limits.  No focal lesions or enhancement to suggest recent or remote demyelination.     1.7 cm T2 hyperintense nodule in the right lobe of the thyroid gland.  Recommend thyroid ultrasound for further characterization.      MRI brain without contrast on 11/10/2020  Impression:     Several small scattered T2/FLAIR hyperintense lesions in the supratentorial and infratentorial white matter as above.  Although not entirely specific, the distribution is suspicious for demyelinating disease.  Clinical correlation is required.  Recommend postcontrast imaging to assess for possible active demyelinating plaque.     This report was flagged in Epic as abnormal.        Assessment and Plan:    Jessica Garcia is a 30 y.o. female presents to discuss her imaging findings via virtual visit.  She was previously seen by Dr. Gurjit Suarez.     The patient states that she developed headaches for which she saw Dr. Gurjit Suarez and these headaches have currently resolved.  She additionally has been experiencing dizziness x2 weeks.  She describes the dizziness as episodes of feeling off balance either when she is sitting or walking.  Her neurological examination is limited since it is on video but is within normal limits.    MRI brain with and without contrast reviewed and noted with lesions in the periventricular region, right kya and left MCP.  There was no enhancement noted.  The patient had an episode of right cheek numbness 2 months ago which lasted a few minutes.  This could potentially be related to the right pontine lesion.  I am not sure if I would attribute her current symptoms to any of the lesions but potentially the left MCP lesion could cause a sense of feeling off balance.  Since she has periventricular and infratentorial lesions, she meets criteria for dissemination in space but currently, she does not meet criteria for dissemination in time as I am not sure if the current symptoms could be attributed to her MRI brain  findings.    I spoke with her about the benefits of doing the lumbar puncture, it would help us evaluate for oligoclonal bands, IgG index, Lyme disease, cell count, protein and glucose.  I recommended that she follow up with 1 of the MS specialists to have this discussion and discuss next steps.  I would like to do blood work to rule out other causes today.  I also told her that another option would be to obtain an MRI brain in 6 months if she is against getting a lumbar puncture and monitoring for progression.    Assessment:  1.  ?CNS demyelinating disease    Recommendations:  1.  Follow-up with the MS team for next steps regarding lumbar puncture and treatment options.     Important to note, also  has a past medical history of Herniation of lumbar intervertebral disc, Hypercholesteremia, and Tachycardia.    Time Spent:  30  minutes spent face-to-face, >50% spent advising about: counseling and/or coordination of care    This note was created with voice recognition software.  Grammatical, syntax and spelling errors may be inevitable.           Montse Tobias MD  Medicine- Neurology, Clinical Neurophysiology

## 2020-11-18 ENCOUNTER — PATIENT MESSAGE (OUTPATIENT)
Dept: NEUROLOGY | Facility: CLINIC | Age: 30
End: 2020-11-18

## 2020-11-18 ENCOUNTER — TELEPHONE (OUTPATIENT)
Dept: NEUROLOGY | Facility: CLINIC | Age: 30
End: 2020-11-18

## 2020-11-18 LAB
ANA SER QL IF: NORMAL
HIV 1+2 AB+HIV1 P24 AG SERPL QL IA: NEGATIVE
RPR SER QL: NORMAL

## 2020-11-18 NOTE — TELEPHONE ENCOUNTER
----- Message from Na Stroud sent at 11/18/2020  8:57 AM CST -----  Regarding: appt  Contact: pt @ [phone]  Pt calling to schedule an appt with Dr. Miranda, patient was scheduled 3/2/21, patient is asking to be seen sooner. Please call.

## 2020-11-19 NOTE — TELEPHONE ENCOUNTER
I will reach out to patient once BB responds. She wanted to know when the fist available MD appt was, I just added that she was scheduled with you as an FYI for BB.

## 2020-11-20 ENCOUNTER — PATIENT MESSAGE (OUTPATIENT)
Dept: FAMILY MEDICINE | Facility: CLINIC | Age: 30
End: 2020-11-20

## 2020-11-20 RX ORDER — MECLIZINE HCL 12.5 MG 12.5 MG/1
25 TABLET ORAL 3 TIMES DAILY PRN
Qty: 60 TABLET | Refills: 1 | Status: SHIPPED | OUTPATIENT
Start: 2020-11-20 | End: 2021-09-21

## 2020-11-20 RX ORDER — NORETHINDRONE ACETATE AND ETHINYL ESTRADIOL AND FERROUS FUMARATE 1MG-20(21)
KIT ORAL
COMMUNITY
Start: 2020-10-26 | End: 2020-12-03 | Stop reason: ALTCHOICE

## 2020-11-23 ENCOUNTER — PATIENT MESSAGE (OUTPATIENT)
Dept: FAMILY MEDICINE | Facility: CLINIC | Age: 30
End: 2020-11-23

## 2020-11-23 LAB
B BURGDOR IGG SER QL IB: NEGATIVE
B BURGDOR IGM SER QL IB: NEGATIVE

## 2020-12-01 ENCOUNTER — CLINICAL SUPPORT (OUTPATIENT)
Dept: OTOLARYNGOLOGY | Facility: CLINIC | Age: 30
End: 2020-12-01
Payer: COMMERCIAL

## 2020-12-01 ENCOUNTER — OFFICE VISIT (OUTPATIENT)
Dept: OTOLARYNGOLOGY | Facility: CLINIC | Age: 30
End: 2020-12-01
Payer: COMMERCIAL

## 2020-12-01 VITALS
HEART RATE: 88 BPM | BODY MASS INDEX: 28.78 KG/M2 | WEIGHT: 172.94 LBS | SYSTOLIC BLOOD PRESSURE: 111 MMHG | DIASTOLIC BLOOD PRESSURE: 70 MMHG

## 2020-12-01 DIAGNOSIS — R42 VERTIGO: ICD-10-CM

## 2020-12-01 DIAGNOSIS — R42 DIZZINESS: Primary | ICD-10-CM

## 2020-12-01 DIAGNOSIS — R51.9 NONINTRACTABLE HEADACHE, UNSPECIFIED CHRONICITY PATTERN, UNSPECIFIED HEADACHE TYPE: ICD-10-CM

## 2020-12-01 DIAGNOSIS — R42 VERTIGO: Primary | ICD-10-CM

## 2020-12-01 PROCEDURE — 99999 PR PBB SHADOW E&M-EST. PATIENT-LVL I: CPT | Mod: PBBFAC,,, | Performed by: AUDIOLOGIST-HEARING AID FITTER

## 2020-12-01 PROCEDURE — 99999 PR PBB SHADOW E&M-EST. PATIENT-LVL I: ICD-10-PCS | Mod: PBBFAC,,, | Performed by: AUDIOLOGIST-HEARING AID FITTER

## 2020-12-01 PROCEDURE — 3008F BODY MASS INDEX DOCD: CPT | Mod: CPTII,S$GLB,, | Performed by: OTOLARYNGOLOGY

## 2020-12-01 PROCEDURE — 1126F PR PAIN SEVERITY QUANTIFIED, NO PAIN PRESENT: ICD-10-PCS | Mod: S$GLB,,, | Performed by: OTOLARYNGOLOGY

## 2020-12-01 PROCEDURE — 99203 OFFICE O/P NEW LOW 30 MIN: CPT | Mod: S$GLB,,, | Performed by: OTOLARYNGOLOGY

## 2020-12-01 PROCEDURE — 3008F PR BODY MASS INDEX (BMI) DOCUMENTED: ICD-10-PCS | Mod: CPTII,S$GLB,, | Performed by: OTOLARYNGOLOGY

## 2020-12-01 PROCEDURE — 92552 PR PURE TONE AUDIOMETRY, AIR: ICD-10-PCS | Mod: S$GLB,,, | Performed by: AUDIOLOGIST-HEARING AID FITTER

## 2020-12-01 PROCEDURE — 99999 PR PBB SHADOW E&M-EST. PATIENT-LVL IV: ICD-10-PCS | Mod: PBBFAC,,, | Performed by: OTOLARYNGOLOGY

## 2020-12-01 PROCEDURE — 1126F AMNT PAIN NOTED NONE PRSNT: CPT | Mod: S$GLB,,, | Performed by: OTOLARYNGOLOGY

## 2020-12-01 PROCEDURE — 99999 PR PBB SHADOW E&M-EST. PATIENT-LVL IV: CPT | Mod: PBBFAC,,, | Performed by: OTOLARYNGOLOGY

## 2020-12-01 PROCEDURE — 92567 PR TYMPA2METRY: ICD-10-PCS | Mod: S$GLB,,, | Performed by: AUDIOLOGIST-HEARING AID FITTER

## 2020-12-01 PROCEDURE — 92552 PURE TONE AUDIOMETRY AIR: CPT | Mod: S$GLB,,, | Performed by: AUDIOLOGIST-HEARING AID FITTER

## 2020-12-01 PROCEDURE — 99203 PR OFFICE/OUTPT VISIT, NEW, LEVL III, 30-44 MIN: ICD-10-PCS | Mod: S$GLB,,, | Performed by: OTOLARYNGOLOGY

## 2020-12-01 PROCEDURE — 92567 TYMPANOMETRY: CPT | Mod: S$GLB,,, | Performed by: AUDIOLOGIST-HEARING AID FITTER

## 2020-12-01 NOTE — PROGRESS NOTES
Chief Complaint   Patient presents with    Dizziness     pt states dizziness is constant, feels off balance. started a month ago   .     HPI: Mrs. Garcia presents for evaluation of dizziness. The symptoms started about a month ago and have been constant. She describes it as a feeling of constant She notes associated off balance feeling. She denies any changes in her hearing. She denies tinnitus.  She does not know of any precipitating factors. Positions that worsen symptoms: none.  Associated ear symptoms: none. Associated CNS symptoms: headaches. Recent infections: none. Head trauma: denied. Drug ingestion prior to onset: none. Noise exposure: no occupational exposure.  She was prescribed Fioricet for the headaches without relief. She has also seen neurologist Dr. Gurjit Suarez and has had extensive work-up(results reviewed below)with MRI brain demyelinating protocol with suspicious lesions. She has follow up with neurologist specializing in MS later this week.        Past Medical History:   Diagnosis Date    Herniation of lumbar intervertebral disc     Hypercholesteremia     Tachycardia      Social History     Socioeconomic History    Marital status: Single     Spouse name: Not on file    Number of children: Not on file    Years of education: Not on file    Highest education level: Not on file   Occupational History    Occupation: counselor   Social Needs    Financial resource strain: Not hard at all    Food insecurity     Worry: Never true     Inability: Never true    Transportation needs     Medical: No     Non-medical: No   Tobacco Use    Smoking status: Former Smoker     Types: Cigarettes     Start date: 2009     Quit date: 7/3/2016     Years since quittin.4    Smokeless tobacco: Never Used   Substance and Sexual Activity    Alcohol use: Yes     Alcohol/week: 1.0 standard drinks     Types: 1 Glasses of wine per week     Frequency: Monthly or less     Drinks per session: 1 or 2      Binge frequency: Never    Drug use: No    Sexual activity: Yes     Partners: Male     Birth control/protection: OCP   Lifestyle    Physical activity     Days per week: 7 days     Minutes per session: 60 min    Stress: Only a little   Relationships    Social connections     Talks on phone: More than three times a week     Gets together: Three times a week     Attends Holiness service: Not on file     Active member of club or organization: No     Attends meetings of clubs or organizations: Never     Relationship status: Living with partner   Other Topics Concern    Are you pregnant or think you may be? No    Breast-feeding No   Social History Narrative    The patient does not exercise regularly ().      Rates diet as fair.      She is not satisfied with weight.    She does not drink at least 1/2 gallon water daily.    She drinks 1 coffee/tea/caffeine-containing soft drinks daily.    Total sleep time at night is 7-8 hours.    She works 40 hours per week.    She does wear seat belts.    Hobbies include reading, movies.         History reviewed. No pertinent surgical history.  Family History   Problem Relation Age of Onset    Thyroid disease Mother     Hypertension Sister     Heart disease Maternal Grandfather     Hypertension Paternal Grandmother     Hyperlipidemia Sister     Melanoma Neg Hx            Review of Systems  General: negative for chills, fever or weight loss  Psychological: negative for mood changes or depression  Ophthalmic: negative for blurry vision, photophobia or eye pain  ENT: see HPI  Respiratory: no cough, shortness of breath, or wheezing  Cardiovascular: no chest pain or dyspnea on exertion  Gastrointestinal: no abdominal pain, change in bowel habits, or black/ bloody stools  Musculoskeletal: negative for gait disturbance or muscular weakness  Neurological: no syncope or seizures; no ataxia  Dermatological: negative for puritis,  rash and jaundice  Hematologic/lymphatic: no easy  bruising, no new lumps or bumps      Physical Exam:    Vitals:    12/01/20 1328   BP: 111/70   Pulse: 88       Constitutional: Well appearing / communicating without difficutly.  NAD.  Eyes: EOM I Bilaterally  Head/Face: Normocephalic.  Negative paranasal sinus pressure/tenderness.  Salivary glands WNL.  House Brackmann I Bilaterally.    Right Ear: Auricle normal appearance. External Auditory Canal within normal limits no lesions or masses,TM w/o masses/lesions/perforations. TM mobility noted.   Left Ear: Auricle normal appearance. External Auditory Canal within normal limits no lesions or masses,TM w/o masses/lesions/perforations. TM mobility noted.  Vestibular exam: negativehead thrust; negative Fakuda step test; negative Romberg; negative Right Dixx- Hallpike; negative left Dixx- Hallpike  Nose: No gross nasal septal deviation. Inferior Turbinates 3+ bilaterally. No septal perforation. No masses/lesions. External nasal skin appears normal without masses/lesions.  Oral Cavity: Gingiva/lips within normal limits.  Dentition/gingiva healthy appearing. Mucus membranes moist. Floor of mouth soft, no masses palpated. Oral Tongue mobile. Hard Palate appears normal.    Oropharynx: Base of tongue appears normal. No masses/lesions noted. Tonsillar fossa/pharyngeal wall without lesions. Posterior oropharynx WNL.  Soft palate without masses. Midline uvula.   Neck/Lymphatic: No LAD I-VI bilaterally.  No thyromegaly.  No masses noted on exam.    Mirror laryngoscopy/nasopharyngoscopy: Active gag reflex.  Unable to perform.    Neuro/Psychiatric: AOx3.  Normal mood and affect.   Cardiovascular: Normal carotid pulses bilaterally, no increasing jugular venous distention noted at cervical region bilaterally.    Respiratory: Normal respiratory effort, no stridor, no retractions noted.        Diagnostic studies reviewed:   Audiogram reviewed personally by myself and in detail with the patient today.       MRI brain without contrast  11/11/2020: Impression:     Several small scattered T2/FLAIR hyperintense lesions in the supratentorial and infratentorial white matter as above.  Although not entirely specific, the distribution is suspicious for demyelinating disease.  Clinical correlation is required.  Recommend postcontrast imaging to assess for possible active demyelinating plaque.    MRI brain demyelinating protocol 11/16/2020: Impression: Brain appears stable from recent examination again demonstrating several scattered T2/FLAIR hyperintense lesions in the supratentorial and infratentorial white matter.  Distribution nonspecific, but remains suspicious for demyelinating disease.  Alternate less common differential considerations to include Lyme disease, prior vascular insult, or other inflammatory process.  No enhancing foci on postcontrast imaging to specifically indicate active demyelination.  Clinical correlation required.     Spinal cord appears within normal limits.  No focal lesions or enhancement to suggest recent or remote demyelination.     1.7 cm T2 hyperintense nodule in the right lobe of the thyroid gland.  Recommend thyroid ultrasound for further characterization.        Assessment:    ICD-10-CM ICD-9-CM    1. Vertigo  R42 780.4 Basic vestibular evaluation   2. Nonintractable headache, unspecified chronicity pattern, unspecified headache type  R51.9 784.0      The primary encounter diagnosis was Vertigo. A diagnosis of Nonintractable headache, unspecified chronicity pattern, unspecified headache type was also pertinent to this visit.      Plan:  Orders Placed This Encounter   Procedures    Basic vestibular evaluation         I had a long discussion with the patient regarding their symptoms.  Dizziness, vertigo and disequilibrium are common symptoms reported by adults during visits to their doctors. They are all symptoms that can result from a peripheral vestibular disorder (a dysfunction of the balance organs of the inner ear)  or central vestibular disorder (a dysfunction of one or more parts of the central nervous system that help process balance and spatial information).  There are also non-vestibular causes of dizziness, and dizziness can be linked to a wide array of problems such as blood-flow irregularities from cardiovascular problems and blood pressure fluctuations.In her case, central vertigo is certainly a concern given her recent MRI findings.   I would recommend a VNG for further diagnostic testing, and will contact the patient with further recommendations when that is complete.     Gemma Ferguson MD

## 2020-12-01 NOTE — PATIENT INSTRUCTIONS
[facility] [address], [address]  [place] [address]  [phone]    Your physician may determine a need for one or more tests of your balance system at the time of your visit. One test is called a videonystagmogram (VNG), the other is called Posturography.  We ask that you prepare in the event that either of these tests are ordered.  The tests are simple and painless and take about 90 minutes (combined).  Please dress comfortably.  Certain medications will affect the results of theses tests.  In order to prevent this from happening, it will be necessary for you to follow these instructions:    1. DO NOT  take any of the following medications for at least 24 hours prior to the test:  a. Sleeping pills (Seconal, Donnatol, Dalmane, etc.)  b. Tranquilizers (Librium, Valium, Equanil, etc.)  c. Anti-histamines or over-the-counter cold medications  d. Anti-dizzy medications (Antivert, Dramamine, etc.)  e. Muscle relaxants    Seizure medications (Dilantin, Phenobartibal, etc.) interfere with the accuracy of the test.  They should be stopped BUT ONLY AFTER CLEARANCE FROM THE PHYSICIAN WHO PRESCRIBED THESE MEDICATIONS.    Heart or blood pressure medications ARE allowed.    2. DO NOT  eat or drink anything other than small sips of water for 4 hours prior to the VNG.    3. DO NOT drink alcoholic beverages for 24 hours prior to the test.      4. DO NOT wear makeup, especially mascara or eye liner for the VNG test.    5. Ladies who are scheduled for Posturography should wear slacks rather than skirts or dresses.    6. If you are diabetic, please inform the  when booking your appointment.  Please schedule a time that would make fasting for 4 hours most convenient to your medication routine.  If you have any concerns, check with your primary care physician.    Testing is scheduled at [facility] at [address] [place] [address]. After the physician orders your balance testing, a  representative from the main campus will call you to schedule your appointment. If you need to cancel or re-schedule, please call them at [phone].    If you have any questions, please call [phone] to reach us at [facility] in [place].

## 2020-12-03 ENCOUNTER — PATIENT MESSAGE (OUTPATIENT)
Dept: NEUROLOGY | Facility: CLINIC | Age: 30
End: 2020-12-03

## 2020-12-03 ENCOUNTER — OFFICE VISIT (OUTPATIENT)
Dept: NEUROLOGY | Facility: CLINIC | Age: 30
End: 2020-12-03
Payer: COMMERCIAL

## 2020-12-03 VITALS
SYSTOLIC BLOOD PRESSURE: 122 MMHG | HEIGHT: 65 IN | HEART RATE: 101 BPM | WEIGHT: 170.5 LBS | BODY MASS INDEX: 28.41 KG/M2 | TEMPERATURE: 98 F | DIASTOLIC BLOOD PRESSURE: 73 MMHG

## 2020-12-03 DIAGNOSIS — G37.9 CLINICALLY ISOLATED SYNDROME: Primary | ICD-10-CM

## 2020-12-03 PROCEDURE — 99999 PR PBB SHADOW E&M-EST. PATIENT-LVL III: CPT | Mod: PBBFAC,,, | Performed by: PSYCHIATRY & NEUROLOGY

## 2020-12-03 PROCEDURE — 99999 PR PBB SHADOW E&M-EST. PATIENT-LVL III: ICD-10-PCS | Mod: PBBFAC,,, | Performed by: PSYCHIATRY & NEUROLOGY

## 2020-12-03 PROCEDURE — 1126F PR PAIN SEVERITY QUANTIFIED, NO PAIN PRESENT: ICD-10-PCS | Mod: S$GLB,,, | Performed by: PSYCHIATRY & NEUROLOGY

## 2020-12-03 PROCEDURE — 1126F AMNT PAIN NOTED NONE PRSNT: CPT | Mod: S$GLB,,, | Performed by: PSYCHIATRY & NEUROLOGY

## 2020-12-03 PROCEDURE — 3008F BODY MASS INDEX DOCD: CPT | Mod: CPTII,S$GLB,, | Performed by: PSYCHIATRY & NEUROLOGY

## 2020-12-03 PROCEDURE — 99215 PR OFFICE/OUTPT VISIT, EST, LEVL V, 40-54 MIN: ICD-10-PCS | Mod: S$GLB,,, | Performed by: PSYCHIATRY & NEUROLOGY

## 2020-12-03 PROCEDURE — 99215 OFFICE O/P EST HI 40 MIN: CPT | Mod: S$GLB,,, | Performed by: PSYCHIATRY & NEUROLOGY

## 2020-12-03 PROCEDURE — 3008F PR BODY MASS INDEX (BMI) DOCUMENTED: ICD-10-PCS | Mod: CPTII,S$GLB,, | Performed by: PSYCHIATRY & NEUROLOGY

## 2020-12-03 RX ORDER — DIAZEPAM 5 MG/1
5 TABLET ORAL EVERY 6 HOURS PRN
Qty: 2 TABLET | Refills: 0 | Status: SHIPPED | OUTPATIENT
Start: 2020-12-03 | End: 2020-12-10 | Stop reason: SDUPTHER

## 2020-12-03 NOTE — PATIENT INSTRUCTIONS
Wellness Promotion in MS:    Enhancing wellness is a davis part of your overall multiple sclerosis (MS) treatment plan. The [facility] treatment team encourages you to maintain a healthy lifestyle and participate in wellness activities including a healthy diet, regular exercise and stress-reduction practices. Healthier people with MS maintain function and independence longer than those with diseases such as high blood pressure, diabetes, high cholesterol, and obesity. We encourage you to work closely with your primary care physician for routine health screenings and for treatment of your other health conditions.     Diet: Although there are several diets that are popular among people with MS such as the Swank, Edgar, and Wahls diets, not enough research exists to prove that they help MS. Yet common sense tells us that eating a healthy diet that benefits overall health will likely also help MS. Studies show that obesity may be a risk factor for developing MS, and that having high blood pressure, high cholesterol, and diabetes cause MS to progress faster. Luckily, changes in diet can improve obesity, high blood pressure, high cholesterol, and diabetes.     Here are our recommendations: 1. Eat more fruits, vegetables, and fiber. 2. Choose healthy fats (from nuts, seeds, vegetable oils and oily fish) and lean sources of protein (chicken, turkey, soy products, fish, and beans). 3. Limit (or eliminate) sugar and processed foods.    Exercise: Research shows that regular exercise may improve fatigue, cognitive impairment, depression, mobility, and quality of life in people with MS. Exercise guidelines published by the MS Society of [place] advise that adults with MS  and mild to moderate disability get 30 minutes of moderate-intensity aerobic exercise twice a week, and do strength training exercises twice a week. A physical or occupational therapist can recommend a personalized exercise plan based on your needs,  abilities, and interests. Modified exercise programs, such as seated yoga or aquatic exercise, or adaptive equipment such as a recumbent bicycle or tricycle make exercise possible for nearly everyone with Ms.    Stress-reduction practices: Studies show that mind-body connection activities reduce MS symptoms and improve quality of life. They can also be helpful for depression and other mood disorders that are common in MS. Mindfulness, meditation, acupuncture and massage are all examples of stress-reduction practices that strengthen the mind-body connections and have been shown to help people with MS manage stress, depression, and anxiety. Staying physically active with aerobic exercise, resistance training, yoga, and dandre chi can also help reduce depression and help you feel better by reducing stress.    Vitamin D: Evidence shows that low vitamin D increases the risk of developing MS and may make MS relapses more severe. Therefore taking vitamin D may make your MS better. You can get vitamin D from sunlight and from foods including oily fish (salmon, mackerel, sardines, herring, and trout), egg yolks, and cod liver oil. In addition, some dairy products, cereals, and juices have been fortified with vitamin D. Vitamin D levels are measured with a blood test. We suggest at least maintaining a vitamin D level in the upper half of the normal range for your laboratory. Many people with MS have low vitamin D levels in the blood, despite a good diet and sun exposure, and may need to take vitamin D3 capsules or tablets to achieve the desired blood levels of vitamin D. Start taking Vit D3 2000 IU daily.    Smoking: Smoking increases the risk of both getting MS and of developing secondary-progressive MS. Smoking may make disability levels progress faster, and may make certain disease modifying therapies less effective. Quitting smoking is necessary to treat your MS and your overall health.    Sleep: Sleep is key to maintaining  health. Many individuals with MS report significant disruptions in sleep and difficulty maintaining a consistent sleep schedule. Please ask for a Sleep Hygiene Information Sheet for additional advice.    Additional Resources:  http://www.nationalmssociety.org/Living-Well- With-MS/Health- Wellness  http://www.nationalmssociety.org/Resources-Support/Xgroqnj-Rlcwmhykb-Mraugnmt/Brochures/Staying- Well

## 2020-12-03 NOTE — PROGRESS NOTES
I saw Jessica Garcia as a new patient today for multiple sclerosis evaluation.  She comes in to establish care and is referred by No ref. provider found.  She is accompanied by no one.     History of Present Illness  The patient is a 30 y.o. RH female with fibromyalgia and vit D deficiency who presents for abnormal MRI brain, concern for MS.  Symptom history:  2016 - vertigo for one day, given meclizine and it went away  Symptom onset 8/2020 - ruptured fallopian tube cyst, had a CT w/contrast. Following this, she developed vertigo for 2 weeks (similar to 2016 but more severe), thought due to the contrast with the study.  11/2020 - started with right temporal/retro-orbital headache along with vertigo, felt like her head was spinning. Given fioricet, but it was not helpful. Seen by Dr. Suarez and found that she had supratentorial and infratentorial lesions. Recommended to get a spinal tap, but she has severe phobia. Then a second opinion with Dr. Tobias, who referred her here. Given another rx of meclizine, but it was not helpful.  12/2020 seen by ENT, but no issues with ears    Outside of this, no clear typical demyelinating events. Suffers with constipation. Experienced 5 min of right cheek numbness prior.    Fibromyalgia diagnosis - diagnosed 9 years ago. Has family history of such. Never on medication. Uses exercise and vitamins. Last flare was 2015. Typical flare with severe fatigue, difficulty walking, widespread pain, etc.    No family of MS. Has family history of Meniere's.    Review of systems:    Vertigo/Dizziness: yes, vertigo   Headaches: yes, but resolved   Visual Symptoms: No    Bladder Dysfunction: No    Bowel Dysfunction: Yes - constipation, but no incontinence    Pain: No currently   Skin Breakdown: No    Cognitive: No    Dysphagia: No    Dysarthria: No    Hand Dysfunction: No   Gait Disturbance: No    Falls: No   Spasticity: No    Sensation changes: none currently   Mood Disorder: No  generally, but some anxiety over situation   Fatigue: No generally   Sleep Disturbance: Yes - some insomnia with anxiety as above    Sexual Dysfunction: none generally but decreased desire   Tremors: No    Heat sensitivity: No    Any un-met adaptive needs? No    Copay Assist? N/a      Past Medical History:   Diagnosis Date    Herniation of lumbar intervertebral disc     Hypercholesteremia     Tachycardia        History reviewed. No pertinent surgical history.    Family History   Problem Relation Age of Onset    Thyroid disease Mother     Hypertension Sister     Heart disease Maternal Grandfather     Hypertension Paternal Grandmother     Hyperlipidemia Sister     Melanoma Neg Hx        Social History     Tobacco Use    Smoking status: Former Smoker     Types: Cigarettes     Start date: 2009     Quit date: 7/3/2016     Years since quittin.4    Smokeless tobacco: Never Used   Substance Use Topics    Alcohol use: Yes     Alcohol/week: 1.0 standard drinks     Types: 1 Glasses of wine per week     Frequency: Monthly or less     Drinks per session: 1 or 2     Binge frequency: Never    Drug use: No       Review of patient's allergies indicates:   Allergen Reactions    Strawberries [strawberry] Swelling    Iodine and iodide containing products      Causes dizziness and headaches    Lavender Rash       Current Outpatient Medications on File Prior to Visit   Medication Sig Dispense Refill Last Dose    fluticasone propionate (FLONASE) 50 mcg/actuation nasal spray 2 sprays (100 mcg total) by Each Nostril route once daily. 16 g 2 Taking    hydrocortisone 2.5 % cream Apply topically 2 (two) times daily. 28 g 1 Taking    hydrOXYzine HCl (ATARAX) 25 MG tablet Take 1-2 tabs PO qhs prn itching. Do not drive or operate machinery while taking this medicine. 60 tablet 0 Taking    meclizine (ANTIVERT) 12.5 mg tablet Take 2 tablets (25 mg total) by mouth 3 (three) times daily as needed for Dizziness. 60  "tablet 1 Taking    metoclopramide HCl (REGLAN) 10 MG tablet Take 1 tablet (10 mg total) by mouth every 6 (six) hours as needed. 30 tablet 0 Taking    polyethylene glycol (GLYCOLAX) 17 gram/dose powder Take 17 g by mouth once daily. 235 g 0 Taking    triamcinolone acetonide 0.1% (KENALOG) 0.1 % cream Apply to affected areas of body BID prn rash. Do not use on face, underarms, or groin. 80 g 3 Taking    albuterol (PROVENTIL/VENTOLIN HFA) 90 mcg/actuation inhaler Inhale 1-2 puffs into the lungs every 6 (six) hours as needed for Wheezing. Rescue (Patient not taking: Reported on 12/3/2020) 1 Inhaler 0 Not Taking    butalbital-acetaminophen-caffeine -40 mg (FIORICET, ESGIC) -40 mg per tablet Take 1 tablet by mouth every 6 (six) hours as needed for Headaches. (Patient not taking: Reported on 12/3/2020) 20 tablet 0 Not Taking    methylPREDNISolone (MEDROL DOSEPACK) 4 mg tablet use as directed (Patient not taking: Reported on 12/1/2020) 1 Package 0 Not Taking    [DISCONTINUED] AUROVELA FE 1-20, 28, 1 mg-20 mcg (21)/75 mg (7) per tablet    Not Taking    [DISCONTINUED] ibuprofen (ADVIL,MOTRIN) 800 MG tablet Take 800 mg by mouth as needed for Pain.   Not Taking         Physical Exam    /73   Pulse 101   Temp 97.8 °F (36.6 °C)   Ht 5' 5" (1.651 m)   Wt 77.3 kg (170 lb 8.4 oz)   BMI 28.38 kg/m²       In general, the patient is well nourished.    Fundi are normal bilaterally.     25 foot timed walk: 3.83s, 3.76s (no assistance)    MENTAL STATUS: language is fluent, normal verbal comprehension, attention is normal, patient is alert and oriented x 3, fund of knowlege is appropriate by vocabulary.     CRANIAL NERVE EXAM:  There is no internuclear ophthalmoplegia.  Extraocular muscles are intact. Pupils are equal, round, and reactive to light. No facial asymmetry. Facial sensation is intact bilaterally. There is no dysarthria. Uvula is midline, and palate moves symmetrically. Shoulder shrug intact " bilaterlly. Tongue protrusion is midline. Hearing is grossly intact. Neck is supple.     MOTOR EXAM: Normal bulk and tone throughout UE and LE bilaterally.   No pronator drift;      ? Delt Bi Tri WE WF FDI HF KE KF ADF APF   Right 5 5 5 5 5 5 5 5 5 5 5   Left 5 5 5 5 5 5 5 5 5 5 5       REFLEXES: 2+ and symmetric throughout in all four extremities except 3+ left biceps; toes are mute bilaterally    SENSORY EXAM: Normal to light touch and temperature throughout.    COORDINATION: Normal finger-to-nose exam. Normal Romberg    GAIT: Narrow based and stable. Can heel/toe/tandem        IMAGING (personally reviewed):  No results found for this or any previous visit.  No results found for this or any previous visit.  No results found for this or any previous visit.  Results for orders placed during the hospital encounter of 11/16/20   MRI Brain Demyelinating W W/O Contrast    Impression Brain appears stable from recent examination again demonstrating several scattered T2/FLAIR hyperintense lesions in the supratentorial and infratentorial white matter.  Distribution nonspecific, but remains suspicious for demyelinating disease.  Alternate less common differential considerations to include Lyme disease, prior vascular insult, or other inflammatory process.  No enhancing foci on postcontrast imaging to specifically indicate active demyelination.  Clinical correlation required.    Spinal cord appears within normal limits.  No focal lesions or enhancement to suggest recent or remote demyelination.    1.7 cm T2 hyperintense nodule in the right lobe of the thyroid gland.  Recommend thyroid ultrasound for further characterization.      Electronically signed by: Ariel Nolan MD  Date:    11/17/2020  Time:    07:44     Results for orders placed during the hospital encounter of 11/16/20   MRI Cervical Spine Demyelinating W W/O Contrast    Impression Brain appears stable from recent examination again demonstrating several scattered  T2/FLAIR hyperintense lesions in the supratentorial and infratentorial white matter.  Distribution nonspecific, but remains suspicious for demyelinating disease.  Alternate less common differential considerations to include Lyme disease, prior vascular insult, or other inflammatory process.  No enhancing foci on postcontrast imaging to specifically indicate active demyelination.  Clinical correlation required.    Spinal cord appears within normal limits.  No focal lesions or enhancement to suggest recent or remote demyelination.    1.7 cm T2 hyperintense nodule in the right lobe of the thyroid gland.  Recommend thyroid ultrasound for further characterization.      Electronically signed by: Ariel Nolan MD  Date:    11/17/2020  Time:    07:44     Results for orders placed during the hospital encounter of 11/16/20   MRI Thoracic Spine Demyelinating W W/O Contrast    Impression Brain appears stable from recent examination again demonstrating several scattered T2/FLAIR hyperintense lesions in the supratentorial and infratentorial white matter.  Distribution nonspecific, but remains suspicious for demyelinating disease.  Alternate less common differential considerations to include Lyme disease, prior vascular insult, or other inflammatory process.  No enhancing foci on postcontrast imaging to specifically indicate active demyelination.  Clinical correlation required.    Spinal cord appears within normal limits.  No focal lesions or enhancement to suggest recent or remote demyelination.    1.7 cm T2 hyperintense nodule in the right lobe of the thyroid gland.  Recommend thyroid ultrasound for further characterization.      Electronically signed by: Ariel Nolan MD  Date:    11/17/2020  Time:    07:44       LABS:  Lab Results   Component Value Date    WBC 8.11 11/17/2020    HGB 13.9 11/17/2020    HCT 42.6 11/17/2020    MCV 95 11/17/2020     (H) 11/17/2020         Chemistry        Component Value Date/Time    NA  138 11/17/2020 1542    K 3.9 11/17/2020 1542     11/17/2020 1542    CO2 23 11/17/2020 1542    BUN 10 11/17/2020 1542    CREATININE 0.7 11/17/2020 1542    GLU 84 11/17/2020 1542        Component Value Date/Time    CALCIUM 9.1 11/17/2020 1542    ALKPHOS 41 (L) 11/17/2020 1542    AST 23 11/17/2020 1542    ALT 29 11/17/2020 1542    BILITOT 0.5 11/17/2020 1542    ESTGFRAFRICA >60.0 11/17/2020 1542    EGFRNONAA >60.0 11/17/2020 1542            Lab Results   Component Value Date    ALBUMIN 3.7 11/17/2020             Diagnosis/Assessment/Plan:       1. Clinically Isolated Syndrome  · Assessment: Agree that patient has MRI and clinical history suspicious for MS, but she does not fulfill criteria for MS diagnosis without dissemination in time. Will obtain CSF to help fulfill diagnosis.   · Imaging: no further imaging needed  · Labs: CSF studies and NMO ab ordered  · Extensive counseling given concerning diagnosis and further w/u      Our visit today lasted 80 minutes, and 100% of this time was spent face to face with the patient. Over 50% of this visit included discussion of the treatment plan/medication changes/symptom management/exam findings/imaging results/coordination of care. The patient agrees with the plan of care.     F/u in 2-3 weeks    Neena King MD

## 2020-12-08 ENCOUNTER — PROCEDURE VISIT (OUTPATIENT)
Dept: NEUROLOGY | Facility: CLINIC | Age: 30
End: 2020-12-08
Payer: COMMERCIAL

## 2020-12-08 VITALS
HEART RATE: 114 BPM | TEMPERATURE: 98 F | HEIGHT: 65 IN | BODY MASS INDEX: 28.96 KG/M2 | WEIGHT: 173.81 LBS | DIASTOLIC BLOOD PRESSURE: 81 MMHG | SYSTOLIC BLOOD PRESSURE: 134 MMHG

## 2020-12-08 DIAGNOSIS — G37.9 CLINICALLY ISOLATED SYNDROME: Primary | ICD-10-CM

## 2020-12-08 PROCEDURE — 62270 PR SPINAL PUNCTURE,LUMBAR,DIAGNOSTIC: ICD-10-PCS | Mod: 53,S$GLB,, | Performed by: PHYSICIAN ASSISTANT

## 2020-12-08 PROCEDURE — 62270 DX LMBR SPI PNXR: CPT | Mod: 53,S$GLB,, | Performed by: PHYSICIAN ASSISTANT

## 2020-12-08 RX ORDER — NORETHINDRONE ACETATE AND ETHINYL ESTRADIOL, ETHINYL ESTRADIOL AND FERROUS FUMARATE 1MG-10(24)
1 KIT ORAL DAILY
COMMUNITY
Start: 2020-11-28 | End: 2021-03-19 | Stop reason: SDUPTHER

## 2020-12-09 ENCOUNTER — PATIENT MESSAGE (OUTPATIENT)
Dept: NEUROLOGY | Facility: CLINIC | Age: 30
End: 2020-12-09

## 2020-12-09 NOTE — PROCEDURES
Lumbar Puncture    Date/Time: 12/8/2020 9:45 AM  Location procedure was performed: [facility]  Performed by: Soo Uriostegui PA-C  Authorized by: Soo Uriostegui PA-C   Assisting provider: Hazel Childs LPN  Pre-operative diagnosis:  CIS  Post-operative diagnosis: CIS  Consent Done: Yes  Anesthesia: local infiltration    Anesthesia:  Local Anesthetic: lidocaine 1% without epinephrine  Anesthetic total: 1.5 mL  Patient sedated: yes  Sedatives: diazepam  Preparation: Patient was prepped and draped in the usual sterile fashion.  Lumbar space: L3-L4 interspace  Patient's position: sitting  Needle gauge: 22  Needle type: Fina pencil tip  Needle length: 3.5 in  Number of attempts: 1  Post-procedure: site cleaned and adhesive bandage applied  Complications: Yes (patient extremely anxious even with valium -attempted procedure -in process and patient experienced vagal reaction-placed in sidelying-recovered nicely but procedure discontinued. She will require sedation for procedure)  Estimated blood loss (mL): 0  Specimens: No

## 2020-12-10 ENCOUNTER — TELEPHONE (OUTPATIENT)
Dept: NEUROLOGY | Facility: CLINIC | Age: 30
End: 2020-12-10

## 2020-12-10 DIAGNOSIS — G37.9 CLINICALLY ISOLATED SYNDROME: Primary | ICD-10-CM

## 2020-12-10 RX ORDER — DIAZEPAM 5 MG/1
TABLET ORAL
Qty: 3 TABLET | Refills: 0 | Status: SHIPPED | OUTPATIENT
Start: 2020-12-10 | End: 2021-02-12

## 2020-12-10 NOTE — TELEPHONE ENCOUNTER
----- Message from Na Stroud sent at 12/10/2020  9:14 AM CST -----  Regarding: pt advice  Contact: pt @ [phone]  Pt asking to speak with someone in Dr. King' office regarding her lumbar puncture, says she fainted and it was not done, says she was instructed to speak with Dr. King. Please call.

## 2020-12-10 NOTE — TELEPHONE ENCOUNTER
Patient informed of LP w/ fluoro scheduled on Thursday, December 17, at 10 AM. Patient also informed she should have no blood thinners, aspirin, or aspirin containing products 5days prior to Lp. Patient informed she should have NPO after midnight, and only allowed to drink water up to two hours prior; PVU. Patient notified of Rx for Valium sent to her preferred pharmacy, and given instructions on how to take said medication. PVU.

## 2020-12-17 ENCOUNTER — HOSPITAL ENCOUNTER (OUTPATIENT)
Dept: RADIOLOGY | Facility: HOSPITAL | Age: 30
Discharge: HOME | End: 2020-12-17
Attending: PSYCHIATRY & NEUROLOGY
Payer: COMMERCIAL

## 2020-12-17 ENCOUNTER — PATIENT MESSAGE (OUTPATIENT)
Dept: NEUROLOGY | Facility: CLINIC | Age: 30
End: 2020-12-17

## 2020-12-17 DIAGNOSIS — G37.9 CLINICALLY ISOLATED SYNDROME: ICD-10-CM

## 2020-12-17 LAB
CLARITY CSF: CLEAR
COLOR CSF: COLORLESS
GLUCOSE CSF-MCNC: 51 MG/DL (ref 40–70)
LYMPHOCYTES NFR CSF MANUAL: 95 % (ref 40–80)
MONOS+MACROS NFR CSF MANUAL: 3 % (ref 15–45)
NEUTROPHILS NFR CSF MANUAL: 2 % (ref 0–6)
PROT CSF-MCNC: 20 MG/DL (ref 15–40)
RBC # CSF: 56 /CU MM
SPECIMEN VOL CSF: 4.5 ML
WBC # CSF: 8 /CU MM (ref 0–5)

## 2020-12-17 PROCEDURE — 62328 FL LUMBAR PUNCTURE MS PROTOCOL: ICD-10-PCS | Mod: 26,,, | Performed by: RADIOLOGY

## 2020-12-17 PROCEDURE — 83916 OLIGOCLONAL BANDS: CPT | Mod: 91

## 2020-12-17 PROCEDURE — 83916 OLIGOCLONAL BANDS: CPT

## 2020-12-17 PROCEDURE — 83883 ASSAY NEPHELOMETRY NOT SPEC: CPT

## 2020-12-17 PROCEDURE — 62328 DX LMBR SPI PNXR W/FLUOR/CT: CPT | Mod: 26,,, | Performed by: RADIOLOGY

## 2020-12-17 PROCEDURE — 82945 GLUCOSE OTHER FLUID: CPT

## 2020-12-17 PROCEDURE — 82164 ANGIOTENSIN I ENZYME TEST: CPT

## 2020-12-17 PROCEDURE — 62328 DX LMBR SPI PNXR W/FLUOR/CT: CPT | Mod: TC

## 2020-12-17 PROCEDURE — 25000003 PHARM REV CODE 250: Performed by: PSYCHIATRY & NEUROLOGY

## 2020-12-17 PROCEDURE — 89051 BODY FLUID CELL COUNT: CPT

## 2020-12-17 PROCEDURE — 84157 ASSAY OF PROTEIN OTHER: CPT

## 2020-12-17 PROCEDURE — 99000 SPECIMEN HANDLING OFFICE-LAB: CPT

## 2020-12-17 RX ORDER — LIDOCAINE HYDROCHLORIDE 10 MG/ML
4 INJECTION INFILTRATION; PERINEURAL ONCE
Status: COMPLETED | OUTPATIENT
Start: 2020-12-17 | End: 2020-12-17

## 2020-12-17 RX ADMIN — LIDOCAINE HYDROCHLORIDE 4 ML: 10 INJECTION, SOLUTION EPIDURAL; INFILTRATION; INTRACAUDAL; PERINEURAL at 10:12

## 2020-12-17 NOTE — PROCEDURES
Radiology Post-Procedure Note    Pre Op Diagnosis: concern for MS    Post Op Diagnosis: Same    Procedure: lumbar puncture    Procedure performed by: Rodrick Gordon MD    Written Informed Consent Obtained: Yes    Specimen Removed: 12 mL CSF    Estimated Blood Loss: Minimal    Findings: Following written informed consent and sterile prep and drape, a 22 gauge spinal needle was inserted at L3 - L4 intralaminar space under fluoroscopic surveillance. Opening pressure was 16 cm H2O. 12 mL clear CSF removed and sent to the lab for further analysis.  There were no complications.    Patient tolerated procedure well.    Rodrick Gordon MD  Radiology PGY-5  [phone]

## 2020-12-17 NOTE — H&P
Radiology History & Physical      SUBJECTIVE:     Chief Complaint: concern for MS    History of Present Illness:  Jessica Garcia is a 30 y.o. female who presents for lumbar puncture.   Past Medical History:   Diagnosis Date    Herniation of lumbar intervertebral disc     Hypercholesteremia     Tachycardia      No past surgical history on file.    Home Meds:   Prior to Admission medications    Medication Sig Start Date End Date Taking? Authorizing Provider   albuterol (PROVENTIL/VENTOLIN HFA) 90 mcg/actuation inhaler Inhale 1-2 puffs into the lungs every 6 (six) hours as needed for Wheezing. Rescue 1/6/19 12/8/20  Isaias Aranda MD   butalbital-acetaminophen-caffeine -40 mg (FIORICET, ESGIC) -40 mg per tablet Take 1 tablet by mouth every 6 (six) hours as needed for Headaches. 10/20/20   Issac Cobian Jr., MD   diazePAM (VALIUM) 5 MG tablet Take 10mg 1 hours prior to LP. Can take extra 5mg just prior to LP if needed. 12/10/20   Neena King MD   fluticasone propionate (FLONASE) 50 mcg/actuation nasal spray 2 sprays (100 mcg total) by Each Nostril route once daily. 10/20/20   Issac Cobian Jr., MD   hydrocortisone 2.5 % cream Apply topically 2 (two) times daily. 7/15/19   Shira Lyles NP   hydrOXYzine HCl (ATARAX) 25 MG tablet Take 1-2 tabs PO qhs prn itching. Do not drive or operate machinery while taking this medicine. 10/21/19   Jessica Graves MD   LO LOESTRIN FE 1 mg-10 mcg (24)/10 mcg (2) Tab Take 1 tablet by mouth once daily. 11/28/20   Historical Provider   meclizine (ANTIVERT) 12.5 mg tablet Take 2 tablets (25 mg total) by mouth 3 (three) times daily as needed for Dizziness. 11/20/20   Montse Tobias MD   methylPREDNISolone (MEDROL DOSEPACK) 4 mg tablet use as directed  Patient not taking: Reported on 12/1/2020 11/5/20   Gurjit Suarez MD   metoclopramide HCl (REGLAN) 10 MG tablet Take 1 tablet (10 mg total) by mouth every 6 (six) hours as needed. 8/12/19   Javy ARELLANO  MD Anna   polyethylene glycol (GLYCOLAX) 17 gram/dose powder Take 17 g by mouth once daily. 8/13/19   Lily Laurent MD   triamcinolone acetonide 0.1% (KENALOG) 0.1 % cream Apply to affected areas of body BID prn rash. Do not use on face, underarms, or groin. 10/21/19   Jessica Graves MD     Anticoagulants/Antiplatelets: no anticoagulation    Allergies:   Review of patient's allergies indicates:   Allergen Reactions    Strawberries [strawberry] Swelling    Iodine and iodide containing products      Causes dizziness and headaches    Lavender Rash     Sedation History:  no adverse reactions    Review of Systems:   Hematological: no known coagulopathies  Respiratory: no shortness of breath  Cardiovascular: no chest pain  Gastrointestinal: no abdominal pain  Genito-Urinary: no dysuria  Musculoskeletal: negative  Neurological: no TIA or stroke symptoms         OBJECTIVE:     Vital Signs (Most Recent)       Physical Exam:  ASA: 2  Mallampati: 2    General: no acute distress  Mental Status: alert and oriented to person, place and time  HEENT: normocephalic, atraumatic  Chest: unlabored breathing  Heart: regular heart rate  Abdomen: nondistended  Extremity: moves all extremities    Laboratory  No results found for: INR    Lab Results   Component Value Date    WBC 8.11 11/17/2020    HGB 13.9 11/17/2020    HCT 42.6 11/17/2020    MCV 95 11/17/2020     (H) 11/17/2020      Lab Results   Component Value Date    GLU 84 11/17/2020     11/17/2020    K 3.9 11/17/2020     11/17/2020    CO2 23 11/17/2020    BUN 10 11/17/2020    CREATININE 0.7 11/17/2020    CALCIUM 9.1 11/17/2020    ALT 29 11/17/2020    AST 23 11/17/2020    ALBUMIN 3.7 11/17/2020    BILITOT 0.5 11/17/2020       ASSESSMENT/PLAN:     Sedation Plan: local  Patient will undergo lumbar puncture.    Rodrick Gordon MD  Radiology PGY-5  [phone]

## 2020-12-18 ENCOUNTER — PATIENT MESSAGE (OUTPATIENT)
Dept: NEUROLOGY | Facility: CLINIC | Age: 30
End: 2020-12-18

## 2020-12-19 LAB — ACE CSF-CCNC: 0.5 U/L (ref 0–2.5)

## 2020-12-21 LAB — KAPPA LC FREE CSF-MCNC: 2.13 MG/DL

## 2020-12-22 LAB
OLIGOCLONAL BANDS CSF ELPH-IMP: 10 BANDS
OLIGOCLONAL BANDS CSF ELPH-IMP: 9 BANDS
OLIGOCLONAL BANDS SERPL: 1 BANDS

## 2020-12-30 ENCOUNTER — PATIENT OUTREACH (OUTPATIENT)
Dept: ADMINISTRATIVE | Facility: OTHER | Age: 30
End: 2020-12-30

## 2020-12-30 NOTE — PROGRESS NOTES
Care Everywhere:   Immunization: updated, links delay  Health Maintenance: updated  Media Review:   Legacy Review:   Order placed:   Upcoming appts:

## 2021-01-04 ENCOUNTER — PATIENT MESSAGE (OUTPATIENT)
Dept: NEUROLOGY | Facility: CLINIC | Age: 31
End: 2021-01-04

## 2021-01-04 ENCOUNTER — PATIENT MESSAGE (OUTPATIENT)
Dept: ADMINISTRATIVE | Facility: HOSPITAL | Age: 31
End: 2021-01-04

## 2021-01-04 ENCOUNTER — OFFICE VISIT (OUTPATIENT)
Dept: NEUROLOGY | Facility: CLINIC | Age: 31
End: 2021-01-04
Payer: COMMERCIAL

## 2021-01-04 DIAGNOSIS — G35 MULTIPLE SCLEROSIS: Primary | ICD-10-CM

## 2021-01-04 PROCEDURE — 99215 OFFICE O/P EST HI 40 MIN: CPT | Mod: 95,,, | Performed by: PSYCHIATRY & NEUROLOGY

## 2021-01-04 PROCEDURE — 99215 PR OFFICE/OUTPT VISIT, EST, LEVL V, 40-54 MIN: ICD-10-PCS | Mod: 95,,, | Performed by: PSYCHIATRY & NEUROLOGY

## 2021-01-05 ENCOUNTER — OFFICE VISIT (OUTPATIENT)
Dept: FAMILY MEDICINE | Facility: CLINIC | Age: 31
End: 2021-01-05
Attending: FAMILY MEDICINE
Payer: COMMERCIAL

## 2021-01-05 DIAGNOSIS — G35 MULTIPLE SCLEROSIS: ICD-10-CM

## 2021-01-05 PROCEDURE — 99213 OFFICE O/P EST LOW 20 MIN: CPT | Mod: 95,,, | Performed by: FAMILY MEDICINE

## 2021-01-05 PROCEDURE — 99213 PR OFFICE/OUTPT VISIT, EST, LEVL III, 20-29 MIN: ICD-10-PCS | Mod: 95,,, | Performed by: FAMILY MEDICINE

## 2021-01-08 ENCOUNTER — LAB VISIT (OUTPATIENT)
Dept: LAB | Facility: HOSPITAL | Age: 31
End: 2021-01-08
Attending: PSYCHIATRY & NEUROLOGY
Payer: COMMERCIAL

## 2021-01-08 DIAGNOSIS — G35 MULTIPLE SCLEROSIS: ICD-10-CM

## 2021-01-08 PROCEDURE — 86360 T CELL ABSOLUTE COUNT/RATIO: CPT

## 2021-01-08 PROCEDURE — 86359 T CELLS TOTAL COUNT: CPT

## 2021-01-11 LAB
ABSOLUTE CD3: 2872 CELLS/UL (ref 700–2100)
ABSOLUTE CD8: 1139 CELLS/UL (ref 200–900)
CD3%: 72.7 % (ref 55–83)
CD3+CD4+ CELLS # BLD: 1750 CELLS/UL (ref 300–1400)
CD3+CD4+ CELLS NFR BLD: 44.3 % (ref 28–57)
CD4/CD8 RATIO: 1.54 (ref 0.9–3.6)
CD8 % SUPPRESSOR T CELL: 28.8 % (ref 10–39)

## 2021-01-14 ENCOUNTER — TELEPHONE (OUTPATIENT)
Dept: PHARMACY | Facility: CLINIC | Age: 31
End: 2021-01-14

## 2021-01-14 DIAGNOSIS — G35 MULTIPLE SCLEROSIS: Primary | ICD-10-CM

## 2021-01-14 NOTE — TELEPHONE ENCOUNTER
----- Message from Neena King MD sent at 1/4/2021  5:25 PM CST -----  Please start Tecfidera for me

## 2021-01-15 ENCOUNTER — PATIENT MESSAGE (OUTPATIENT)
Dept: NEUROLOGY | Facility: CLINIC | Age: 31
End: 2021-01-15

## 2021-01-15 ENCOUNTER — SPECIALTY PHARMACY (OUTPATIENT)
Dept: PHARMACY | Facility: CLINIC | Age: 31
End: 2021-01-15

## 2021-01-15 RX ORDER — DIMETHYL FUMARATE 240 MG/1
240 CAPSULE ORAL 2 TIMES DAILY
Qty: 60 CAPSULE | Refills: 2 | Status: SHIPPED | OUTPATIENT
Start: 2021-01-15 | End: 2021-05-12 | Stop reason: SDUPTHER

## 2021-01-15 RX ORDER — DIMETHYL FUMARATE 120-240 MG
1 KIT ORAL 2 TIMES DAILY
Qty: 60 CAPSULE | Refills: 0 | Status: SHIPPED | OUTPATIENT
Start: 2021-01-15 | End: 2021-04-12

## 2021-01-18 ENCOUNTER — PATIENT MESSAGE (OUTPATIENT)
Dept: FAMILY MEDICINE | Facility: CLINIC | Age: 31
End: 2021-01-18

## 2021-01-19 ENCOUNTER — PATIENT MESSAGE (OUTPATIENT)
Dept: FAMILY MEDICINE | Facility: CLINIC | Age: 31
End: 2021-01-19

## 2021-01-21 ENCOUNTER — SPECIALTY PHARMACY (OUTPATIENT)
Dept: PHARMACY | Facility: CLINIC | Age: 31
End: 2021-01-21

## 2021-02-05 ENCOUNTER — PATIENT MESSAGE (OUTPATIENT)
Dept: NEUROLOGY | Facility: CLINIC | Age: 31
End: 2021-02-05

## 2021-02-11 ENCOUNTER — PATIENT OUTREACH (OUTPATIENT)
Dept: ADMINISTRATIVE | Facility: OTHER | Age: 31
End: 2021-02-11

## 2021-02-12 ENCOUNTER — OFFICE VISIT (OUTPATIENT)
Dept: NEUROLOGY | Facility: CLINIC | Age: 31
End: 2021-02-12
Payer: COMMERCIAL

## 2021-02-12 VITALS
HEART RATE: 89 BPM | BODY MASS INDEX: 29.26 KG/M2 | SYSTOLIC BLOOD PRESSURE: 121 MMHG | HEIGHT: 65 IN | TEMPERATURE: 97 F | WEIGHT: 175.63 LBS | DIASTOLIC BLOOD PRESSURE: 70 MMHG

## 2021-02-12 DIAGNOSIS — E55.9 VITAMIN D DEFICIENCY: ICD-10-CM

## 2021-02-12 DIAGNOSIS — H81.4 VERTIGO OF CENTRAL ORIGIN: ICD-10-CM

## 2021-02-12 DIAGNOSIS — Z51.81 ENCOUNTER FOR MONITORING IMMUNOMODULATING THERAPY: ICD-10-CM

## 2021-02-12 DIAGNOSIS — Z79.899 ENCOUNTER FOR MONITORING IMMUNOMODULATING THERAPY: ICD-10-CM

## 2021-02-12 DIAGNOSIS — G35 MULTIPLE SCLEROSIS: Primary | ICD-10-CM

## 2021-02-12 PROCEDURE — 1126F PR PAIN SEVERITY QUANTIFIED, NO PAIN PRESENT: ICD-10-PCS | Mod: S$GLB,,, | Performed by: PSYCHIATRY & NEUROLOGY

## 2021-02-12 PROCEDURE — 99215 PR OFFICE/OUTPT VISIT, EST, LEVL V, 40-54 MIN: ICD-10-PCS | Mod: S$GLB,,, | Performed by: PSYCHIATRY & NEUROLOGY

## 2021-02-12 PROCEDURE — 3008F PR BODY MASS INDEX (BMI) DOCUMENTED: ICD-10-PCS | Mod: CPTII,S$GLB,, | Performed by: PSYCHIATRY & NEUROLOGY

## 2021-02-12 PROCEDURE — 3008F BODY MASS INDEX DOCD: CPT | Mod: CPTII,S$GLB,, | Performed by: PSYCHIATRY & NEUROLOGY

## 2021-02-12 PROCEDURE — 1126F AMNT PAIN NOTED NONE PRSNT: CPT | Mod: S$GLB,,, | Performed by: PSYCHIATRY & NEUROLOGY

## 2021-02-12 PROCEDURE — 99999 PR PBB SHADOW E&M-EST. PATIENT-LVL IV: CPT | Mod: PBBFAC,,, | Performed by: PSYCHIATRY & NEUROLOGY

## 2021-02-12 PROCEDURE — 99999 PR PBB SHADOW E&M-EST. PATIENT-LVL IV: ICD-10-PCS | Mod: PBBFAC,,, | Performed by: PSYCHIATRY & NEUROLOGY

## 2021-02-12 PROCEDURE — 99215 OFFICE O/P EST HI 40 MIN: CPT | Mod: S$GLB,,, | Performed by: PSYCHIATRY & NEUROLOGY

## 2021-02-15 ENCOUNTER — SPECIALTY PHARMACY (OUTPATIENT)
Dept: PHARMACY | Facility: CLINIC | Age: 31
End: 2021-02-15

## 2021-02-17 ENCOUNTER — PATIENT MESSAGE (OUTPATIENT)
Dept: NEUROLOGY | Facility: CLINIC | Age: 31
End: 2021-02-17

## 2021-02-22 ENCOUNTER — CLINICAL SUPPORT (OUTPATIENT)
Dept: REHABILITATION | Facility: HOSPITAL | Age: 31
End: 2021-02-22
Attending: PSYCHIATRY & NEUROLOGY
Payer: COMMERCIAL

## 2021-02-22 DIAGNOSIS — R42 VERTIGO: ICD-10-CM

## 2021-02-22 DIAGNOSIS — G35 MULTIPLE SCLEROSIS: ICD-10-CM

## 2021-02-22 DIAGNOSIS — H81.4 VERTIGO OF CENTRAL ORIGIN: ICD-10-CM

## 2021-02-22 PROCEDURE — 97162 PT EVAL MOD COMPLEX 30 MIN: CPT | Mod: PO

## 2021-03-04 ENCOUNTER — CLINICAL SUPPORT (OUTPATIENT)
Dept: REHABILITATION | Facility: HOSPITAL | Age: 31
End: 2021-03-04
Payer: COMMERCIAL

## 2021-03-04 DIAGNOSIS — H81.4 VERTIGO OF CENTRAL ORIGIN: ICD-10-CM

## 2021-03-04 PROCEDURE — 97112 NEUROMUSCULAR REEDUCATION: CPT | Mod: PO

## 2021-03-09 ENCOUNTER — CLINICAL SUPPORT (OUTPATIENT)
Dept: REHABILITATION | Facility: HOSPITAL | Age: 31
End: 2021-03-09
Attending: PSYCHIATRY & NEUROLOGY
Payer: COMMERCIAL

## 2021-03-09 DIAGNOSIS — H81.4 VERTIGO OF CENTRAL ORIGIN: Primary | ICD-10-CM

## 2021-03-09 PROCEDURE — 97112 NEUROMUSCULAR REEDUCATION: CPT | Mod: PO

## 2021-03-10 ENCOUNTER — DOCUMENTATION ONLY (OUTPATIENT)
Dept: REHABILITATION | Facility: HOSPITAL | Age: 31
End: 2021-03-10

## 2021-03-10 ENCOUNTER — SPECIALTY PHARMACY (OUTPATIENT)
Dept: PHARMACY | Facility: CLINIC | Age: 31
End: 2021-03-10

## 2021-03-11 ENCOUNTER — CLINICAL SUPPORT (OUTPATIENT)
Dept: REHABILITATION | Facility: HOSPITAL | Age: 31
End: 2021-03-11
Attending: PSYCHIATRY & NEUROLOGY
Payer: COMMERCIAL

## 2021-03-11 DIAGNOSIS — H81.4 VERTIGO OF CENTRAL ORIGIN: ICD-10-CM

## 2021-03-11 PROCEDURE — 97112 NEUROMUSCULAR REEDUCATION: CPT | Mod: PO

## 2021-03-16 ENCOUNTER — OFFICE VISIT (OUTPATIENT)
Dept: FAMILY MEDICINE | Facility: CLINIC | Age: 31
End: 2021-03-16
Attending: FAMILY MEDICINE
Payer: COMMERCIAL

## 2021-03-16 ENCOUNTER — SPECIALTY PHARMACY (OUTPATIENT)
Dept: PHARMACY | Facility: CLINIC | Age: 31
End: 2021-03-16

## 2021-03-16 VITALS
DIASTOLIC BLOOD PRESSURE: 72 MMHG | HEART RATE: 96 BPM | BODY MASS INDEX: 34.36 KG/M2 | WEIGHT: 175 LBS | HEIGHT: 60 IN | OXYGEN SATURATION: 97 % | SYSTOLIC BLOOD PRESSURE: 104 MMHG

## 2021-03-16 DIAGNOSIS — M41.9 SCOLIOSIS OF LUMBOSACRAL SPINE, UNSPECIFIED SCOLIOSIS TYPE: ICD-10-CM

## 2021-03-16 DIAGNOSIS — G35 MULTIPLE SCLEROSIS: ICD-10-CM

## 2021-03-16 DIAGNOSIS — M46.1 SACROILIITIS: ICD-10-CM

## 2021-03-16 DIAGNOSIS — M54.50 LUMBAR SPINE PAIN: Primary | ICD-10-CM

## 2021-03-16 PROCEDURE — 99213 PR OFFICE/OUTPT VISIT, EST, LEVL III, 20-29 MIN: ICD-10-PCS | Mod: S$GLB,,, | Performed by: FAMILY MEDICINE

## 2021-03-16 PROCEDURE — 99999 PR PBB SHADOW E&M-EST. PATIENT-LVL III: CPT | Mod: PBBFAC,,, | Performed by: FAMILY MEDICINE

## 2021-03-16 PROCEDURE — 1125F PR PAIN SEVERITY QUANTIFIED, PAIN PRESENT: ICD-10-PCS | Mod: S$GLB,,, | Performed by: FAMILY MEDICINE

## 2021-03-16 PROCEDURE — 99999 PR PBB SHADOW E&M-EST. PATIENT-LVL III: ICD-10-PCS | Mod: PBBFAC,,, | Performed by: FAMILY MEDICINE

## 2021-03-16 PROCEDURE — 3008F PR BODY MASS INDEX (BMI) DOCUMENTED: ICD-10-PCS | Mod: CPTII,S$GLB,, | Performed by: FAMILY MEDICINE

## 2021-03-16 PROCEDURE — 99213 OFFICE O/P EST LOW 20 MIN: CPT | Mod: S$GLB,,, | Performed by: FAMILY MEDICINE

## 2021-03-16 PROCEDURE — 3008F BODY MASS INDEX DOCD: CPT | Mod: CPTII,S$GLB,, | Performed by: FAMILY MEDICINE

## 2021-03-16 PROCEDURE — 1125F AMNT PAIN NOTED PAIN PRSNT: CPT | Mod: S$GLB,,, | Performed by: FAMILY MEDICINE

## 2021-03-16 RX ORDER — METHYLPREDNISOLONE 4 MG/1
TABLET ORAL
Qty: 21 TABLET | Refills: 0 | Status: SHIPPED | OUTPATIENT
Start: 2021-03-16 | End: 2021-04-12 | Stop reason: ALTCHOICE

## 2021-03-18 ENCOUNTER — PATIENT MESSAGE (OUTPATIENT)
Dept: REHABILITATION | Facility: HOSPITAL | Age: 31
End: 2021-03-18

## 2021-03-18 ENCOUNTER — PATIENT OUTREACH (OUTPATIENT)
Dept: ADMINISTRATIVE | Facility: OTHER | Age: 31
End: 2021-03-18

## 2021-03-19 ENCOUNTER — OFFICE VISIT (OUTPATIENT)
Dept: OBSTETRICS AND GYNECOLOGY | Facility: CLINIC | Age: 31
End: 2021-03-19
Payer: COMMERCIAL

## 2021-03-19 VITALS
HEIGHT: 60 IN | BODY MASS INDEX: 34.58 KG/M2 | WEIGHT: 176.13 LBS | DIASTOLIC BLOOD PRESSURE: 72 MMHG | SYSTOLIC BLOOD PRESSURE: 108 MMHG

## 2021-03-19 DIAGNOSIS — Z01.419 ENCOUNTER FOR GYNECOLOGICAL EXAMINATION WITHOUT ABNORMAL FINDING: Primary | ICD-10-CM

## 2021-03-19 DIAGNOSIS — Z30.9 ENCOUNTER FOR CONTRACEPTIVE MANAGEMENT, UNSPECIFIED TYPE: ICD-10-CM

## 2021-03-19 PROCEDURE — 88175 CYTOPATH C/V AUTO FLUID REDO: CPT | Performed by: OBSTETRICS & GYNECOLOGY

## 2021-03-19 PROCEDURE — 1126F PR PAIN SEVERITY QUANTIFIED, NO PAIN PRESENT: ICD-10-PCS | Mod: S$GLB,,, | Performed by: OBSTETRICS & GYNECOLOGY

## 2021-03-19 PROCEDURE — 1126F AMNT PAIN NOTED NONE PRSNT: CPT | Mod: S$GLB,,, | Performed by: OBSTETRICS & GYNECOLOGY

## 2021-03-19 PROCEDURE — 99999 PR PBB SHADOW E&M-EST. PATIENT-LVL III: CPT | Mod: PBBFAC,,, | Performed by: OBSTETRICS & GYNECOLOGY

## 2021-03-19 PROCEDURE — 99385 PREV VISIT NEW AGE 18-39: CPT | Mod: S$GLB,,, | Performed by: OBSTETRICS & GYNECOLOGY

## 2021-03-19 PROCEDURE — 3008F PR BODY MASS INDEX (BMI) DOCUMENTED: ICD-10-PCS | Mod: CPTII,S$GLB,, | Performed by: OBSTETRICS & GYNECOLOGY

## 2021-03-19 PROCEDURE — 99385 PR PREVENTIVE VISIT,NEW,18-39: ICD-10-PCS | Mod: S$GLB,,, | Performed by: OBSTETRICS & GYNECOLOGY

## 2021-03-19 PROCEDURE — 3008F BODY MASS INDEX DOCD: CPT | Mod: CPTII,S$GLB,, | Performed by: OBSTETRICS & GYNECOLOGY

## 2021-03-19 PROCEDURE — 99999 PR PBB SHADOW E&M-EST. PATIENT-LVL III: ICD-10-PCS | Mod: PBBFAC,,, | Performed by: OBSTETRICS & GYNECOLOGY

## 2021-03-19 RX ORDER — NORETHINDRONE ACETATE AND ETHINYL ESTRADIOL, ETHINYL ESTRADIOL AND FERROUS FUMARATE 1MG-10(24)
1 KIT ORAL DAILY
Qty: 84 TABLET | Refills: 4 | Status: SHIPPED | OUTPATIENT
Start: 2021-03-19 | End: 2021-06-21 | Stop reason: SDUPTHER

## 2021-03-24 ENCOUNTER — PATIENT MESSAGE (OUTPATIENT)
Dept: FAMILY MEDICINE | Facility: CLINIC | Age: 31
End: 2021-03-24

## 2021-03-24 DIAGNOSIS — M46.1 SACROILIITIS: ICD-10-CM

## 2021-03-24 DIAGNOSIS — M51.26 DISC DISPLACEMENT, LUMBAR: Primary | ICD-10-CM

## 2021-03-24 DIAGNOSIS — M54.50 LUMBAR SPINE PAIN: ICD-10-CM

## 2021-03-26 ENCOUNTER — PATIENT MESSAGE (OUTPATIENT)
Dept: NEUROLOGY | Facility: CLINIC | Age: 31
End: 2021-03-26

## 2021-03-26 ENCOUNTER — SPECIALTY PHARMACY (OUTPATIENT)
Dept: PHARMACY | Facility: CLINIC | Age: 31
End: 2021-03-26

## 2021-03-26 DIAGNOSIS — M54.9 DORSALGIA, UNSPECIFIED: ICD-10-CM

## 2021-03-26 LAB
CLINICAL INFO: NORMAL
CYTO CVX: NORMAL
CYTOLOGIST CVX/VAG CYTO: NORMAL
CYTOLOGY CMNT CVX/VAG CYTO-IMP: NORMAL
CYTOLOGY PAP THIN PREP EXPLANATION: NORMAL
DATE OF PREVIOUS PAP: NORMAL
DATE PREVIOUS BX: NO
LMP START DATE: NORMAL
SPECIMEN SOURCE CVX/VAG CYTO: NORMAL
STAT OF ADQ CVX/VAG CYTO-IMP: NORMAL

## 2021-03-30 ENCOUNTER — PATIENT MESSAGE (OUTPATIENT)
Dept: NEUROLOGY | Facility: CLINIC | Age: 31
End: 2021-03-30

## 2021-04-05 ENCOUNTER — LAB VISIT (OUTPATIENT)
Dept: LAB | Facility: HOSPITAL | Age: 31
End: 2021-04-05
Attending: PSYCHIATRY & NEUROLOGY
Payer: COMMERCIAL

## 2021-04-05 DIAGNOSIS — E55.9 VITAMIN D DEFICIENCY: ICD-10-CM

## 2021-04-05 DIAGNOSIS — G35 MULTIPLE SCLEROSIS: ICD-10-CM

## 2021-04-05 LAB
ALBUMIN SERPL BCP-MCNC: 3.7 G/DL (ref 3.5–5.2)
ALP SERPL-CCNC: 26 U/L (ref 55–135)
ALT SERPL W/O P-5'-P-CCNC: 29 U/L (ref 10–44)
ANION GAP SERPL CALC-SCNC: 7 MMOL/L (ref 8–16)
AST SERPL-CCNC: 20 U/L (ref 10–40)
BASOPHILS # BLD AUTO: 0.06 K/UL (ref 0–0.2)
BASOPHILS NFR BLD: 0.8 % (ref 0–1.9)
BILIRUB SERPL-MCNC: 0.6 MG/DL (ref 0.1–1)
BUN SERPL-MCNC: 12 MG/DL (ref 6–20)
CALCIUM SERPL-MCNC: 9.1 MG/DL (ref 8.7–10.5)
CHLORIDE SERPL-SCNC: 105 MMOL/L (ref 95–110)
CO2 SERPL-SCNC: 26 MMOL/L (ref 23–29)
CREAT SERPL-MCNC: 0.7 MG/DL (ref 0.5–1.4)
DIFFERENTIAL METHOD: ABNORMAL
EOSINOPHIL # BLD AUTO: 0.1 K/UL (ref 0–0.5)
EOSINOPHIL NFR BLD: 1.8 % (ref 0–8)
ERYTHROCYTE [DISTWIDTH] IN BLOOD BY AUTOMATED COUNT: 13.2 % (ref 11.5–14.5)
EST. GFR  (AFRICAN AMERICAN): >60 ML/MIN/1.73 M^2
EST. GFR  (NON AFRICAN AMERICAN): >60 ML/MIN/1.73 M^2
GLUCOSE SERPL-MCNC: 79 MG/DL (ref 70–110)
HCT VFR BLD AUTO: 42.2 % (ref 37–48.5)
HGB BLD-MCNC: 13.4 G/DL (ref 12–16)
IMM GRANULOCYTES # BLD AUTO: 0.02 K/UL (ref 0–0.04)
IMM GRANULOCYTES NFR BLD AUTO: 0.3 % (ref 0–0.5)
LYMPHOCYTES # BLD AUTO: 2.6 K/UL (ref 1–4.8)
LYMPHOCYTES NFR BLD: 34.6 % (ref 18–48)
MCH RBC QN AUTO: 30.9 PG (ref 27–31)
MCHC RBC AUTO-ENTMCNC: 31.8 G/DL (ref 32–36)
MCV RBC AUTO: 98 FL (ref 82–98)
MONOCYTES # BLD AUTO: 0.5 K/UL (ref 0.3–1)
MONOCYTES NFR BLD: 7 % (ref 4–15)
NEUTROPHILS # BLD AUTO: 4.2 K/UL (ref 1.8–7.7)
NEUTROPHILS NFR BLD: 55.5 % (ref 38–73)
NRBC BLD-RTO: 0 /100 WBC
PLATELET # BLD AUTO: 349 K/UL (ref 150–450)
PMV BLD AUTO: 10.3 FL (ref 9.2–12.9)
POTASSIUM SERPL-SCNC: 4.1 MMOL/L (ref 3.5–5.1)
PROT SERPL-MCNC: 7.5 G/DL (ref 6–8.4)
RBC # BLD AUTO: 4.33 M/UL (ref 4–5.4)
SODIUM SERPL-SCNC: 138 MMOL/L (ref 136–145)
WBC # BLD AUTO: 7.58 K/UL (ref 3.9–12.7)

## 2021-04-05 PROCEDURE — 86360 T CELL ABSOLUTE COUNT/RATIO: CPT | Performed by: PSYCHIATRY & NEUROLOGY

## 2021-04-05 PROCEDURE — 36415 COLL VENOUS BLD VENIPUNCTURE: CPT | Mod: PO | Performed by: PSYCHIATRY & NEUROLOGY

## 2021-04-05 PROCEDURE — 85025 COMPLETE CBC W/AUTO DIFF WBC: CPT | Performed by: PSYCHIATRY & NEUROLOGY

## 2021-04-05 PROCEDURE — 80053 COMPREHEN METABOLIC PANEL: CPT | Performed by: PSYCHIATRY & NEUROLOGY

## 2021-04-05 PROCEDURE — 82306 VITAMIN D 25 HYDROXY: CPT | Performed by: PSYCHIATRY & NEUROLOGY

## 2021-04-05 PROCEDURE — 86359 T CELLS TOTAL COUNT: CPT | Performed by: PSYCHIATRY & NEUROLOGY

## 2021-04-06 LAB
25(OH)D3+25(OH)D2 SERPL-MCNC: 82 NG/ML (ref 30–96)
ABSOLUTE CD3: 1956 CELLS/UL (ref 700–2100)
ABSOLUTE CD8: 706 CELLS/UL (ref 200–900)
CD3%: 74.7 % (ref 55–83)
CD3+CD4+ CELLS # BLD: 1246 CELLS/UL (ref 300–1400)
CD3+CD4+ CELLS NFR BLD: 47.6 % (ref 28–57)
CD4/CD8 RATIO: 1.76 (ref 0.9–3.6)
CD8 % SUPPRESSOR T CELL: 27 % (ref 10–39)

## 2021-04-08 ENCOUNTER — HOSPITAL ENCOUNTER (OUTPATIENT)
Dept: RADIOLOGY | Facility: HOSPITAL | Age: 31
Discharge: HOME | End: 2021-04-08
Attending: PSYCHIATRY & NEUROLOGY
Payer: COMMERCIAL

## 2021-04-08 DIAGNOSIS — M54.9 DORSALGIA, UNSPECIFIED: ICD-10-CM

## 2021-04-08 PROCEDURE — 72148 MRI LUMBAR SPINE W/O DYE: CPT | Mod: TC

## 2021-04-08 PROCEDURE — 72148 MRI LUMBAR SPINE W/O DYE: CPT | Mod: 26,,, | Performed by: RADIOLOGY

## 2021-04-08 PROCEDURE — 72148 MRI LUMBAR SPINE WITHOUT CONTRAST: ICD-10-PCS | Mod: 26,,, | Performed by: RADIOLOGY

## 2021-04-12 ENCOUNTER — OFFICE VISIT (OUTPATIENT)
Dept: NEUROLOGY | Facility: CLINIC | Age: 31
End: 2021-04-12
Payer: COMMERCIAL

## 2021-04-12 VITALS
DIASTOLIC BLOOD PRESSURE: 75 MMHG | HEIGHT: 60 IN | BODY MASS INDEX: 31.86 KG/M2 | SYSTOLIC BLOOD PRESSURE: 113 MMHG | HEART RATE: 103 BPM | WEIGHT: 162.25 LBS

## 2021-04-12 DIAGNOSIS — M51.26 DISC DISPLACEMENT, LUMBAR: ICD-10-CM

## 2021-04-12 DIAGNOSIS — G89.29 CHRONIC RIGHT-SIDED LOW BACK PAIN WITH RIGHT-SIDED SCIATICA: ICD-10-CM

## 2021-04-12 DIAGNOSIS — M54.41 CHRONIC RIGHT-SIDED LOW BACK PAIN WITH RIGHT-SIDED SCIATICA: ICD-10-CM

## 2021-04-12 DIAGNOSIS — E67.8 HYPERVITAMINOSIS: ICD-10-CM

## 2021-04-12 DIAGNOSIS — G35 MULTIPLE SCLEROSIS: Primary | ICD-10-CM

## 2021-04-12 PROCEDURE — 3008F PR BODY MASS INDEX (BMI) DOCUMENTED: ICD-10-PCS | Mod: CPTII,S$GLB,, | Performed by: PSYCHIATRY & NEUROLOGY

## 2021-04-12 PROCEDURE — 99999 PR PBB SHADOW E&M-EST. PATIENT-LVL IV: CPT | Mod: PBBFAC,,, | Performed by: PSYCHIATRY & NEUROLOGY

## 2021-04-12 PROCEDURE — 99999 PR PBB SHADOW E&M-EST. PATIENT-LVL IV: ICD-10-PCS | Mod: PBBFAC,,, | Performed by: PSYCHIATRY & NEUROLOGY

## 2021-04-12 PROCEDURE — 1125F PR PAIN SEVERITY QUANTIFIED, PAIN PRESENT: ICD-10-PCS | Mod: S$GLB,,, | Performed by: PSYCHIATRY & NEUROLOGY

## 2021-04-12 PROCEDURE — 3008F BODY MASS INDEX DOCD: CPT | Mod: CPTII,S$GLB,, | Performed by: PSYCHIATRY & NEUROLOGY

## 2021-04-12 PROCEDURE — 99215 PR OFFICE/OUTPT VISIT, EST, LEVL V, 40-54 MIN: ICD-10-PCS | Mod: S$GLB,,, | Performed by: PSYCHIATRY & NEUROLOGY

## 2021-04-12 PROCEDURE — 99215 OFFICE O/P EST HI 40 MIN: CPT | Mod: S$GLB,,, | Performed by: PSYCHIATRY & NEUROLOGY

## 2021-04-12 PROCEDURE — 1125F AMNT PAIN NOTED PAIN PRSNT: CPT | Mod: S$GLB,,, | Performed by: PSYCHIATRY & NEUROLOGY

## 2021-04-12 RX ORDER — METHYLPREDNISOLONE 4 MG/1
TABLET ORAL
Qty: 21 TABLET | Refills: 0 | Status: SHIPPED | OUTPATIENT
Start: 2021-04-12 | End: 2021-05-03

## 2021-04-12 RX ORDER — GABAPENTIN 600 MG/1
300 TABLET ORAL 3 TIMES DAILY
Qty: 45 TABLET | Refills: 3 | Status: SHIPPED | OUTPATIENT
Start: 2021-04-12 | End: 2021-05-14 | Stop reason: ALTCHOICE

## 2021-04-13 ENCOUNTER — DOCUMENTATION ONLY (OUTPATIENT)
Dept: REHABILITATION | Facility: HOSPITAL | Age: 31
End: 2021-04-13

## 2021-04-13 DIAGNOSIS — H81.4 VERTIGO OF CENTRAL ORIGIN: Primary | ICD-10-CM

## 2021-04-14 ENCOUNTER — SPECIALTY PHARMACY (OUTPATIENT)
Dept: PHARMACY | Facility: CLINIC | Age: 31
End: 2021-04-14

## 2021-04-19 ENCOUNTER — PATIENT MESSAGE (OUTPATIENT)
Dept: NEUROLOGY | Facility: CLINIC | Age: 31
End: 2021-04-19

## 2021-04-19 DIAGNOSIS — M54.41 CHRONIC RIGHT-SIDED LOW BACK PAIN WITH RIGHT-SIDED SCIATICA: Primary | ICD-10-CM

## 2021-04-19 DIAGNOSIS — G89.29 CHRONIC RIGHT-SIDED LOW BACK PAIN WITH RIGHT-SIDED SCIATICA: Primary | ICD-10-CM

## 2021-04-20 ENCOUNTER — PATIENT MESSAGE (OUTPATIENT)
Dept: NEUROLOGY | Facility: CLINIC | Age: 31
End: 2021-04-20

## 2021-04-20 RX ORDER — MELOXICAM 7.5 MG/1
7.5 TABLET ORAL DAILY
Qty: 14 TABLET | Refills: 0 | Status: SHIPPED | OUTPATIENT
Start: 2021-04-20 | End: 2021-09-21

## 2021-04-21 ENCOUNTER — PATIENT MESSAGE (OUTPATIENT)
Dept: PHARMACY | Facility: CLINIC | Age: 31
End: 2021-04-21

## 2021-05-04 ENCOUNTER — PATIENT MESSAGE (OUTPATIENT)
Dept: NEUROLOGY | Facility: CLINIC | Age: 31
End: 2021-05-04

## 2021-05-07 ENCOUNTER — PATIENT MESSAGE (OUTPATIENT)
Dept: NEUROLOGY | Facility: CLINIC | Age: 31
End: 2021-05-07

## 2021-05-07 DIAGNOSIS — M54.41 CHRONIC RIGHT-SIDED LOW BACK PAIN WITH RIGHT-SIDED SCIATICA: ICD-10-CM

## 2021-05-07 DIAGNOSIS — G89.29 CHRONIC RIGHT-SIDED LOW BACK PAIN WITH RIGHT-SIDED SCIATICA: ICD-10-CM

## 2021-05-07 DIAGNOSIS — M79.2 NEUROPATHIC PAIN: ICD-10-CM

## 2021-05-07 DIAGNOSIS — G35 MULTIPLE SCLEROSIS: Primary | ICD-10-CM

## 2021-05-07 RX ORDER — PREGABALIN 100 MG/1
100 CAPSULE ORAL 2 TIMES DAILY
Qty: 60 CAPSULE | Refills: 6 | Status: SHIPPED | OUTPATIENT
Start: 2021-05-07 | End: 2021-09-21

## 2021-05-11 ENCOUNTER — PATIENT OUTREACH (OUTPATIENT)
Dept: ADMINISTRATIVE | Facility: OTHER | Age: 31
End: 2021-05-11

## 2021-05-12 DIAGNOSIS — G35 MULTIPLE SCLEROSIS: ICD-10-CM

## 2021-05-13 RX ORDER — DIMETHYL FUMARATE 240 MG/1
240 CAPSULE ORAL 2 TIMES DAILY
Qty: 60 CAPSULE | Refills: 2 | Status: SHIPPED | OUTPATIENT
Start: 2021-05-13 | End: 2021-09-21

## 2021-05-14 ENCOUNTER — SPECIALTY PHARMACY (OUTPATIENT)
Dept: PHARMACY | Facility: CLINIC | Age: 31
End: 2021-05-14

## 2021-05-14 DIAGNOSIS — G35 MULTIPLE SCLEROSIS: Primary | ICD-10-CM

## 2021-05-18 ENCOUNTER — PATIENT MESSAGE (OUTPATIENT)
Dept: ORTHOPEDICS | Facility: CLINIC | Age: 31
End: 2021-05-18

## 2021-05-25 ENCOUNTER — PATIENT MESSAGE (OUTPATIENT)
Dept: ORTHOPEDICS | Facility: CLINIC | Age: 31
End: 2021-05-25

## 2021-05-31 ENCOUNTER — PATIENT MESSAGE (OUTPATIENT)
Dept: PSYCHIATRY | Facility: CLINIC | Age: 31
End: 2021-05-31

## 2021-06-14 ENCOUNTER — SPECIALTY PHARMACY (OUTPATIENT)
Dept: PHARMACY | Facility: CLINIC | Age: 31
End: 2021-06-14

## 2021-06-18 ENCOUNTER — SPECIALTY PHARMACY (OUTPATIENT)
Dept: PHARMACY | Facility: CLINIC | Age: 31
End: 2021-06-18

## 2021-06-21 DIAGNOSIS — Z30.9 ENCOUNTER FOR CONTRACEPTIVE MANAGEMENT, UNSPECIFIED TYPE: ICD-10-CM

## 2021-06-21 RX ORDER — NORETHINDRONE ACETATE AND ETHINYL ESTRADIOL, ETHINYL ESTRADIOL AND FERROUS FUMARATE 1MG-10(24)
1 KIT ORAL DAILY
Qty: 84 TABLET | Refills: 4 | Status: SHIPPED | OUTPATIENT
Start: 2021-06-21 | End: 2022-08-02

## 2021-07-06 ENCOUNTER — HOSPITAL ENCOUNTER (OUTPATIENT)
Dept: RADIOLOGY | Facility: HOSPITAL | Age: 31
Discharge: HOME | End: 2021-07-06
Attending: PSYCHIATRY & NEUROLOGY
Payer: COMMERCIAL

## 2021-07-06 DIAGNOSIS — G35 MULTIPLE SCLEROSIS: ICD-10-CM

## 2021-07-06 PROCEDURE — 25500020 PHARM REV CODE 255: Performed by: PSYCHIATRY & NEUROLOGY

## 2021-07-06 PROCEDURE — A9585 GADOBUTROL INJECTION: HCPCS | Performed by: PSYCHIATRY & NEUROLOGY

## 2021-07-06 PROCEDURE — 72156 MRI NECK SPINE W/O & W/DYE: CPT | Mod: 26,,, | Performed by: RADIOLOGY

## 2021-07-06 PROCEDURE — 72156 MRI CERVICAL SPINE DEMYELINATING W W/O CONTRAST: ICD-10-PCS | Mod: 26,,, | Performed by: RADIOLOGY

## 2021-07-06 PROCEDURE — 70553 MRI BRAIN STEM W/O & W/DYE: CPT | Mod: TC

## 2021-07-06 PROCEDURE — 70553 MRI BRAIN DEMYELINATING W/ WO CONTRAST: ICD-10-PCS | Mod: 26,,, | Performed by: RADIOLOGY

## 2021-07-06 PROCEDURE — 72156 MRI NECK SPINE W/O & W/DYE: CPT | Mod: TC

## 2021-07-06 PROCEDURE — 70553 MRI BRAIN STEM W/O & W/DYE: CPT | Mod: 26,,, | Performed by: RADIOLOGY

## 2021-07-06 RX ORDER — GADOBUTROL 604.72 MG/ML
8 INJECTION INTRAVENOUS
Status: COMPLETED | OUTPATIENT
Start: 2021-07-06 | End: 2021-07-06

## 2021-07-06 RX ADMIN — GADOBUTROL 8 ML: 604.72 INJECTION INTRAVENOUS at 06:07

## 2021-07-07 ENCOUNTER — TELEPHONE (OUTPATIENT)
Dept: NEUROLOGY | Facility: CLINIC | Age: 31
End: 2021-07-07

## 2021-07-07 DIAGNOSIS — Z13.9 SCREENING FOR CONDITION: ICD-10-CM

## 2021-07-07 DIAGNOSIS — G35 MULTIPLE SCLEROSIS: Primary | ICD-10-CM

## 2021-07-07 DIAGNOSIS — Z79.899 ENCOUNTER FOR MONITORING IMMUNOMODULATING THERAPY: ICD-10-CM

## 2021-07-07 DIAGNOSIS — Z51.81 ENCOUNTER FOR MONITORING IMMUNOMODULATING THERAPY: ICD-10-CM

## 2021-07-09 ENCOUNTER — LAB VISIT (OUTPATIENT)
Dept: LAB | Facility: HOSPITAL | Age: 31
End: 2021-07-09
Attending: PSYCHIATRY & NEUROLOGY
Payer: COMMERCIAL

## 2021-07-09 DIAGNOSIS — Z79.899 ENCOUNTER FOR MONITORING IMMUNOMODULATING THERAPY: ICD-10-CM

## 2021-07-09 DIAGNOSIS — G35 MULTIPLE SCLEROSIS: ICD-10-CM

## 2021-07-09 DIAGNOSIS — Z51.81 ENCOUNTER FOR MONITORING IMMUNOMODULATING THERAPY: ICD-10-CM

## 2021-07-09 PROCEDURE — 86480 TB TEST CELL IMMUN MEASURE: CPT | Performed by: PSYCHIATRY & NEUROLOGY

## 2021-07-12 ENCOUNTER — SPECIALTY PHARMACY (OUTPATIENT)
Dept: PHARMACY | Facility: CLINIC | Age: 31
End: 2021-07-12

## 2021-07-12 LAB
GAMMA INTERFERON BACKGROUND BLD IA-ACNC: 0.08 IU/ML
M TB IFN-G CD4+ BCKGRND COR BLD-ACNC: -0.01 IU/ML
MITOGEN IGNF BCKGRD COR BLD-ACNC: >10 IU/ML
TB GOLD PLUS: NEGATIVE
TB2 - NIL: 0.02 IU/ML

## 2021-07-15 ENCOUNTER — PATIENT MESSAGE (OUTPATIENT)
Dept: INTERNAL MEDICINE | Facility: CLINIC | Age: 31
End: 2021-07-15

## 2021-07-15 ENCOUNTER — OFFICE VISIT (OUTPATIENT)
Dept: NEUROLOGY | Facility: CLINIC | Age: 31
End: 2021-07-15
Payer: COMMERCIAL

## 2021-07-15 VITALS
BODY MASS INDEX: 32.51 KG/M2 | WEIGHT: 165.56 LBS | SYSTOLIC BLOOD PRESSURE: 104 MMHG | DIASTOLIC BLOOD PRESSURE: 69 MMHG | HEART RATE: 85 BPM | HEIGHT: 60 IN

## 2021-07-15 DIAGNOSIS — G35 MULTIPLE SCLEROSIS: Primary | ICD-10-CM

## 2021-07-15 PROCEDURE — 3078F PR MOST RECENT DIASTOLIC BLOOD PRESSURE < 80 MM HG: ICD-10-PCS | Mod: CPTII,S$GLB,, | Performed by: PSYCHIATRY & NEUROLOGY

## 2021-07-15 PROCEDURE — 3078F DIAST BP <80 MM HG: CPT | Mod: CPTII,S$GLB,, | Performed by: PSYCHIATRY & NEUROLOGY

## 2021-07-15 PROCEDURE — 3074F PR MOST RECENT SYSTOLIC BLOOD PRESSURE < 130 MM HG: ICD-10-PCS | Mod: CPTII,S$GLB,, | Performed by: PSYCHIATRY & NEUROLOGY

## 2021-07-15 PROCEDURE — 99215 OFFICE O/P EST HI 40 MIN: CPT | Mod: S$GLB,,, | Performed by: PSYCHIATRY & NEUROLOGY

## 2021-07-15 PROCEDURE — 3074F SYST BP LT 130 MM HG: CPT | Mod: CPTII,S$GLB,, | Performed by: PSYCHIATRY & NEUROLOGY

## 2021-07-15 PROCEDURE — 3008F BODY MASS INDEX DOCD: CPT | Mod: CPTII,S$GLB,, | Performed by: PSYCHIATRY & NEUROLOGY

## 2021-07-15 PROCEDURE — 99999 PR PBB SHADOW E&M-EST. PATIENT-LVL III: CPT | Mod: PBBFAC,,, | Performed by: PSYCHIATRY & NEUROLOGY

## 2021-07-15 PROCEDURE — 99215 PR OFFICE/OUTPT VISIT, EST, LEVL V, 40-54 MIN: ICD-10-PCS | Mod: S$GLB,,, | Performed by: PSYCHIATRY & NEUROLOGY

## 2021-07-15 PROCEDURE — 99999 PR PBB SHADOW E&M-EST. PATIENT-LVL III: ICD-10-PCS | Mod: PBBFAC,,, | Performed by: PSYCHIATRY & NEUROLOGY

## 2021-07-15 PROCEDURE — 3008F PR BODY MASS INDEX (BMI) DOCUMENTED: ICD-10-PCS | Mod: CPTII,S$GLB,, | Performed by: PSYCHIATRY & NEUROLOGY

## 2021-07-15 RX ORDER — GABAPENTIN 600 MG/1
600 TABLET ORAL NIGHTLY
COMMUNITY
Start: 2021-06-12 | End: 2023-08-18 | Stop reason: CLARIF

## 2021-07-19 ENCOUNTER — SPECIALTY PHARMACY (OUTPATIENT)
Dept: PHARMACY | Facility: CLINIC | Age: 31
End: 2021-07-19

## 2021-08-11 DIAGNOSIS — G35 MULTIPLE SCLEROSIS: ICD-10-CM

## 2021-08-11 RX ORDER — DIMETHYL FUMARATE 240 MG/1
240 CAPSULE ORAL 2 TIMES DAILY
Qty: 60 CAPSULE | Refills: 2 | Status: CANCELLED | OUTPATIENT
Start: 2021-08-11

## 2021-08-13 DIAGNOSIS — G35 MULTIPLE SCLEROSIS: ICD-10-CM

## 2021-08-13 RX ORDER — DIMETHYL FUMARATE 240 MG/1
240 CAPSULE ORAL 2 TIMES DAILY
Qty: 60 CAPSULE | Refills: 2 | OUTPATIENT
Start: 2021-08-13

## 2021-08-17 ENCOUNTER — PATIENT MESSAGE (OUTPATIENT)
Dept: NEUROLOGY | Facility: CLINIC | Age: 31
End: 2021-08-17

## 2021-08-18 ENCOUNTER — SPECIALTY PHARMACY (OUTPATIENT)
Dept: PHARMACY | Facility: CLINIC | Age: 31
End: 2021-08-18

## 2021-08-20 RX ORDER — EPINEPHRINE 0.3 MG/.3ML
0.3 INJECTION SUBCUTANEOUS
Status: CANCELLED | OUTPATIENT
Start: 2021-08-20

## 2021-08-20 RX ORDER — HEPARIN 100 UNIT/ML
500 SYRINGE INTRAVENOUS
Status: CANCELLED | OUTPATIENT
Start: 2021-08-20

## 2021-08-20 RX ORDER — ACETAMINOPHEN 500 MG
1000 TABLET ORAL
Status: CANCELLED | OUTPATIENT
Start: 2021-08-20

## 2021-08-20 RX ORDER — DIPHENHYDRAMINE HYDROCHLORIDE 50 MG/ML
50 INJECTION INTRAMUSCULAR; INTRAVENOUS
Status: CANCELLED | OUTPATIENT
Start: 2021-08-20

## 2021-08-20 RX ORDER — SODIUM CHLORIDE 0.9 % (FLUSH) 0.9 %
10 SYRINGE (ML) INJECTION
Status: CANCELLED | OUTPATIENT
Start: 2021-08-20

## 2021-08-20 RX ORDER — FAMOTIDINE 10 MG/ML
20 INJECTION INTRAVENOUS
Status: CANCELLED | OUTPATIENT
Start: 2021-08-20

## 2021-09-01 ENCOUNTER — PATIENT MESSAGE (OUTPATIENT)
Dept: PSYCHIATRY | Facility: CLINIC | Age: 31
End: 2021-09-01

## 2021-09-01 ENCOUNTER — PATIENT MESSAGE (OUTPATIENT)
Dept: NEUROLOGY | Facility: CLINIC | Age: 31
End: 2021-09-01

## 2021-09-02 ENCOUNTER — PATIENT MESSAGE (OUTPATIENT)
Dept: PSYCHIATRY | Facility: CLINIC | Age: 31
End: 2021-09-02

## 2021-09-04 ENCOUNTER — PATIENT MESSAGE (OUTPATIENT)
Dept: NEUROLOGY | Facility: CLINIC | Age: 31
End: 2021-09-04

## 2021-09-07 ENCOUNTER — PATIENT MESSAGE (OUTPATIENT)
Dept: NEUROLOGY | Facility: CLINIC | Age: 31
End: 2021-09-07

## 2021-09-14 ENCOUNTER — INFUSION (OUTPATIENT)
Dept: INFUSION THERAPY | Facility: HOSPITAL | Age: 31
End: 2021-09-14
Payer: COMMERCIAL

## 2021-09-14 VITALS
DIASTOLIC BLOOD PRESSURE: 57 MMHG | OXYGEN SATURATION: 99 % | TEMPERATURE: 98 F | HEART RATE: 95 BPM | BODY MASS INDEX: 33.72 KG/M2 | WEIGHT: 171.75 LBS | HEIGHT: 60 IN | SYSTOLIC BLOOD PRESSURE: 127 MMHG | RESPIRATION RATE: 16 BRPM

## 2021-09-14 DIAGNOSIS — G35 MULTIPLE SCLEROSIS: Primary | ICD-10-CM

## 2021-09-14 PROCEDURE — 96375 TX/PRO/DX INJ NEW DRUG ADDON: CPT

## 2021-09-14 PROCEDURE — 96367 TX/PROPH/DG ADDL SEQ IV INF: CPT

## 2021-09-14 PROCEDURE — 63600175 PHARM REV CODE 636 W HCPCS: Mod: JG | Performed by: PSYCHIATRY & NEUROLOGY

## 2021-09-14 PROCEDURE — 25000003 PHARM REV CODE 250: Performed by: PSYCHIATRY & NEUROLOGY

## 2021-09-14 PROCEDURE — 96415 CHEMO IV INFUSION ADDL HR: CPT

## 2021-09-14 PROCEDURE — 96413 CHEMO IV INFUSION 1 HR: CPT

## 2021-09-14 RX ORDER — FAMOTIDINE 10 MG/ML
20 INJECTION INTRAVENOUS
Status: COMPLETED | OUTPATIENT
Start: 2021-09-14 | End: 2021-09-14

## 2021-09-14 RX ORDER — SODIUM CHLORIDE 0.9 % (FLUSH) 0.9 %
10 SYRINGE (ML) INJECTION
Status: CANCELLED | OUTPATIENT
Start: 2021-09-28

## 2021-09-14 RX ORDER — HEPARIN 100 UNIT/ML
500 SYRINGE INTRAVENOUS
Status: DISCONTINUED | OUTPATIENT
Start: 2021-09-14 | End: 2021-09-14 | Stop reason: HOSPADM

## 2021-09-14 RX ORDER — DIPHENHYDRAMINE HYDROCHLORIDE 50 MG/ML
50 INJECTION INTRAMUSCULAR; INTRAVENOUS
Status: CANCELLED | OUTPATIENT
Start: 2021-09-28

## 2021-09-14 RX ORDER — ACETAMINOPHEN 500 MG
1000 TABLET ORAL
Status: CANCELLED | OUTPATIENT
Start: 2021-09-28

## 2021-09-14 RX ORDER — EPINEPHRINE 0.3 MG/.3ML
0.3 INJECTION SUBCUTANEOUS
Status: CANCELLED | OUTPATIENT
Start: 2021-09-28

## 2021-09-14 RX ORDER — FAMOTIDINE 10 MG/ML
20 INJECTION INTRAVENOUS
Status: CANCELLED | OUTPATIENT
Start: 2021-09-28

## 2021-09-14 RX ORDER — DIPHENHYDRAMINE HYDROCHLORIDE 50 MG/ML
50 INJECTION INTRAMUSCULAR; INTRAVENOUS
Status: DISCONTINUED | OUTPATIENT
Start: 2021-09-14 | End: 2021-09-14 | Stop reason: HOSPADM

## 2021-09-14 RX ORDER — SODIUM CHLORIDE 0.9 % (FLUSH) 0.9 %
10 SYRINGE (ML) INJECTION
Status: DISCONTINUED | OUTPATIENT
Start: 2021-09-14 | End: 2021-09-14 | Stop reason: HOSPADM

## 2021-09-14 RX ORDER — ACETAMINOPHEN 500 MG
1000 TABLET ORAL
Status: COMPLETED | OUTPATIENT
Start: 2021-09-14 | End: 2021-09-14

## 2021-09-14 RX ORDER — EPINEPHRINE 0.3 MG/.3ML
0.3 INJECTION SUBCUTANEOUS
Status: DISCONTINUED | OUTPATIENT
Start: 2021-09-14 | End: 2021-09-14 | Stop reason: HOSPADM

## 2021-09-14 RX ORDER — HEPARIN 100 UNIT/ML
500 SYRINGE INTRAVENOUS
Status: CANCELLED | OUTPATIENT
Start: 2021-09-28

## 2021-09-14 RX ADMIN — ACETAMINOPHEN 1000 MG: 500 TABLET ORAL at 08:09

## 2021-09-14 RX ADMIN — DIPHENHYDRAMINE HYDROCHLORIDE 50 MG: 50 INJECTION, SOLUTION INTRAMUSCULAR; INTRAVENOUS at 09:09

## 2021-09-14 RX ADMIN — FAMOTIDINE 20 MG: 10 INJECTION INTRAVENOUS at 09:09

## 2021-09-14 RX ADMIN — DEXTROSE 100 MG: 50 INJECTION, SOLUTION INTRAVENOUS at 08:09

## 2021-09-14 RX ADMIN — SODIUM CHLORIDE: 0.9 INJECTION, SOLUTION INTRAVENOUS at 08:09

## 2021-09-14 RX ADMIN — OCRELIZUMAB 300 MG: 300 INJECTION INTRAVENOUS at 09:09

## 2021-09-21 ENCOUNTER — OFFICE VISIT (OUTPATIENT)
Dept: NEUROLOGY | Facility: CLINIC | Age: 31
End: 2021-09-21
Payer: COMMERCIAL

## 2021-09-21 VITALS
HEART RATE: 91 BPM | SYSTOLIC BLOOD PRESSURE: 100 MMHG | HEIGHT: 60 IN | WEIGHT: 169.44 LBS | BODY MASS INDEX: 33.27 KG/M2 | DIASTOLIC BLOOD PRESSURE: 61 MMHG

## 2021-09-21 DIAGNOSIS — G35 MULTIPLE SCLEROSIS: Primary | ICD-10-CM

## 2021-09-21 DIAGNOSIS — Z79.899 ENCOUNTER FOR MONITORING IMMUNOMODULATING THERAPY: ICD-10-CM

## 2021-09-21 DIAGNOSIS — Z51.81 ENCOUNTER FOR MONITORING IMMUNOMODULATING THERAPY: ICD-10-CM

## 2021-09-21 PROCEDURE — 3078F DIAST BP <80 MM HG: CPT | Mod: CPTII,S$GLB,, | Performed by: PSYCHIATRY & NEUROLOGY

## 2021-09-21 PROCEDURE — 99215 PR OFFICE/OUTPT VISIT, EST, LEVL V, 40-54 MIN: ICD-10-PCS | Mod: S$GLB,,, | Performed by: PSYCHIATRY & NEUROLOGY

## 2021-09-21 PROCEDURE — 3008F PR BODY MASS INDEX (BMI) DOCUMENTED: ICD-10-PCS | Mod: CPTII,S$GLB,, | Performed by: PSYCHIATRY & NEUROLOGY

## 2021-09-21 PROCEDURE — 99999 PR PBB SHADOW E&M-EST. PATIENT-LVL III: CPT | Mod: PBBFAC,,, | Performed by: PSYCHIATRY & NEUROLOGY

## 2021-09-21 PROCEDURE — 1159F MED LIST DOCD IN RCRD: CPT | Mod: CPTII,S$GLB,, | Performed by: PSYCHIATRY & NEUROLOGY

## 2021-09-21 PROCEDURE — 1159F PR MEDICATION LIST DOCUMENTED IN MEDICAL RECORD: ICD-10-PCS | Mod: CPTII,S$GLB,, | Performed by: PSYCHIATRY & NEUROLOGY

## 2021-09-21 PROCEDURE — 3074F SYST BP LT 130 MM HG: CPT | Mod: CPTII,S$GLB,, | Performed by: PSYCHIATRY & NEUROLOGY

## 2021-09-21 PROCEDURE — 99215 OFFICE O/P EST HI 40 MIN: CPT | Mod: S$GLB,,, | Performed by: PSYCHIATRY & NEUROLOGY

## 2021-09-21 PROCEDURE — 3008F BODY MASS INDEX DOCD: CPT | Mod: CPTII,S$GLB,, | Performed by: PSYCHIATRY & NEUROLOGY

## 2021-09-21 PROCEDURE — 99999 PR PBB SHADOW E&M-EST. PATIENT-LVL III: ICD-10-PCS | Mod: PBBFAC,,, | Performed by: PSYCHIATRY & NEUROLOGY

## 2021-09-21 PROCEDURE — 3074F PR MOST RECENT SYSTOLIC BLOOD PRESSURE < 130 MM HG: ICD-10-PCS | Mod: CPTII,S$GLB,, | Performed by: PSYCHIATRY & NEUROLOGY

## 2021-09-21 PROCEDURE — 3078F PR MOST RECENT DIASTOLIC BLOOD PRESSURE < 80 MM HG: ICD-10-PCS | Mod: CPTII,S$GLB,, | Performed by: PSYCHIATRY & NEUROLOGY

## 2021-09-28 ENCOUNTER — INFUSION (OUTPATIENT)
Dept: INFUSION THERAPY | Facility: HOSPITAL | Age: 31
End: 2021-09-28
Payer: COMMERCIAL

## 2021-09-28 VITALS
RESPIRATION RATE: 18 BRPM | HEART RATE: 102 BPM | TEMPERATURE: 99 F | SYSTOLIC BLOOD PRESSURE: 120 MMHG | WEIGHT: 169.31 LBS | BODY MASS INDEX: 33.24 KG/M2 | HEIGHT: 60 IN | DIASTOLIC BLOOD PRESSURE: 72 MMHG

## 2021-09-28 DIAGNOSIS — G35 MULTIPLE SCLEROSIS: Primary | ICD-10-CM

## 2021-09-28 PROCEDURE — 96367 TX/PROPH/DG ADDL SEQ IV INF: CPT

## 2021-09-28 PROCEDURE — 25000003 PHARM REV CODE 250: Performed by: PSYCHIATRY & NEUROLOGY

## 2021-09-28 PROCEDURE — 96376 TX/PRO/DX INJ SAME DRUG ADON: CPT

## 2021-09-28 PROCEDURE — 96415 CHEMO IV INFUSION ADDL HR: CPT

## 2021-09-28 PROCEDURE — 96413 CHEMO IV INFUSION 1 HR: CPT

## 2021-09-28 PROCEDURE — 63600175 PHARM REV CODE 636 W HCPCS: Mod: JG | Performed by: PSYCHIATRY & NEUROLOGY

## 2021-09-28 RX ORDER — FAMOTIDINE 10 MG/ML
20 INJECTION INTRAVENOUS
Status: COMPLETED | OUTPATIENT
Start: 2021-09-28 | End: 2021-09-28

## 2021-09-28 RX ORDER — DIPHENHYDRAMINE HYDROCHLORIDE 50 MG/ML
50 INJECTION INTRAMUSCULAR; INTRAVENOUS
Status: CANCELLED | OUTPATIENT
Start: 2021-09-28

## 2021-09-28 RX ORDER — SODIUM CHLORIDE 0.9 % (FLUSH) 0.9 %
10 SYRINGE (ML) INJECTION
Status: CANCELLED | OUTPATIENT
Start: 2021-09-28

## 2021-09-28 RX ORDER — DIPHENHYDRAMINE HYDROCHLORIDE 50 MG/ML
50 INJECTION INTRAMUSCULAR; INTRAVENOUS
Status: DISCONTINUED | OUTPATIENT
Start: 2021-09-28 | End: 2021-09-28 | Stop reason: HOSPADM

## 2021-09-28 RX ORDER — ACETAMINOPHEN 500 MG
1000 TABLET ORAL
Status: COMPLETED | OUTPATIENT
Start: 2021-09-28 | End: 2021-09-28

## 2021-09-28 RX ORDER — ACETAMINOPHEN 500 MG
1000 TABLET ORAL
Status: CANCELLED | OUTPATIENT
Start: 2021-09-28

## 2021-09-28 RX ORDER — HEPARIN 100 UNIT/ML
500 SYRINGE INTRAVENOUS
Status: DISCONTINUED | OUTPATIENT
Start: 2021-09-28 | End: 2021-09-28 | Stop reason: HOSPADM

## 2021-09-28 RX ORDER — EPINEPHRINE 0.3 MG/.3ML
0.3 INJECTION SUBCUTANEOUS
Status: CANCELLED | OUTPATIENT
Start: 2021-09-28

## 2021-09-28 RX ORDER — EPINEPHRINE 0.3 MG/.3ML
0.3 INJECTION SUBCUTANEOUS
Status: DISCONTINUED | OUTPATIENT
Start: 2021-09-28 | End: 2021-09-28 | Stop reason: HOSPADM

## 2021-09-28 RX ORDER — SODIUM CHLORIDE 0.9 % (FLUSH) 0.9 %
10 SYRINGE (ML) INJECTION
Status: DISCONTINUED | OUTPATIENT
Start: 2021-09-28 | End: 2021-09-28 | Stop reason: HOSPADM

## 2021-09-28 RX ORDER — FAMOTIDINE 10 MG/ML
20 INJECTION INTRAVENOUS
Status: CANCELLED | OUTPATIENT
Start: 2021-09-28

## 2021-09-28 RX ORDER — HEPARIN 100 UNIT/ML
500 SYRINGE INTRAVENOUS
Status: CANCELLED | OUTPATIENT
Start: 2021-09-28

## 2021-09-28 RX ADMIN — DEXTROSE 100 MG: 50 INJECTION, SOLUTION INTRAVENOUS at 10:09

## 2021-09-28 RX ADMIN — DIPHENHYDRAMINE HYDROCHLORIDE 50 MG: 50 INJECTION INTRAMUSCULAR; INTRAVENOUS at 09:09

## 2021-09-28 RX ADMIN — ACETAMINOPHEN 1000 MG: 500 TABLET ORAL at 09:09

## 2021-09-28 RX ADMIN — FAMOTIDINE 20 MG: 10 INJECTION INTRAVENOUS at 09:09

## 2021-09-28 RX ADMIN — SODIUM CHLORIDE: 0.9 INJECTION, SOLUTION INTRAVENOUS at 09:09

## 2021-09-28 RX ADMIN — OCRELIZUMAB 300 MG: 300 INJECTION INTRAVENOUS at 11:09

## 2021-10-22 ENCOUNTER — IMMUNIZATION (OUTPATIENT)
Dept: PHARMACY | Facility: CLINIC | Age: 31
End: 2021-10-22
Payer: COMMERCIAL

## 2021-10-22 DIAGNOSIS — Z23 NEED FOR VACCINATION: Primary | ICD-10-CM

## 2021-12-09 ENCOUNTER — OFFICE VISIT (OUTPATIENT)
Dept: NEUROLOGY | Facility: CLINIC | Age: 31
End: 2021-12-09
Payer: COMMERCIAL

## 2021-12-09 VITALS
SYSTOLIC BLOOD PRESSURE: 103 MMHG | HEIGHT: 60 IN | WEIGHT: 172.63 LBS | DIASTOLIC BLOOD PRESSURE: 70 MMHG | BODY MASS INDEX: 33.89 KG/M2 | HEART RATE: 108 BPM

## 2021-12-09 DIAGNOSIS — G35 MULTIPLE SCLEROSIS: Primary | ICD-10-CM

## 2021-12-09 DIAGNOSIS — E55.9 VITAMIN D DEFICIENCY: ICD-10-CM

## 2021-12-09 DIAGNOSIS — G43.E09 CHRONIC MIGRAINE WITH AURA: ICD-10-CM

## 2021-12-09 PROCEDURE — 3008F BODY MASS INDEX DOCD: CPT | Mod: CPTII,S$GLB,, | Performed by: PSYCHIATRY & NEUROLOGY

## 2021-12-09 PROCEDURE — 3074F PR MOST RECENT SYSTOLIC BLOOD PRESSURE < 130 MM HG: ICD-10-PCS | Mod: CPTII,S$GLB,, | Performed by: PSYCHIATRY & NEUROLOGY

## 2021-12-09 PROCEDURE — 3078F PR MOST RECENT DIASTOLIC BLOOD PRESSURE < 80 MM HG: ICD-10-PCS | Mod: CPTII,S$GLB,, | Performed by: PSYCHIATRY & NEUROLOGY

## 2021-12-09 PROCEDURE — 99215 PR OFFICE/OUTPT VISIT, EST, LEVL V, 40-54 MIN: ICD-10-PCS | Mod: S$GLB,,, | Performed by: PSYCHIATRY & NEUROLOGY

## 2021-12-09 PROCEDURE — 3074F SYST BP LT 130 MM HG: CPT | Mod: CPTII,S$GLB,, | Performed by: PSYCHIATRY & NEUROLOGY

## 2021-12-09 PROCEDURE — 3008F PR BODY MASS INDEX (BMI) DOCUMENTED: ICD-10-PCS | Mod: CPTII,S$GLB,, | Performed by: PSYCHIATRY & NEUROLOGY

## 2021-12-09 PROCEDURE — 3078F DIAST BP <80 MM HG: CPT | Mod: CPTII,S$GLB,, | Performed by: PSYCHIATRY & NEUROLOGY

## 2021-12-09 PROCEDURE — 99999 PR PBB SHADOW E&M-EST. PATIENT-LVL III: CPT | Mod: PBBFAC,,, | Performed by: PSYCHIATRY & NEUROLOGY

## 2021-12-09 PROCEDURE — 99215 OFFICE O/P EST HI 40 MIN: CPT | Mod: S$GLB,,, | Performed by: PSYCHIATRY & NEUROLOGY

## 2021-12-09 PROCEDURE — 99999 PR PBB SHADOW E&M-EST. PATIENT-LVL III: ICD-10-PCS | Mod: PBBFAC,,, | Performed by: PSYCHIATRY & NEUROLOGY

## 2021-12-09 NOTE — PROGRESS NOTES
"Subjective:          Patient ID: Jessica Garcia is a 31 y.o. female who presents today for a routine clinic visit for MS.      MS HPI:  · DMT: ocrelizumab  · Side effects from DMT? No but she didn't go to sleep until 3am  · Taking vitamin D3 as recommended? Yes,   · No new s/s of relapse or worsening.  · Having worse migraine auras without headache. Right visual field affected with scotoma surrounded by "lightening" effect. Occurring more often, at least once every 2 weeks if not sooner.  · Also with worse MS symptoms.    Medications:  Current Outpatient Medications   Medication Sig    gabapentin (NEURONTIN) 600 MG tablet Take 600 mg by mouth every evening.    hydrocortisone 2.5 % cream Apply topically 2 (two) times daily.    LO LOESTRIN FE 1 mg-10 mcg (24)/10 mcg (2) Tab Take 1 tablet by mouth once daily.    polyethylene glycol (GLYCOLAX) 17 gram/dose powder Take 17 g by mouth once daily.    triamcinolone acetonide 0.1% (KENALOG) 0.1 % cream Apply to affected areas of body BID prn rash. Do not use on face, underarms, or groin.     No current facility-administered medications for this visit.       SOCIAL HISTORY  Social History     Tobacco Use    Smoking status: Former Smoker     Types: Cigarettes     Start date: 2009     Quit date: 7/3/2016     Years since quittin.4    Smokeless tobacco: Never Used   Substance Use Topics    Alcohol use: Yes     Alcohol/week: 1.0 standard drink     Types: 1 Glasses of wine per week    Drug use: No       Living arrangements - the patient lives with their family.    ROS:    REVIEW OF SYMPTOMS 2021   Do you feel abnormally tired on most days? Yes   Do you feel you generally sleep well? Yes   Do you have difficulty controlling your bladder?  No   Do you have difficulty controlling your bowels?  No   Do you have frequent muscle cramps, tightness or spasms in your limbs?  No   Do you have new visual symptoms?  Yes   Do you have worsening difficulty with your " memory or thinking? No   Do you have worsening symptoms of anxiety or depression?  No   For patients who walk, Do you have more difficulty walking?  No   Have you fallen since your last visit?  No   For patients who use wheelchairs: Do you have any skin wounds or breakdown? No   Do you have difficulty using your hands?  No   Do you have shooting or burning pain? No   Do you have difficulty with sexual function?  Yes   If you are sexually active, are you using birth control? Y/N  N/A Yes   Do you often choke when swallowing liquids or solid food?  Yes   Do you experience worsening symptoms when overheated? No   Do you need any new equipment such as a wheelchair, walker or shower chair? No   Do you receive co-pay financial assistance for your principal MS medicine? No   Would you be interested in participating in an MS research trial in the future? No   For patients on Gilenya, Tecfidera, Aubagio, Rituxan, Ocrevus, Tysabri, Lemtrada or Methotrexate, are you aware that you should NOT receive live virus vaccines?  Yes   Do you feel you have adequate family/friend support?  Yes   Do you have health insurance?   Yes   Are you currently employed? Yes   Do you receive SSDI/SSI?  No   Do you use marijuana or cannabis products? No   Have you been diagnosed with a urinary tract infection since your last visit here? No   Have you been diagnosed with a respiratory tract infection since your last visit here? No   Have you been to the emergency room since your last visit here? No   Have you been hospitalized since your last visit here?  No                Objective:        1. 25 foot timed walk:  Timed 25 Foot Walk: 4/12/2021   Did patient wear an AFO? No   Was assistive device used? No   Time for 25 Foot Walk (seconds) 4.26   Time for 25 Foot Walk (seconds) 4.46       2. 9 Hole Peg Test:  No flowsheet data found.    Neurologic Exam  MENTAL STATUS: grossly intact. Normal language, attention, and memory.   CRANIAL NERVE EXAM:  Extraocular muscles are intact. No facial asymmetry. Shoulder shrug normal b/l There is no dysarthria.   MOTOR EXAM: Normal bulk and tone throughout UE and LE bilaterally. Strength is 5/5 in all groups in the lower extremities and upper extremities.  COORDINATION: No apparent ataxia   GAIT: Narrow based and stable.      Imaging:     No results found for this or any previous visit.    No results found for this or any previous visit.    No results found for this or any previous visit.    Results for orders placed during the hospital encounter of 07/06/21    MRI Brain Demyelinating W W/O Contrast    Impression  Interval development of a subtle subcentimeter new lesion with enhancement left lateral splenium of the corpus callosum concerning for active area of demyelination in light of history    Scattered additional areas of T2 FLAIR signal abnormality throughout the brain parenchyma suggestive for mild degree of underlying prior demyelinating plaque burden.    Clinical correlation and follow-up advised.      Electronically signed by: Andrés Martinez DO  Date:    07/07/2021  Time:    08:30    Results for orders placed during the hospital encounter of 07/06/21    MRI Cervical Spine Demyelinating W W/O Contrast    Impression  No significant change from prior specifically without evidence for new cord signal abnormality or abnormal intrathecal enhancement to suggest cervical demyelination.    Please see above for additional details.      Electronically signed by: Andrés Martinez DO  Date:    07/07/2021  Time:    08:29    Results for orders placed during the hospital encounter of 11/16/20    MRI Thoracic Spine Demyelinating W W/O Contrast    Impression  Brain appears stable from recent examination again demonstrating several scattered T2/FLAIR hyperintense lesions in the supratentorial and infratentorial white matter.  Distribution nonspecific, but remains suspicious for demyelinating disease.  Alternate less common  differential considerations to include Lyme disease, prior vascular insult, or other inflammatory process.  No enhancing foci on postcontrast imaging to specifically indicate active demyelination.  Clinical correlation required.    Spinal cord appears within normal limits.  No focal lesions or enhancement to suggest recent or remote demyelination.    1.7 cm T2 hyperintense nodule in the right lobe of the thyroid gland.  Recommend thyroid ultrasound for further characterization.      Electronically signed by: Ariel Nolan MD  Date:    11/17/2020  Time:    07:44        Labs:     Lab Results   Component Value Date    LIWBXVQX02LG 82 04/05/2021    PATDHALJ36RX 42 11/17/2020    BONKDILQ97PE 23 (L) 12/27/2016     Lab Results   Component Value Date    JCVINDEX 3.79 (H) 07/09/2021    JCVANTIBODY POSITIVE (A) 07/09/2021     Lab Results   Component Value Date    EJ6VYHET 82.0 07/09/2021    ABSOLUTECD3 1782 07/09/2021    GY2WVIOL 27.2 07/09/2021    ABSOLUTECD8 590 07/09/2021    WG1DOMQU 55.4 07/09/2021    ABSOLUTECD4 1205 07/09/2021    LABCD48 2.04 07/09/2021     Lab Results   Component Value Date    WBC 6.12 07/09/2021    RBC 4.21 07/09/2021    HGB 13.3 07/09/2021    HCT 40.8 07/09/2021    MCV 97 07/09/2021    MCH 31.6 (H) 07/09/2021    MCHC 32.6 07/09/2021    RDW 13.2 07/09/2021     07/09/2021    MPV 10.4 07/09/2021    GRAN 3.3 07/09/2021    GRAN 53.8 07/09/2021    LYMPH 2.1 07/09/2021    LYMPH 33.5 07/09/2021    MONO 0.6 07/09/2021    MONO 9.2 07/09/2021    EOS 0.2 07/09/2021    BASO 0.04 07/09/2021    EOSINOPHIL 2.5 07/09/2021    BASOPHIL 0.7 07/09/2021     Sodium   Date Value Ref Range Status   07/09/2021 139 136 - 145 mmol/L Final     Potassium   Date Value Ref Range Status   07/09/2021 3.9 3.5 - 5.1 mmol/L Final     Chloride   Date Value Ref Range Status   07/09/2021 106 95 - 110 mmol/L Final     CO2   Date Value Ref Range Status   07/09/2021 21 (L) 23 - 29 mmol/L Final     Glucose   Date Value Ref Range Status    07/09/2021 74 70 - 110 mg/dL Final     BUN   Date Value Ref Range Status   07/09/2021 11 6 - 20 mg/dL Final     Creatinine   Date Value Ref Range Status   07/09/2021 0.7 0.5 - 1.4 mg/dL Final     Calcium   Date Value Ref Range Status   07/09/2021 9.5 8.7 - 10.5 mg/dL Final     Total Protein   Date Value Ref Range Status   07/09/2021 7.6 6.0 - 8.4 g/dL Final     Albumin   Date Value Ref Range Status   07/09/2021 4.0 3.5 - 5.2 g/dL Final     Total Bilirubin   Date Value Ref Range Status   07/09/2021 1.0 0.1 - 1.0 mg/dL Final     Comment:     For infants and newborns, interpretation of results should be based  on gestational age, weight and in agreement with clinical  observations.    Premature Infant recommended reference ranges:  Up to 24 hours.............<8.0 mg/dL  Up to 48 hours............<12.0 mg/dL  3-5 days..................<15.0 mg/dL  6-29 days.................<15.0 mg/dL       Alkaline Phosphatase   Date Value Ref Range Status   07/09/2021 25 (L) 55 - 135 U/L Final     AST   Date Value Ref Range Status   07/09/2021 31 10 - 40 U/L Final     ALT   Date Value Ref Range Status   07/09/2021 38 10 - 44 U/L Final     Anion Gap   Date Value Ref Range Status   07/09/2021 12 8 - 16 mmol/L Final     eGFR if    Date Value Ref Range Status   07/09/2021 >60.0 >60 mL/min/1.73 m^2 Final     eGFR if non    Date Value Ref Range Status   07/09/2021 >60.0 >60 mL/min/1.73 m^2 Final     Comment:     Calculation used to obtain the estimated glomerular filtration  rate (eGFR) is the CKD-EPI equation.        Lab Results   Component Value Date    HEPBSAG Negative 07/09/2021    HEPBSAB Positive (A) 07/09/2021    HEPBCAB Negative 07/09/2021           MS Impression and Plan:     NEURO MULTIPLE SCLEROSIS IMPRESSION:   MS Status:     Number of relapses in the past year?:  1    Number of relapses in the past year? comment:  No clinical relapse but new lesions on MRI 7/2021    Clinical Progression:   Clinically Stable    MRI Progression:  Worsened (new lesions)    MRI Progression comment:  New enhancing lesion on 7/2021 MRI brain. MRI c-spine stable.  Plan:     DMT:  No change in management    DMT comment:  Continue Ocrevus, only has had one induction dose 9/14/21    Symptom Management:  No change in symptom management     Next Imaging Due: 3/1/2022     Next Labs Due: 2/21/2022     Discussed migraines and work and need to expand her FMLA. Will consult SW to write a letter for her work.  Needs a letter for work:   No more than 40 hours per week   No driving with migraine aura with visual field loss   No driving no more than 2hrs per day total.     F/u in 2-3 months      Our visit today lasted 40 minutes, and 100% of this time was spent face to face with the patient. Over 50% of this visit included discussion of the treatment plan/medication changes/symptom management/exam findings/imaging results/coordination of care. The patient agrees with the plan of care.    Problem List Items Addressed This Visit    None         Neena King MD

## 2021-12-09 NOTE — Clinical Note
Needs a letter for work: No more than 40 hours per week No driving with migraine aura with visual field loss No driving no more than 2hrs per day total.  Will also need FMLA for MS and for migraine

## 2021-12-10 ENCOUNTER — TELEPHONE (OUTPATIENT)
Dept: PSYCHIATRY | Facility: CLINIC | Age: 31
End: 2021-12-10
Payer: COMMERCIAL

## 2021-12-13 ENCOUNTER — PATIENT MESSAGE (OUTPATIENT)
Dept: NEUROLOGY | Facility: CLINIC | Age: 31
End: 2021-12-13
Payer: COMMERCIAL

## 2021-12-21 ENCOUNTER — PATIENT MESSAGE (OUTPATIENT)
Dept: NEUROLOGY | Facility: CLINIC | Age: 31
End: 2021-12-21
Payer: COMMERCIAL

## 2021-12-29 ENCOUNTER — PATIENT MESSAGE (OUTPATIENT)
Dept: NEUROLOGY | Facility: CLINIC | Age: 31
End: 2021-12-29
Payer: COMMERCIAL

## 2021-12-30 ENCOUNTER — PATIENT MESSAGE (OUTPATIENT)
Dept: PSYCHIATRY | Facility: CLINIC | Age: 31
End: 2021-12-30
Payer: COMMERCIAL

## 2021-12-30 NOTE — TELEPHONE ENCOUNTER
Sent completed FMLA to pt via [employer].  Will fax to Pablo once she reviews and responds.

## 2022-01-05 ENCOUNTER — OFFICE VISIT (OUTPATIENT)
Dept: FAMILY MEDICINE | Facility: CLINIC | Age: 32
End: 2022-01-05
Attending: FAMILY MEDICINE
Payer: COMMERCIAL

## 2022-01-05 VITALS
DIASTOLIC BLOOD PRESSURE: 62 MMHG | WEIGHT: 172 LBS | OXYGEN SATURATION: 98 % | HEART RATE: 103 BPM | BODY MASS INDEX: 33.77 KG/M2 | SYSTOLIC BLOOD PRESSURE: 100 MMHG | HEIGHT: 60 IN

## 2022-01-05 DIAGNOSIS — Z00.00 ANNUAL PHYSICAL EXAM: Primary | ICD-10-CM

## 2022-01-05 LAB
BACTERIA #/AREA URNS AUTO: ABNORMAL /HPF
BILIRUB UR QL STRIP: NEGATIVE
CAOX CRY UR QL COMP ASSIST: ABNORMAL
CLARITY UR REFRACT.AUTO: ABNORMAL
COLOR UR AUTO: YELLOW
GLUCOSE UR QL STRIP: NEGATIVE
HGB UR QL STRIP: NEGATIVE
KETONES UR QL STRIP: NEGATIVE
LEUKOCYTE ESTERASE UR QL STRIP: ABNORMAL
MICROSCOPIC COMMENT: ABNORMAL
NITRITE UR QL STRIP: NEGATIVE
PH UR STRIP: 6 [PH] (ref 5–8)
PROT UR QL STRIP: NEGATIVE
RBC #/AREA URNS AUTO: 3 /HPF (ref 0–4)
SP GR UR STRIP: 1.02 (ref 1–1.03)
SQUAMOUS #/AREA URNS AUTO: 5 /HPF
URN SPEC COLLECT METH UR: ABNORMAL
WBC #/AREA URNS AUTO: 5 /HPF (ref 0–5)

## 2022-01-05 PROCEDURE — 99395 PR PREVENTIVE VISIT,EST,18-39: ICD-10-PCS | Mod: S$GLB,,, | Performed by: FAMILY MEDICINE

## 2022-01-05 PROCEDURE — 3074F PR MOST RECENT SYSTOLIC BLOOD PRESSURE < 130 MM HG: ICD-10-PCS | Mod: CPTII,S$GLB,, | Performed by: FAMILY MEDICINE

## 2022-01-05 PROCEDURE — 3008F PR BODY MASS INDEX (BMI) DOCUMENTED: ICD-10-PCS | Mod: CPTII,S$GLB,, | Performed by: FAMILY MEDICINE

## 2022-01-05 PROCEDURE — 3078F PR MOST RECENT DIASTOLIC BLOOD PRESSURE < 80 MM HG: ICD-10-PCS | Mod: CPTII,S$GLB,, | Performed by: FAMILY MEDICINE

## 2022-01-05 PROCEDURE — 99999 PR PBB SHADOW E&M-EST. PATIENT-LVL III: ICD-10-PCS | Mod: PBBFAC,,, | Performed by: FAMILY MEDICINE

## 2022-01-05 PROCEDURE — 99999 PR PBB SHADOW E&M-EST. PATIENT-LVL III: CPT | Mod: PBBFAC,,, | Performed by: FAMILY MEDICINE

## 2022-01-05 PROCEDURE — 3008F BODY MASS INDEX DOCD: CPT | Mod: CPTII,S$GLB,, | Performed by: FAMILY MEDICINE

## 2022-01-05 PROCEDURE — 1160F PR REVIEW ALL MEDS BY PRESCRIBER/CLIN PHARMACIST DOCUMENTED: ICD-10-PCS | Mod: CPTII,S$GLB,, | Performed by: FAMILY MEDICINE

## 2022-01-05 PROCEDURE — 3078F DIAST BP <80 MM HG: CPT | Mod: CPTII,S$GLB,, | Performed by: FAMILY MEDICINE

## 2022-01-05 PROCEDURE — 1160F RVW MEDS BY RX/DR IN RCRD: CPT | Mod: CPTII,S$GLB,, | Performed by: FAMILY MEDICINE

## 2022-01-05 PROCEDURE — 99395 PREV VISIT EST AGE 18-39: CPT | Mod: S$GLB,,, | Performed by: FAMILY MEDICINE

## 2022-01-05 PROCEDURE — 1159F PR MEDICATION LIST DOCUMENTED IN MEDICAL RECORD: ICD-10-PCS | Mod: CPTII,S$GLB,, | Performed by: FAMILY MEDICINE

## 2022-01-05 PROCEDURE — 81001 URINALYSIS AUTO W/SCOPE: CPT | Performed by: FAMILY MEDICINE

## 2022-01-05 PROCEDURE — 3074F SYST BP LT 130 MM HG: CPT | Mod: CPTII,S$GLB,, | Performed by: FAMILY MEDICINE

## 2022-01-05 PROCEDURE — 1159F MED LIST DOCD IN RCRD: CPT | Mod: CPTII,S$GLB,, | Performed by: FAMILY MEDICINE

## 2022-01-10 ENCOUNTER — LAB VISIT (OUTPATIENT)
Dept: LAB | Facility: HOSPITAL | Age: 32
End: 2022-01-10
Attending: FAMILY MEDICINE
Payer: COMMERCIAL

## 2022-01-10 DIAGNOSIS — Z00.00 ANNUAL PHYSICAL EXAM: ICD-10-CM

## 2022-01-10 LAB
ALBUMIN SERPL BCP-MCNC: 3.7 G/DL (ref 3.5–5.2)
ALP SERPL-CCNC: 28 U/L (ref 55–135)
ALT SERPL W/O P-5'-P-CCNC: 18 U/L (ref 10–44)
ANION GAP SERPL CALC-SCNC: 10 MMOL/L (ref 8–16)
AST SERPL-CCNC: 20 U/L (ref 10–40)
BASOPHILS # BLD AUTO: 0.05 K/UL (ref 0–0.2)
BASOPHILS NFR BLD: 0.9 % (ref 0–1.9)
BILIRUB SERPL-MCNC: 0.5 MG/DL (ref 0.1–1)
BUN SERPL-MCNC: 11 MG/DL (ref 6–20)
CALCIUM SERPL-MCNC: 9.5 MG/DL (ref 8.7–10.5)
CHLORIDE SERPL-SCNC: 104 MMOL/L (ref 95–110)
CHOLEST SERPL-MCNC: 218 MG/DL (ref 120–199)
CHOLEST/HDLC SERPL: 5.1 {RATIO} (ref 2–5)
CO2 SERPL-SCNC: 23 MMOL/L (ref 23–29)
CREAT SERPL-MCNC: 0.7 MG/DL (ref 0.5–1.4)
DIFFERENTIAL METHOD: ABNORMAL
EOSINOPHIL # BLD AUTO: 0.2 K/UL (ref 0–0.5)
EOSINOPHIL NFR BLD: 2.6 % (ref 0–8)
ERYTHROCYTE [DISTWIDTH] IN BLOOD BY AUTOMATED COUNT: 12.8 % (ref 11.5–14.5)
EST. GFR  (AFRICAN AMERICAN): >60 ML/MIN/1.73 M^2
EST. GFR  (NON AFRICAN AMERICAN): >60 ML/MIN/1.73 M^2
GLUCOSE SERPL-MCNC: 84 MG/DL (ref 70–110)
HCT VFR BLD AUTO: 41.5 % (ref 37–48.5)
HDLC SERPL-MCNC: 43 MG/DL (ref 40–75)
HDLC SERPL: 19.7 % (ref 20–50)
HGB BLD-MCNC: 13.4 G/DL (ref 12–16)
IMM GRANULOCYTES # BLD AUTO: 0.01 K/UL (ref 0–0.04)
IMM GRANULOCYTES NFR BLD AUTO: 0.2 % (ref 0–0.5)
LDLC SERPL CALC-MCNC: 156.4 MG/DL (ref 63–159)
LYMPHOCYTES # BLD AUTO: 2.1 K/UL (ref 1–4.8)
LYMPHOCYTES NFR BLD: 36.9 % (ref 18–48)
MCH RBC QN AUTO: 31.3 PG (ref 27–31)
MCHC RBC AUTO-ENTMCNC: 32.3 G/DL (ref 32–36)
MCV RBC AUTO: 97 FL (ref 82–98)
MONOCYTES # BLD AUTO: 0.4 K/UL (ref 0.3–1)
MONOCYTES NFR BLD: 7.6 % (ref 4–15)
NEUTROPHILS # BLD AUTO: 3 K/UL (ref 1.8–7.7)
NEUTROPHILS NFR BLD: 51.8 % (ref 38–73)
NONHDLC SERPL-MCNC: 175 MG/DL
NRBC BLD-RTO: 0 /100 WBC
PLATELET # BLD AUTO: 409 K/UL (ref 150–450)
PMV BLD AUTO: 10 FL (ref 9.2–12.9)
POTASSIUM SERPL-SCNC: 3.9 MMOL/L (ref 3.5–5.1)
PROT SERPL-MCNC: 7.7 G/DL (ref 6–8.4)
RBC # BLD AUTO: 4.28 M/UL (ref 4–5.4)
SODIUM SERPL-SCNC: 137 MMOL/L (ref 136–145)
TRIGL SERPL-MCNC: 93 MG/DL (ref 30–150)
TSH SERPL DL<=0.005 MIU/L-ACNC: 0.86 UIU/ML (ref 0.4–4)
WBC # BLD AUTO: 5.78 K/UL (ref 3.9–12.7)

## 2022-01-10 PROCEDURE — 85025 COMPLETE CBC W/AUTO DIFF WBC: CPT | Performed by: FAMILY MEDICINE

## 2022-01-10 PROCEDURE — 82652 VIT D 1 25-DIHYDROXY: CPT | Performed by: FAMILY MEDICINE

## 2022-01-10 PROCEDURE — 84443 ASSAY THYROID STIM HORMONE: CPT | Performed by: FAMILY MEDICINE

## 2022-01-10 PROCEDURE — 80061 LIPID PANEL: CPT | Performed by: FAMILY MEDICINE

## 2022-01-10 PROCEDURE — 36415 COLL VENOUS BLD VENIPUNCTURE: CPT | Mod: PO | Performed by: FAMILY MEDICINE

## 2022-01-10 PROCEDURE — 80053 COMPREHEN METABOLIC PANEL: CPT | Performed by: FAMILY MEDICINE

## 2022-01-11 LAB — 1,25(OH)2D3 SERPL-MCNC: 70 PG/ML (ref 20–79)

## 2022-01-13 ENCOUNTER — PATIENT MESSAGE (OUTPATIENT)
Dept: NEUROLOGY | Facility: CLINIC | Age: 32
End: 2022-01-13
Payer: COMMERCIAL

## 2022-01-13 NOTE — LETTER
[facility] [address]  [address]  [facility] [address]  Phone: [phone]     2022      RE: Jessica Garcia (: 1990)      Dear Employer:    Ms. Garcia may resume her regular work duties with no time restriction on her driving, effective Tuesday, 2022.  Of course, she should not drive if she experiences migraine aura with visual disturbances, but I am no longer restricting her to 2 hours of driving per day.  I can be reached at the above phone number with any questions or concerns    Sincerely,    Neena King MD        SS:gavin

## 2022-01-15 NOTE — TELEPHONE ENCOUNTER
Sent pt updated work letter lifting her 2 hour/day driving restriction, per approval of MORRO King MD.

## 2022-01-29 DIAGNOSIS — D84.9 IMMUNOSUPPRESSED STATUS: ICD-10-CM

## 2022-01-31 ENCOUNTER — PATIENT MESSAGE (OUTPATIENT)
Dept: FAMILY MEDICINE | Facility: CLINIC | Age: 32
End: 2022-01-31
Payer: COMMERCIAL

## 2022-01-31 NOTE — PROGRESS NOTES
Subjective:       Patient ID: Jessica Garcia is a 31 y.o. female.    Chief Complaint: Establish Care    HPI   Pt with h/o MS followed in neurology no acute complaints is here for annual exam pt is well no sob/cp no change in bowel habits no brbpr  Pt denies n/v/f/c/d/c no cough chest congestion no sore throat  Pt denies dysuria hematuria no abnl vaginal bleeding  Review of Systems   Constitutional: Negative for activity change, chills, diaphoresis, fatigue and fever.   HENT: Negative for congestion, ear discharge, ear pain, hearing loss, postnasal drip, rhinorrhea, sinus pressure, sneezing, sore throat, trouble swallowing and voice change.    Eyes: Negative for photophobia, discharge, redness, itching and visual disturbance.   Respiratory: Negative for cough, chest tightness, shortness of breath and wheezing.    Cardiovascular: Negative for chest pain, palpitations and leg swelling.   Gastrointestinal: Negative for abdominal pain, anal bleeding, blood in stool, constipation, diarrhea, nausea, rectal pain and vomiting.   Genitourinary: Negative for dyspareunia, dysuria, flank pain, frequency, hematuria, menstrual problem, pelvic pain, urgency, vaginal bleeding and vaginal discharge.   Musculoskeletal: Negative for arthralgias, back pain, joint swelling and neck pain.   Skin: Negative for color change and rash.   Neurological: Negative for dizziness, speech difficulty, weakness, light-headedness, numbness and headaches.   Hematological: Does not bruise/bleed easily.   Psychiatric/Behavioral: Negative for agitation, confusion, decreased concentration, sleep disturbance and suicidal ideas. The patient is not nervous/anxious.        Objective:     /62   Pulse 103   Ht 5' (1.524 m)   Wt 78 kg (172 lb)   SpO2 98%   BMI 33.59 kg/m²     Physical Exam  Constitutional:       Appearance: Normal appearance. She is well-developed and well-nourished. She is not ill-appearing.   HENT:      Head: Normocephalic and  atraumatic.      Right Ear: Tympanic membrane and external ear normal.      Left Ear: Tympanic membrane and external ear normal.      Nose: Nose normal.      Mouth/Throat:      Mouth: Oropharynx is clear and moist.   Eyes:      General:         Right eye: No discharge.         Left eye: No discharge.      Extraocular Movements: EOM normal.      Conjunctiva/sclera: Conjunctivae normal.      Pupils: Pupils are equal, round, and reactive to light.   Neck:      Thyroid: No thyromegaly.   Cardiovascular:      Rate and Rhythm: Normal rate and regular rhythm.      Pulses: Intact distal pulses.      Heart sounds: Normal heart sounds. No murmur heard.  No friction rub. No gallop.    Pulmonary:      Effort: Pulmonary effort is normal.      Breath sounds: Normal breath sounds. No wheezing or rales.   Abdominal:      General: Bowel sounds are normal. There is no distension.      Palpations: Abdomen is soft.      Tenderness: There is no abdominal tenderness. There is no guarding or rebound.   Genitourinary:     Vagina: Normal.      Uterus: Normal.       Comments: declined  Musculoskeletal:         General: No tenderness or edema. Normal range of motion.      Cervical back: Normal range of motion and neck supple.   Lymphadenopathy:      Cervical: No cervical adenopathy.   Skin:     General: Skin is warm and dry.      Findings: No erythema or rash.   Neurological:      General: No focal deficit present.      Mental Status: She is alert and oriented to person, place, and time.      Cranial Nerves: No cranial nerve deficit.      Motor: No abnormal muscle tone.      Coordination: Coordination normal.   Psychiatric:         Mood and Affect: Mood and affect normal.         Behavior: Behavior normal.         Thought Content: Thought content normal.         Judgment: Judgment normal.         Assessment:       1. Annual physical exam        Plan:     orders cmp cbc lipid tsh urine  Cont meds  Low fat diet  Graded exercise  rtc  "annually    Health maintenance  Lipid ordered  Flu in fall  Tetanus q 10 years  covid discussed  Pap with gyn         "This note will not be shared with the patient."     "

## 2022-02-01 ENCOUNTER — PATIENT MESSAGE (OUTPATIENT)
Dept: NEUROLOGY | Facility: CLINIC | Age: 32
End: 2022-02-01
Payer: COMMERCIAL

## 2022-02-04 DIAGNOSIS — D84.9 IMMUNOSUPPRESSED STATUS: ICD-10-CM

## 2022-02-05 ENCOUNTER — HOSPITAL ENCOUNTER (OUTPATIENT)
Dept: RADIOLOGY | Facility: HOSPITAL | Age: 32
Discharge: HOME | End: 2022-02-05
Attending: PSYCHIATRY & NEUROLOGY
Payer: COMMERCIAL

## 2022-02-05 DIAGNOSIS — G35 MULTIPLE SCLEROSIS: ICD-10-CM

## 2022-02-05 PROCEDURE — 70553 MRI BRAIN DEMYELINATING W/ WO CONTRAST: ICD-10-PCS | Mod: 26,,, | Performed by: RADIOLOGY

## 2022-02-05 PROCEDURE — 70553 MRI BRAIN STEM W/O & W/DYE: CPT | Mod: 26,,, | Performed by: RADIOLOGY

## 2022-02-05 PROCEDURE — 72156 MRI NECK SPINE W/O & W/DYE: CPT | Mod: TC

## 2022-02-05 PROCEDURE — 72156 MRI NECK SPINE W/O & W/DYE: CPT | Mod: 26,,, | Performed by: RADIOLOGY

## 2022-02-05 PROCEDURE — 25500020 PHARM REV CODE 255: Performed by: PSYCHIATRY & NEUROLOGY

## 2022-02-05 PROCEDURE — A9585 GADOBUTROL INJECTION: HCPCS | Performed by: PSYCHIATRY & NEUROLOGY

## 2022-02-05 PROCEDURE — 72156 MRI CERVICAL SPINE DEMYELINATING W W/O CONTRAST: ICD-10-PCS | Mod: 26,,, | Performed by: RADIOLOGY

## 2022-02-05 PROCEDURE — 70553 MRI BRAIN STEM W/O & W/DYE: CPT | Mod: TC

## 2022-02-05 RX ORDER — GADOBUTROL 604.72 MG/ML
8 INJECTION INTRAVENOUS
Status: COMPLETED | OUTPATIENT
Start: 2022-02-05 | End: 2022-02-05

## 2022-02-05 RX ADMIN — GADOBUTROL 8 ML: 604.72 INJECTION INTRAVENOUS at 12:02

## 2022-02-06 DIAGNOSIS — D84.9 IMMUNOSUPPRESSED STATUS: ICD-10-CM

## 2022-02-15 ENCOUNTER — PATIENT MESSAGE (OUTPATIENT)
Dept: NEUROLOGY | Facility: CLINIC | Age: 32
End: 2022-02-15
Payer: COMMERCIAL

## 2022-02-15 ENCOUNTER — TELEPHONE (OUTPATIENT)
Dept: NEUROLOGY | Facility: CLINIC | Age: 32
End: 2022-02-15
Payer: COMMERCIAL

## 2022-02-17 ENCOUNTER — LAB VISIT (OUTPATIENT)
Dept: LAB | Facility: HOSPITAL | Age: 32
End: 2022-02-17
Attending: FAMILY MEDICINE
Payer: COMMERCIAL

## 2022-02-17 DIAGNOSIS — G35 MULTIPLE SCLEROSIS: ICD-10-CM

## 2022-02-17 DIAGNOSIS — E55.9 VITAMIN D DEFICIENCY: ICD-10-CM

## 2022-02-17 LAB
25(OH)D3+25(OH)D2 SERPL-MCNC: 71 NG/ML (ref 30–96)
ALBUMIN SERPL BCP-MCNC: 3.5 G/DL (ref 3.5–5.2)
ALP SERPL-CCNC: 26 U/L (ref 55–135)
ALT SERPL W/O P-5'-P-CCNC: 17 U/L (ref 10–44)
ANION GAP SERPL CALC-SCNC: 6 MMOL/L (ref 8–16)
AST SERPL-CCNC: 20 U/L (ref 10–40)
BASOPHILS # BLD AUTO: 0.05 K/UL (ref 0–0.2)
BASOPHILS NFR BLD: 1 % (ref 0–1.9)
BILIRUB SERPL-MCNC: 0.5 MG/DL (ref 0.1–1)
BUN SERPL-MCNC: 11 MG/DL (ref 6–20)
CALCIUM SERPL-MCNC: 9.2 MG/DL (ref 8.7–10.5)
CHLORIDE SERPL-SCNC: 107 MMOL/L (ref 95–110)
CO2 SERPL-SCNC: 26 MMOL/L (ref 23–29)
CREAT SERPL-MCNC: 0.7 MG/DL (ref 0.5–1.4)
DIFFERENTIAL METHOD: ABNORMAL
EOSINOPHIL # BLD AUTO: 0.1 K/UL (ref 0–0.5)
EOSINOPHIL NFR BLD: 2.2 % (ref 0–8)
ERYTHROCYTE [DISTWIDTH] IN BLOOD BY AUTOMATED COUNT: 12.7 % (ref 11.5–14.5)
EST. GFR  (AFRICAN AMERICAN): >60 ML/MIN/1.73 M^2
EST. GFR  (NON AFRICAN AMERICAN): >60 ML/MIN/1.73 M^2
GLUCOSE SERPL-MCNC: 90 MG/DL (ref 70–110)
HCT VFR BLD AUTO: 41.1 % (ref 37–48.5)
HGB BLD-MCNC: 13.3 G/DL (ref 12–16)
IMM GRANULOCYTES # BLD AUTO: 0.01 K/UL (ref 0–0.04)
IMM GRANULOCYTES NFR BLD AUTO: 0.2 % (ref 0–0.5)
LYMPHOCYTES # BLD AUTO: 2.2 K/UL (ref 1–4.8)
LYMPHOCYTES NFR BLD: 43.9 % (ref 18–48)
MCH RBC QN AUTO: 31.1 PG (ref 27–31)
MCHC RBC AUTO-ENTMCNC: 32.4 G/DL (ref 32–36)
MCV RBC AUTO: 96 FL (ref 82–98)
MONOCYTES # BLD AUTO: 0.4 K/UL (ref 0.3–1)
MONOCYTES NFR BLD: 8.5 % (ref 4–15)
NEUTROPHILS # BLD AUTO: 2.2 K/UL (ref 1.8–7.7)
NEUTROPHILS NFR BLD: 44.2 % (ref 38–73)
NRBC BLD-RTO: 0 /100 WBC
PLATELET # BLD AUTO: 408 K/UL (ref 150–450)
PMV BLD AUTO: 10.1 FL (ref 9.2–12.9)
POTASSIUM SERPL-SCNC: 4.3 MMOL/L (ref 3.5–5.1)
PROT SERPL-MCNC: 7.4 G/DL (ref 6–8.4)
RBC # BLD AUTO: 4.27 M/UL (ref 4–5.4)
SODIUM SERPL-SCNC: 139 MMOL/L (ref 136–145)
WBC # BLD AUTO: 4.97 K/UL (ref 3.9–12.7)

## 2022-02-17 PROCEDURE — 86706 HEP B SURFACE ANTIBODY: CPT | Performed by: PSYCHIATRY & NEUROLOGY

## 2022-02-17 PROCEDURE — 86704 HEP B CORE ANTIBODY TOTAL: CPT | Performed by: PSYCHIATRY & NEUROLOGY

## 2022-02-17 PROCEDURE — 85025 COMPLETE CBC W/AUTO DIFF WBC: CPT | Performed by: PSYCHIATRY & NEUROLOGY

## 2022-02-17 PROCEDURE — 87340 HEPATITIS B SURFACE AG IA: CPT | Performed by: PSYCHIATRY & NEUROLOGY

## 2022-02-17 PROCEDURE — 82306 VITAMIN D 25 HYDROXY: CPT | Performed by: PSYCHIATRY & NEUROLOGY

## 2022-02-17 PROCEDURE — 80053 COMPREHEN METABOLIC PANEL: CPT | Performed by: PSYCHIATRY & NEUROLOGY

## 2022-02-17 PROCEDURE — 36415 COLL VENOUS BLD VENIPUNCTURE: CPT | Mod: PN | Performed by: PSYCHIATRY & NEUROLOGY

## 2022-02-20 ENCOUNTER — TELEPHONE (OUTPATIENT)
Dept: NEUROLOGY | Facility: CLINIC | Age: 32
End: 2022-02-20
Payer: COMMERCIAL

## 2022-02-21 LAB
HBV CORE AB SERPL QL IA: NEGATIVE
HBV SURFACE AG SERPL QL IA: NEGATIVE

## 2022-02-22 NOTE — TELEPHONE ENCOUNTER
Ocrevus safety labs reviewed and unremarkable; however the hepatitis b surface antibody level has not resulted.

## 2022-02-23 ENCOUNTER — PATIENT OUTREACH (OUTPATIENT)
Dept: ADMINISTRATIVE | Facility: OTHER | Age: 32
End: 2022-02-23
Payer: COMMERCIAL

## 2022-02-23 LAB — HBV SURFACE AB SER-ACNC: POSITIVE M[IU]/ML

## 2022-02-23 NOTE — PROGRESS NOTES
Health Maintenance Due   Topic Date Due    Pneumococcal Vaccines (Age 0-64) (1 of 2 - PPSV23) Never done    TETANUS VACCINE  06/02/2016    Influenza Vaccine (1) 09/01/2021    COVID-19 Vaccine (4 - Booster for Moderna series) 03/22/2022     Updates were requested from care everywhere.  Chart was reviewed for overdue Proactive [facility] Encounters (JACKIE) topics (CRS, Breast Cancer Screening, Eye exam)  Health Maintenance has been updated.  LINKS immunization registry triggered.  Immunizations were reconciled.

## 2022-02-28 ENCOUNTER — PATIENT MESSAGE (OUTPATIENT)
Dept: PSYCHIATRY | Facility: CLINIC | Age: 32
End: 2022-02-28
Payer: COMMERCIAL

## 2022-03-16 ENCOUNTER — OFFICE VISIT (OUTPATIENT)
Dept: NEUROLOGY | Facility: CLINIC | Age: 32
End: 2022-03-16
Payer: COMMERCIAL

## 2022-03-16 DIAGNOSIS — R68.82 LOW LIBIDO: ICD-10-CM

## 2022-03-16 DIAGNOSIS — Z51.81 ENCOUNTER FOR MONITORING IMMUNOMODULATING THERAPY: ICD-10-CM

## 2022-03-16 DIAGNOSIS — G35 MULTIPLE SCLEROSIS, RELAPSING-REMITTING: Primary | ICD-10-CM

## 2022-03-16 DIAGNOSIS — G43.709 CHRONIC MIGRAINE WITHOUT AURA WITHOUT STATUS MIGRAINOSUS, NOT INTRACTABLE: ICD-10-CM

## 2022-03-16 DIAGNOSIS — Z79.899 ENCOUNTER FOR MONITORING IMMUNOMODULATING THERAPY: ICD-10-CM

## 2022-03-16 PROCEDURE — 99215 PR OFFICE/OUTPT VISIT, EST, LEVL V, 40-54 MIN: ICD-10-PCS | Mod: 95,,, | Performed by: PSYCHIATRY & NEUROLOGY

## 2022-03-16 PROCEDURE — 99215 OFFICE O/P EST HI 40 MIN: CPT | Mod: 95,,, | Performed by: PSYCHIATRY & NEUROLOGY

## 2022-03-16 NOTE — PROGRESS NOTES
The patient location is: [place]  The chief complaint leading to consultation is: MS f/u    Visit type: audiovisual    Face to Face time with patient: 40  50 minutes of total time spent on the encounter, which includes face to face time and non-face to face time preparing to see the patient (eg, review of tests), Obtaining and/or reviewing separately obtained history, Documenting clinical information in the electronic or other health record, Independently interpreting results (not separately reported) and communicating results to the patient/family/caregiver, or Care coordination (not separately reported).         Each patient to whom he or she provides medical services by telemedicine is:  (1) informed of the relationship between the physician and patient and the respective role of any other health care provider with respect to management of the patient; and (2) notified that he or she may decline to receive medical services by telemedicine and may withdraw from such care at any time.    Notes:     Subjective:          Patient ID: Jessica Garcia is a 31 y.o. female who presents today for a routine clinic visit for MS.      MS HPI:  · DMT: ocrelizumab  · Side effects from DMT? Some insomnia, didn't go to sleep until 3am  · Taking vitamin D3 as recommended? Yes  · No new s/s of relapse or worsening.  · Symptoms have been decreasing. Migraine aura has been decreasing every month, only about 4 since last visit. No migraines since Jan. With the aura, she doesn't consistently get the headache with it.  · No other signs of MS pseudorelapse symptoms since December 2021 around [place].  · Learning to exercise more self care and wellness.  · Currently with low libido. Wondering if MS could be the problem.      Medications:  Current Outpatient Medications on File Prior to Visit   Medication Sig Dispense Refill Last Dose    gabapentin (NEURONTIN) 600 MG tablet Take 600 mg by mouth every evening.       hydrocortisone 2.5 %  cream Apply topically 2 (two) times daily. 28 g 1     LO LOESTRIN FE 1 mg-10 mcg (24)/10 mcg (2) Tab Take 1 tablet by mouth once daily. 84 tablet 4     polyethylene glycol (GLYCOLAX) 17 gram/dose powder Take 17 g by mouth once daily. 235 g 0     triamcinolone acetonide 0.1% (KENALOG) 0.1 % cream Apply to affected areas of body BID prn rash. Do not use on face, underarms, or groin. 80 g 3          SOCIAL HISTORY  Social History     Tobacco Use    Smoking status: Never Smoker    Smokeless tobacco: Never Used   Substance Use Topics    Alcohol use: Yes     Alcohol/week: 1.0 standard drink     Types: 1 Glasses of wine per week    Drug use: No     Living arrangements - the patient lives with her spouse.      ROS:    REVIEW OF SYMPTOMS 12/9/2021   Do you feel abnormally tired on most days? Yes   Do you feel you generally sleep well? Yes   Do you have difficulty controlling your bladder?  No   Do you have difficulty controlling your bowels?  No   Do you have frequent muscle cramps, tightness or spasms in your limbs?  No   Do you have new visual symptoms?  Yes   Do you have worsening difficulty with your memory or thinking? No   Do you have worsening symptoms of anxiety or depression?  No   For patients who walk, Do you have more difficulty walking?  No   Have you fallen since your last visit?  No   For patients who use wheelchairs: Do you have any skin wounds or breakdown? No   Do you have difficulty using your hands?  No   Do you have shooting or burning pain? No   Do you have difficulty with sexual function?  Yes   If you are sexually active, are you using birth control? Y/N  N/A Yes   Do you often choke when swallowing liquids or solid food?  Yes   Do you experience worsening symptoms when overheated? No   Do you need any new equipment such as a wheelchair, walker or shower chair? No   Do you receive co-pay financial assistance for your principal MS medicine? No   Would you be interested in participating in an MS  research trial in the future? No   For patients on Gilenya, Tecfidera, Aubagio, Rituxan, Ocrevus, Tysabri, Lemtrada or Methotrexate, are you aware that you should NOT receive live virus vaccines?  Yes   Do you feel you have adequate family/friend support?  Yes   Do you have health insurance?   Yes   Are you currently employed? Yes   Do you receive SSDI/SSI?  No   Do you use marijuana or cannabis products? No   Have you been diagnosed with a urinary tract infection since your last visit here? No   Have you been diagnosed with a respiratory tract infection since your last visit here? No   Have you been to the emergency room since your last visit here? No   Have you been hospitalized since your last visit here?  No                Objective:        1. 25 foot timed walk:  Timed 25 Foot Walk: 4/12/2021   Did patient wear an AFO? No   Was assistive device used? No   Time for 25 Foot Walk (seconds) 4.26   Time for 25 Foot Walk (seconds) 4.46       2. 9 Hole Peg Test:  No flowsheet data found.    Neurologic Exam  MENTAL STATUS: grossly intact. Normal language, attention, and memory.   CRANIAL NERVE EXAM: Extraocular muscles are intact. No facial asymmetry. Shoulder shrug normal b/l There is no dysarthria.   MOTOR EXAM: Normal bulk and tone throughout UE and LE bilaterally. Strength is 5/5 in all groups in the lower extremities and upper extremities.  COORDINATION: No apparent ataxia   GAIT: Narrow based and stable.      Imaging:     MRI Brain and C-spine w/wo contrast 2/5/22: no new or enhancing lesions        MRI Brain Demyelinating W W/O Contrast    Impression  Interval development of a subtle subcentimeter new lesion with enhancement left lateral splenium of the corpus callosum concerning for active area of demyelination in light of history    Scattered additional areas of T2 FLAIR signal abnormality throughout the brain parenchyma suggestive for mild degree of underlying prior demyelinating plaque burden.    Clinical  correlation and follow-up advised.      Electronically signed by: Andrés Martinez DO  Date:    07/07/2021  Time:    08:30    Results for orders placed during the hospital encounter of 07/06/21    MRI Cervical Spine Demyelinating W W/O Contrast    Impression  No significant change from prior specifically without evidence for new cord signal abnormality or abnormal intrathecal enhancement to suggest cervical demyelination.    Please see above for additional details.      Electronically signed by: Andrés Martinez DO  Date:    07/07/2021  Time:    08:29    Results for orders placed during the hospital encounter of 11/16/20    MRI Thoracic Spine Demyelinating W W/O Contrast    Impression  Brain appears stable from recent examination again demonstrating several scattered T2/FLAIR hyperintense lesions in the supratentorial and infratentorial white matter.  Distribution nonspecific, but remains suspicious for demyelinating disease.  Alternate less common differential considerations to include Lyme disease, prior vascular insult, or other inflammatory process.  No enhancing foci on postcontrast imaging to specifically indicate active demyelination.  Clinical correlation required.    Spinal cord appears within normal limits.  No focal lesions or enhancement to suggest recent or remote demyelination.    1.7 cm T2 hyperintense nodule in the right lobe of the thyroid gland.  Recommend thyroid ultrasound for further characterization.      Electronically signed by: Ariel Nolan MD  Date:    11/17/2020  Time:    07:44        Labs:     Lab Results   Component Value Date    CBRULGDF10NC 71 02/17/2022    VQMPPEBA19GY 82 04/05/2021    FDUZSHEF14DM 42 11/17/2020     Lab Results   Component Value Date    JCVINDEX 3.79 (H) 07/09/2021    JCVANTIBODY POSITIVE (A) 07/09/2021     Lab Results   Component Value Date    NU2PDRUB 82.0 07/09/2021    ABSOLUTECD3 1782 07/09/2021    QW1JICSY 27.2 07/09/2021    ABSOLUTECD8 590 07/09/2021    FY0PVALB  55.4 07/09/2021    ABSOLUTECD4 1205 07/09/2021    LABCD48 2.04 07/09/2021     Lab Results   Component Value Date    WBC 4.97 02/17/2022    RBC 4.27 02/17/2022    HGB 13.3 02/17/2022    HCT 41.1 02/17/2022    MCV 96 02/17/2022    MCH 31.1 (H) 02/17/2022    MCHC 32.4 02/17/2022    RDW 12.7 02/17/2022     02/17/2022    MPV 10.1 02/17/2022    GRAN 2.2 02/17/2022    GRAN 44.2 02/17/2022    LYMPH 2.2 02/17/2022    LYMPH 43.9 02/17/2022    MONO 0.4 02/17/2022    MONO 8.5 02/17/2022    EOS 0.1 02/17/2022    BASO 0.05 02/17/2022    EOSINOPHIL 2.2 02/17/2022    BASOPHIL 1.0 02/17/2022     Sodium   Date Value Ref Range Status   02/17/2022 139 136 - 145 mmol/L Final     Potassium   Date Value Ref Range Status   02/17/2022 4.3 3.5 - 5.1 mmol/L Final     Chloride   Date Value Ref Range Status   02/17/2022 107 95 - 110 mmol/L Final     CO2   Date Value Ref Range Status   02/17/2022 26 23 - 29 mmol/L Final     Glucose   Date Value Ref Range Status   02/17/2022 90 70 - 110 mg/dL Final     BUN   Date Value Ref Range Status   02/17/2022 11 6 - 20 mg/dL Final     Creatinine   Date Value Ref Range Status   02/17/2022 0.7 0.5 - 1.4 mg/dL Final     Calcium   Date Value Ref Range Status   02/17/2022 9.2 8.7 - 10.5 mg/dL Final     Total Protein   Date Value Ref Range Status   02/17/2022 7.4 6.0 - 8.4 g/dL Final     Albumin   Date Value Ref Range Status   02/17/2022 3.5 3.5 - 5.2 g/dL Final     Total Bilirubin   Date Value Ref Range Status   02/17/2022 0.5 0.1 - 1.0 mg/dL Final     Comment:     For infants and newborns, interpretation of results should be based  on gestational age, weight and in agreement with clinical  observations.    Premature Infant recommended reference ranges:  Up to 24 hours.............<8.0 mg/dL  Up to 48 hours............<12.0 mg/dL  3-5 days..................<15.0 mg/dL  6-29 days.................<15.0 mg/dL       Alkaline Phosphatase   Date Value Ref Range Status   02/17/2022 26 (L) 55 - 135 U/L Final      AST   Date Value Ref Range Status   02/17/2022 20 10 - 40 U/L Final     ALT   Date Value Ref Range Status   02/17/2022 17 10 - 44 U/L Final     Anion Gap   Date Value Ref Range Status   02/17/2022 6 (L) 8 - 16 mmol/L Final     eGFR if    Date Value Ref Range Status   02/17/2022 >60.0 >60 mL/min/1.73 m^2 Final     eGFR if non    Date Value Ref Range Status   02/17/2022 >60.0 >60 mL/min/1.73 m^2 Final     Comment:     Calculation used to obtain the estimated glomerular filtration  rate (eGFR) is the CKD-EPI equation.        Lab Results   Component Value Date    HEPBSAG Negative 02/17/2022    HEPBSAB Positive 02/17/2022    HEPBCAB Negative 02/17/2022           MS Impression and Plan:     NEURO MULTIPLE SCLEROSIS IMPRESSION:   MS Status:     Number of relapses in the past year?:  1    Number of relapses in the past year? comment:  No clinical relapse but new lesions on MRI 7/2021    Clinical Progression:  Clinically Stable    MRI Progression:  Improved    MRI Progression comment:  New enhancing lesion on 7/2021 MRI brain. 2/5/22 MRI brain and c-spine without new/enhancing lesions  Plan:     DMT:  No change in management    DMT comment:  Continue Ocrevus, infuses Mar/Sep    Symptom Management:  No change in symptom management     Next Imaging Due: 3/1/2022     Next Labs Due: 2/21/2022     Chronic migraine with aura, not intractable: significantly improved since last visit. She's gone back to work without issue.   Low libido: MS counseling given. Possibly due to MS vs in setting of depression/anxiety as it relates to relatively new diagnosis of MS. If she is still having issues with intimacy at next visit, will consider referral to Dr. Figueredo. Recommended that she also follow with PCP to see if hormonal imbalance is contributing as well.      F/u with Leigh Cline in 3 months.        Problem List Items Addressed This Visit    None         Neena King MD

## 2022-03-29 ENCOUNTER — PATIENT MESSAGE (OUTPATIENT)
Dept: NEUROLOGY | Facility: CLINIC | Age: 32
End: 2022-03-29
Payer: COMMERCIAL

## 2022-04-26 ENCOUNTER — TELEPHONE (OUTPATIENT)
Dept: NEUROLOGY | Facility: CLINIC | Age: 32
End: 2022-04-26
Payer: COMMERCIAL

## 2022-05-16 ENCOUNTER — PATIENT MESSAGE (OUTPATIENT)
Dept: OBSTETRICS AND GYNECOLOGY | Facility: CLINIC | Age: 32
End: 2022-05-16
Payer: COMMERCIAL

## 2022-05-18 ENCOUNTER — PATIENT MESSAGE (OUTPATIENT)
Dept: NEUROLOGY | Facility: CLINIC | Age: 32
End: 2022-05-18
Payer: COMMERCIAL

## 2022-05-18 NOTE — LETTER
This communication is flagged as high priority.     [facility] [address]  [address]  [facility] [address]  Phone: [phone]         2022      RE: Jessica Garcia (: 1990)      Dear Employer:    Ms. Garcia is under my care for treatment of a medical condition that qualifies her for intermittent FMLA.  She is experiencing a flare up of her symptoms, so I am excusing her from work 22 - 22.  She may return to her normal duties on Monday, 22.  I can be reached at the above phone number with any questions or concerns.    Sincerely,    Neena King MD        SS:gavin

## 2022-05-26 ENCOUNTER — OFFICE VISIT (OUTPATIENT)
Dept: OBSTETRICS AND GYNECOLOGY | Facility: CLINIC | Age: 32
End: 2022-05-26
Payer: COMMERCIAL

## 2022-05-26 VITALS
SYSTOLIC BLOOD PRESSURE: 118 MMHG | WEIGHT: 176.13 LBS | DIASTOLIC BLOOD PRESSURE: 76 MMHG | BODY MASS INDEX: 29.34 KG/M2 | HEIGHT: 65 IN

## 2022-05-26 DIAGNOSIS — R10.2 PELVIC PAIN IN FEMALE: Primary | ICD-10-CM

## 2022-05-26 DIAGNOSIS — N94.2 VAGINISMUS: ICD-10-CM

## 2022-05-26 LAB
B-HCG UR QL: NEGATIVE
BILIRUB SERPL-MCNC: NORMAL MG/DL
BLOOD URINE, POC: NORMAL
CLARITY, POC UA: CLEAR
COLOR, POC UA: YELLOW
CTP QC/QA: YES
GLUCOSE UR QL STRIP: NORMAL
KETONES UR QL STRIP: NORMAL
LEUKOCYTE ESTERASE URINE, POC: NORMAL
NITRITE, POC UA: NORMAL
PH, POC UA: 5
PROTEIN, POC: NORMAL
SPECIFIC GRAVITY, POC UA: 1.02
UROBILINOGEN, POC UA: NORMAL

## 2022-05-26 PROCEDURE — 3074F PR MOST RECENT SYSTOLIC BLOOD PRESSURE < 130 MM HG: ICD-10-PCS | Mod: CPTII,S$GLB,, | Performed by: OBSTETRICS & GYNECOLOGY

## 2022-05-26 PROCEDURE — 3074F SYST BP LT 130 MM HG: CPT | Mod: CPTII,S$GLB,, | Performed by: OBSTETRICS & GYNECOLOGY

## 2022-05-26 PROCEDURE — 99999 PR PBB SHADOW E&M-EST. PATIENT-LVL IV: CPT | Mod: PBBFAC,,, | Performed by: OBSTETRICS & GYNECOLOGY

## 2022-05-26 PROCEDURE — 3078F PR MOST RECENT DIASTOLIC BLOOD PRESSURE < 80 MM HG: ICD-10-PCS | Mod: CPTII,S$GLB,, | Performed by: OBSTETRICS & GYNECOLOGY

## 2022-05-26 PROCEDURE — 99999 PR PBB SHADOW E&M-EST. PATIENT-LVL IV: ICD-10-PCS | Mod: PBBFAC,,, | Performed by: OBSTETRICS & GYNECOLOGY

## 2022-05-26 PROCEDURE — 81002 POCT URINE DIPSTICK WITHOUT MICROSCOPE: ICD-10-PCS | Mod: S$GLB,,, | Performed by: OBSTETRICS & GYNECOLOGY

## 2022-05-26 PROCEDURE — 3008F BODY MASS INDEX DOCD: CPT | Mod: CPTII,S$GLB,, | Performed by: OBSTETRICS & GYNECOLOGY

## 2022-05-26 PROCEDURE — 1160F RVW MEDS BY RX/DR IN RCRD: CPT | Mod: CPTII,S$GLB,, | Performed by: OBSTETRICS & GYNECOLOGY

## 2022-05-26 PROCEDURE — 99214 PR OFFICE/OUTPT VISIT, EST, LEVL IV, 30-39 MIN: ICD-10-PCS | Mod: S$GLB,,, | Performed by: OBSTETRICS & GYNECOLOGY

## 2022-05-26 PROCEDURE — 3008F PR BODY MASS INDEX (BMI) DOCUMENTED: ICD-10-PCS | Mod: CPTII,S$GLB,, | Performed by: OBSTETRICS & GYNECOLOGY

## 2022-05-26 PROCEDURE — 1159F PR MEDICATION LIST DOCUMENTED IN MEDICAL RECORD: ICD-10-PCS | Mod: CPTII,S$GLB,, | Performed by: OBSTETRICS & GYNECOLOGY

## 2022-05-26 PROCEDURE — 81025 POCT URINE PREGNANCY: ICD-10-PCS | Mod: S$GLB,,, | Performed by: OBSTETRICS & GYNECOLOGY

## 2022-05-26 PROCEDURE — 81002 URINALYSIS NONAUTO W/O SCOPE: CPT | Mod: S$GLB,,, | Performed by: OBSTETRICS & GYNECOLOGY

## 2022-05-26 PROCEDURE — 81025 URINE PREGNANCY TEST: CPT | Mod: S$GLB,,, | Performed by: OBSTETRICS & GYNECOLOGY

## 2022-05-26 PROCEDURE — 3078F DIAST BP <80 MM HG: CPT | Mod: CPTII,S$GLB,, | Performed by: OBSTETRICS & GYNECOLOGY

## 2022-05-26 PROCEDURE — 1159F MED LIST DOCD IN RCRD: CPT | Mod: CPTII,S$GLB,, | Performed by: OBSTETRICS & GYNECOLOGY

## 2022-05-26 PROCEDURE — 1160F PR REVIEW ALL MEDS BY PRESCRIBER/CLIN PHARMACIST DOCUMENTED: ICD-10-PCS | Mod: CPTII,S$GLB,, | Performed by: OBSTETRICS & GYNECOLOGY

## 2022-05-26 PROCEDURE — 99214 OFFICE O/P EST MOD 30 MIN: CPT | Mod: S$GLB,,, | Performed by: OBSTETRICS & GYNECOLOGY

## 2022-05-26 NOTE — PROGRESS NOTES
"CC:dyspareunia  Pelvic pain     Jessica Garcia is a 31 y.o. female  presents for pelvic pain . It is point tenderness in the lower mid pelvis.  Vaginal pain.   NO vaginal discharge.   SHe has been on OCPs since age 17.  Pain just started this year.  OCPs have not prevented the pain   Pain is not bad enough to take motrin or NSAIDs  She had an ovarian cyst in       Past Medical History:   Diagnosis Date    Herniation of lumbar intervertebral disc     Hypercholesteremia     Multiple sclerosis 2021    Tachycardia        History reviewed. No pertinent surgical history.    OB History    Para Term  AB Living   0 0 0 0 0 0   SAB IAB Ectopic Multiple Live Births   0 0 0 0     Obstetric Comments   Menarche age 13.    Menses absent on OCPs.   History of abnormal PAP smear: NO.   History of sexually transmitted disease:  NO          Family History   Problem Relation Age of Onset    Thyroid disease Mother     Hypertension Sister     Heart disease Maternal Grandfather     Hypertension Paternal Grandmother     Hyperlipidemia Sister     Melanoma Neg Hx     Breast cancer Neg Hx     Colon cancer Neg Hx     Ovarian cancer Neg Hx        Social History     Tobacco Use    Smoking status: Never Smoker    Smokeless tobacco: Never Used   Substance Use Topics    Alcohol use: Yes     Alcohol/week: 1.0 standard drink     Types: 1 Glasses of wine per week    Drug use: No       /76   Ht 5' 5" (1.651 m)   Wt 79.9 kg (176 lb 2.4 oz)   LMP  (LMP Unknown)   BMI 29.31 kg/m²     ROS:  GENERAL: Denies weight gain or weight loss. Feeling well overall.   SKIN: Denies rash or lesions.   HEAD: Denies head injury or headache.   NODES: Denies enlarged lymph nodes.   CHEST: Denies chest pain or shortness of breath.   CARDIOVASCULAR: Denies palpitations or left sided chest pain.   ABDOMEN: No abdominal pain, constipation, diarrhea, nausea, vomiting or rectal bleeding.   URINARY: No frequency, " dysuria, hematuria, or burning on urination.  REPRODUCTIVE: See HPI.   BREASTS: The patient performs breast self-examination and denies pain, lumps, or nipple discharge.   HEMATOLOGIC: No easy bruisability or excessive bleeding.  MUSCULOSKELETAL: Denies joint pain or swelling.   NEUROLOGIC: Denies syncope or weakness.   PSYCHIATRIC: Denies depression, anxiety or mood swings.    Physical Exam:    APPEARANCE: Well nourished, well developed, in no acute distress.  AFFECT: WNL, alert and oriented x 3  SKIN: No acne or hirsutism  NECK: Neck symmetric without masses or thyromegaly  NODES: No inguinal, cervical, axillary, or femoral lymph node enlargement  CHEST: Good respiratory effect  ABDOMEN: Soft.  No tenderness or masses.  No hepatosplenomegaly.  No hernias.  PELVIC: Normal external genitalia without lesions.  Any pressure or manipulation the patient could not allow for insertion of the speculum or digital exam because of vaginismus  Normal hair distribution.    SHE DID NOT ALLOW FOR A SPECULUM exam due to pelvic floor tension and pain with any pressure   EXTREMITIES: No edema.    ASSESSMENT AND PLAN  1. Pelvic pain in female  POCT Urine Pregnancy    C. trachomatis/N. gonorrhoeae by AMP DNA [facility]; Vagina    US Pelvis Comp with Transvag NON-OB (xpd    Ambulatory referral/consult to Physical/Occupational Therapy   2. Vaginismus  Ambulatory referral/consult to Physical/Occupational Therapy     UA and uPT negative  Relaxation techniques  Dilators    Patient was counseled today on A.C.S. Pap guidelines and recommendations for yearly pelvic exams, mammograms and monthly self breast exams; to see her PCP for other health maintenance.   Follow up in three months after PT to see if she will be able to tolerate an exam

## 2022-06-24 ENCOUNTER — PATIENT MESSAGE (OUTPATIENT)
Dept: PSYCHIATRY | Facility: CLINIC | Age: 32
End: 2022-06-24
Payer: COMMERCIAL

## 2022-06-27 ENCOUNTER — OFFICE VISIT (OUTPATIENT)
Dept: NEUROLOGY | Facility: CLINIC | Age: 32
End: 2022-06-27
Payer: COMMERCIAL

## 2022-06-27 VITALS
HEIGHT: 65 IN | SYSTOLIC BLOOD PRESSURE: 112 MMHG | DIASTOLIC BLOOD PRESSURE: 72 MMHG | HEART RATE: 103 BPM | BODY MASS INDEX: 29.79 KG/M2 | WEIGHT: 178.81 LBS

## 2022-06-27 DIAGNOSIS — Z79.899 HIGH RISK MEDICATION USE: ICD-10-CM

## 2022-06-27 DIAGNOSIS — Z71.89 COUNSELING REGARDING GOALS OF CARE: ICD-10-CM

## 2022-06-27 DIAGNOSIS — Z29.89 PROPHYLACTIC IMMUNOTHERAPY: ICD-10-CM

## 2022-06-27 DIAGNOSIS — G35 MULTIPLE SCLEROSIS: Primary | ICD-10-CM

## 2022-06-27 DIAGNOSIS — R68.82 LOW LIBIDO: ICD-10-CM

## 2022-06-27 PROCEDURE — 3078F PR MOST RECENT DIASTOLIC BLOOD PRESSURE < 80 MM HG: ICD-10-PCS | Mod: CPTII,S$GLB,, | Performed by: CLINICAL NURSE SPECIALIST

## 2022-06-27 PROCEDURE — 3074F SYST BP LT 130 MM HG: CPT | Mod: CPTII,S$GLB,, | Performed by: CLINICAL NURSE SPECIALIST

## 2022-06-27 PROCEDURE — 3078F DIAST BP <80 MM HG: CPT | Mod: CPTII,S$GLB,, | Performed by: CLINICAL NURSE SPECIALIST

## 2022-06-27 PROCEDURE — 99999 PR PBB SHADOW E&M-EST. PATIENT-LVL III: ICD-10-PCS | Mod: PBBFAC,,, | Performed by: CLINICAL NURSE SPECIALIST

## 2022-06-27 PROCEDURE — 3008F PR BODY MASS INDEX (BMI) DOCUMENTED: ICD-10-PCS | Mod: CPTII,S$GLB,, | Performed by: CLINICAL NURSE SPECIALIST

## 2022-06-27 PROCEDURE — 99215 PR OFFICE/OUTPT VISIT, EST, LEVL V, 40-54 MIN: ICD-10-PCS | Mod: S$GLB,,, | Performed by: CLINICAL NURSE SPECIALIST

## 2022-06-27 PROCEDURE — 99215 OFFICE O/P EST HI 40 MIN: CPT | Mod: S$GLB,,, | Performed by: CLINICAL NURSE SPECIALIST

## 2022-06-27 PROCEDURE — 99999 PR PBB SHADOW E&M-EST. PATIENT-LVL III: CPT | Mod: PBBFAC,,, | Performed by: CLINICAL NURSE SPECIALIST

## 2022-06-27 PROCEDURE — 1159F PR MEDICATION LIST DOCUMENTED IN MEDICAL RECORD: ICD-10-PCS | Mod: CPTII,S$GLB,, | Performed by: CLINICAL NURSE SPECIALIST

## 2022-06-27 PROCEDURE — 1159F MED LIST DOCD IN RCRD: CPT | Mod: CPTII,S$GLB,, | Performed by: CLINICAL NURSE SPECIALIST

## 2022-06-27 PROCEDURE — 3074F PR MOST RECENT SYSTOLIC BLOOD PRESSURE < 130 MM HG: ICD-10-PCS | Mod: CPTII,S$GLB,, | Performed by: CLINICAL NURSE SPECIALIST

## 2022-06-27 PROCEDURE — 3008F BODY MASS INDEX DOCD: CPT | Mod: CPTII,S$GLB,, | Performed by: CLINICAL NURSE SPECIALIST

## 2022-06-27 NOTE — PROGRESS NOTES
"Subjective:          Patient ID: Jessica Garcia is a 31 y.o. female who presents today for a routine clinic visit for MS. She was last seen by Dr. King in March 2022. The history has been provided by the patient.        MS HPI:  · DMT: ocrelizumab last infused 3/28/22; due next in September   · Side effects from DMT? No  · Taking vitamin D3 as recommended? Yes -  Dose: 50,000 units weekly   · She continues to have low libido. When she is sexually active, she is able to orgasm; however, she has little desire to have sex.   · She denies any bladder issues. She has chronic constipation.    · She is having some congestion today and feels like her "head is a in a jar of water." She was exposed to lots of dust yesterday.   · She has had some intermittent headaches to the right temple. She will feel a twinge there at times.   · She has not had any numbness to the right side since January.    · She denies any signs of relapse.   · She had a "depressive episode" recently when she was stressed with work. She felt very overwhelmed. She has experienced anxiety.   · She works full-time as a counselor.   · She has not been exercising lately. She's gained about 5-7 lbs in the past few months.     Medications:  Current Outpatient Medications   Medication Sig    gabapentin (NEURONTIN) 600 MG tablet Take 600 mg by mouth every evening.    hydrocortisone 2.5 % cream Apply topically 2 (two) times daily.    LO LOESTRIN FE 1 mg-10 mcg (24)/10 mcg (2) Tab Take 1 tablet by mouth once daily.    ocrelizumab (OCREVUS IV) Inject into the vein.    polyethylene glycol (GLYCOLAX) 17 gram/dose powder Take 17 g by mouth once daily. (Patient not taking: Reported on 5/26/2022)    triamcinolone acetonide 0.1% (KENALOG) 0.1 % cream Apply to affected areas of body BID prn rash. Do not use on face, underarms, or groin.     SOCIAL HISTORY  Social History     Tobacco Use    Smoking status: Never Smoker    Smokeless tobacco: Never Used "   Substance Use Topics    Alcohol use: Yes     Alcohol/week: 1.0 standard drink     Types: 1 Glasses of wine per week    Drug use: No       Living arrangements - the patient lives with their spouse.    ROS:  REVIEW OF SYMPTOMS 6/26/2022   Do you feel abnormally tired on most days? Yes--some fatigue at times, but she can push through it    Do you feel you generally sleep well? Yes   Do you have difficulty controlling your bladder?  No   Do you have difficulty controlling your bowels?  No--chronic constipation--BM 3 times a week; does not have to strain much. She is thinking about doing a colonic.    Do you have frequent muscle cramps, tightness or spasms in your limbs?  No   Do you have new visual symptoms?  No--on 5/31/22, she had an ocular migraine (crescent moon) that lasted 20 minutes. She will get this every 3-4 weeks. She has identified some triggers for this.    Do you have worsening difficulty with your memory or thinking? No   Do you have worsening symptoms of anxiety or depression?  No--as above; had depressive episode on 6/10-6/13. She was able to function through this, but had crying spells.    For patients who walk, Do you have more difficulty walking?  No   Have you fallen since your last visit?  No   For patients who use wheelchairs: Do you have any skin wounds or breakdown? Not Applicable   Do you have difficulty using your hands?  No   Do you have shooting or burning pain? No   Do you have difficulty with sexual function?  Yes--as above    If you are sexually active, are you using birth control? Y/N  N/A Yes   Do you often choke when swallowing liquids or solid food?  No--had some issues swallowing sticky red beans and rice    Do you experience worsening symptoms when overheated? No--she gets overheated easily, but it does not make any of her MS symptoms worse. She sweats more easily and more often.    Do you need any new equipment such as a wheelchair, walker or shower chair? No   Do you receive  co-pay financial assistance for your principal MS medicine? No--has copay assistance    Would you be interested in participating in an MS research trial in the future? Yes   For patients on Gilenya, Tecfidera, Aubagio, Rituxan, Ocrevus, Tysabri, Lemtrada or Methotrexate, are you aware that you should NOT receive live virus vaccines?  Yes   Do you feel you have adequate family/friend support?  Yes   Do you have health insurance?   Yes   Are you currently employed? Yes   Do you receive SSDI/SSI?  Not Applicable   Do you use marijuana or cannabis products? No   Have you been diagnosed with a urinary tract infection since your last visit here? No   Have you been diagnosed with a respiratory tract infection since your last visit here? No   Have you been to the emergency room since your last visit here? No   Have you been hospitalized since your last visit here?  No              Objective:        1. 25 foot timed walk:  Timed 25 Foot Walk: 4/12/2021 6/27/2022   Did patient wear an AFO? No No   Was assistive device used? No No   Time for 25 Foot Walk (seconds) 4.26 3.7   Time for 25 Foot Walk (seconds) 4.46 4       Neurologic Exam     Mental Status   Oriented to person, place, and time.   Attention: normal. Concentration: normal.   Speech: speech is normal   Level of consciousness: alert  Knowledge: good.   Normal comprehension.     Cranial Nerves     CN II   Visual acuity: normal    CN III, IV, VI   Pupils are equal, round, and reactive to light.  Extraocular motions are normal.   Right pupil: Shape: regular. Reactivity: brisk.   Left pupil: Shape: regular. Reactivity: brisk.   CN III: no CN III palsy  CN VI: no CN VI palsy  Nystagmus: none     CN V   Right facial sensation deficit: none  Left facial sensation deficit: none    CN VII   Right facial weakness: none  Left facial weakness: none    CN VIII   Hearing: intact    CN IX, X   Palate: symmetric    CN XI   Right sternocleidomastoid strength: normal  Left  sternocleidomastoid strength: normal  Right trapezius strength: normal  Left trapezius strength: normal    CN XII   Tongue deviation: none    Motor Exam   Muscle bulk: normal  Overall muscle tone: normal    Strength   Strength 5/5 throughout.   Right neck flexion: 5/5  Left neck flexion: 5/5  Right neck extension: 5/5  Left neck extension: 5/5  Right deltoid: 5/5  Left deltoid: 5/5  Right biceps: 5/5  Left biceps: 5/5  Right triceps: 5/5  Left triceps: 5/5  Right wrist flexion: 5/5  Left wrist flexion: 5/5  Right wrist extension: 5/5  Left wrist extension: 5/5  Right interossei: 5/5  Left interossei: 5/5  Right iliopsoas: 5/5  Left iliopsoas: 5/5  Right quadriceps: 5/5  Left quadriceps: 5/5  Right hamstrin/5  Left hamstrin/5  Right anterior tibial: 5/5  Left anterior tibial: 5/5  Right gastroc: 5/5  Left gastroc: 5/5    Sensory Exam   Right arm light touch: normal  Left arm light touch: normal  Right leg light touch: normal  Left leg light touch: normal  Right arm vibration: normal  Left arm vibration: normal  Right leg vibration: normal  Left leg vibration: normal    Gait, Coordination, and Reflexes     Gait  Gait: normal    Coordination   Romberg: negative  Finger to nose coordination: normal  Heel to shin coordination: normal  Tandem walking coordination: normal    Tremor   Resting tremor: absent    Reflexes   Right brachioradialis: 2+  Left brachioradialis: 2+  Right biceps: 2+  Left biceps: 2+  Right triceps: 2+  Left triceps: 2+  Right patellar: 2+  Left patellar: 2+  Right achilles: 2+  Left achilles: 2+  Right plantar: normal  Left plantar: normal  She can walk on toes and heels and hop on each foot ten times.   Normal RSM in UE and LE         Imaging:       Results for orders placed during the hospital encounter of 22    MRI Brain Demyelinating W W/O Contrast    Impression  Continued scattered T2 FLAIR lesions throughout the brain parenchyma concerning for mild degree of prior demyelinating  plaque burden.    Resolution of previous enhancement associated with lesion in the left splenium of the corpus callosum.  No definite new lesion or current enhancing lesion to suggest significant interval or active demyelination.  Clinical correlation and follow-up advised.      Electronically signed by: Andrés Martinez DO  Date:    02/05/2022  Time:    13:22    Results for orders placed during the hospital encounter of 02/05/22    MRI Cervical Spine Demyelinating W W/O Contrast    Impression  No significant change from prior specifically without evidence for new cord signal abnormality or abnormal intrathecal enhancement to suggest active cervical demyelination.    Please see above for additional details.      Electronically signed by: Andrés Martinez DO  Date:    02/05/2022  Time:    13:25    Results for orders placed during the hospital encounter of 11/16/20    MRI Thoracic Spine Demyelinating W W/O Contrast    Impression  Brain appears stable from recent examination again demonstrating several scattered T2/FLAIR hyperintense lesions in the supratentorial and infratentorial white matter.  Distribution nonspecific, but remains suspicious for demyelinating disease.  Alternate less common differential considerations to include Lyme disease, prior vascular insult, or other inflammatory process.  No enhancing foci on postcontrast imaging to specifically indicate active demyelination.  Clinical correlation required.    Spinal cord appears within normal limits.  No focal lesions or enhancement to suggest recent or remote demyelination.    1.7 cm T2 hyperintense nodule in the right lobe of the thyroid gland.  Recommend thyroid ultrasound for further characterization.      Electronically signed by: Ariel Nolan MD  Date:    11/17/2020  Time:    07:44        Labs:     Lab Results   Component Value Date    IAXBLAXG18RV 71 02/17/2022    QMXEKQHF06NT 82 04/05/2021    DYXAXXKH86ER 42 11/17/2020     Lab Results   Component Value  Date    JCVINDEX 3.79 (H) 07/09/2021    JCVANTIBODY POSITIVE (A) 07/09/2021     Lab Results   Component Value Date    ND9AKGIG 82.0 07/09/2021    ABSOLUTECD3 1782 07/09/2021    BN6DYTSK 27.2 07/09/2021    ABSOLUTECD8 590 07/09/2021    PV0SUJXA 55.4 07/09/2021    ABSOLUTECD4 1205 07/09/2021    LABCD48 2.04 07/09/2021     Lab Results   Component Value Date    WBC 4.97 02/17/2022    RBC 4.27 02/17/2022    HGB 13.3 02/17/2022    HCT 41.1 02/17/2022    MCV 96 02/17/2022    MCH 31.1 (H) 02/17/2022    MCHC 32.4 02/17/2022    RDW 12.7 02/17/2022     02/17/2022    MPV 10.1 02/17/2022    GRAN 2.2 02/17/2022    GRAN 44.2 02/17/2022    LYMPH 2.2 02/17/2022    LYMPH 43.9 02/17/2022    MONO 0.4 02/17/2022    MONO 8.5 02/17/2022    EOS 0.1 02/17/2022    BASO 0.05 02/17/2022    EOSINOPHIL 2.2 02/17/2022    BASOPHIL 1.0 02/17/2022     Sodium   Date Value Ref Range Status   02/17/2022 139 136 - 145 mmol/L Final     Potassium   Date Value Ref Range Status   02/17/2022 4.3 3.5 - 5.1 mmol/L Final     Chloride   Date Value Ref Range Status   02/17/2022 107 95 - 110 mmol/L Final     CO2   Date Value Ref Range Status   02/17/2022 26 23 - 29 mmol/L Final     Glucose   Date Value Ref Range Status   02/17/2022 90 70 - 110 mg/dL Final     BUN   Date Value Ref Range Status   02/17/2022 11 6 - 20 mg/dL Final     Creatinine   Date Value Ref Range Status   02/17/2022 0.7 0.5 - 1.4 mg/dL Final     Calcium   Date Value Ref Range Status   02/17/2022 9.2 8.7 - 10.5 mg/dL Final     Total Protein   Date Value Ref Range Status   02/17/2022 7.4 6.0 - 8.4 g/dL Final     Albumin   Date Value Ref Range Status   02/17/2022 3.5 3.5 - 5.2 g/dL Final     Total Bilirubin   Date Value Ref Range Status   02/17/2022 0.5 0.1 - 1.0 mg/dL Final     Comment:     For infants and newborns, interpretation of results should be based  on gestational age, weight and in agreement with clinical  observations.    Premature Infant recommended reference ranges:  Up to 24  hours.............<8.0 mg/dL  Up to 48 hours............<12.0 mg/dL  3-5 days..................<15.0 mg/dL  6-29 days.................<15.0 mg/dL       Alkaline Phosphatase   Date Value Ref Range Status   02/17/2022 26 (L) 55 - 135 U/L Final     AST   Date Value Ref Range Status   02/17/2022 20 10 - 40 U/L Final     ALT   Date Value Ref Range Status   02/17/2022 17 10 - 44 U/L Final     Anion Gap   Date Value Ref Range Status   02/17/2022 6 (L) 8 - 16 mmol/L Final     eGFR if    Date Value Ref Range Status   02/17/2022 >60.0 >60 mL/min/1.73 m^2 Final     eGFR if non    Date Value Ref Range Status   02/17/2022 >60.0 >60 mL/min/1.73 m^2 Final     Comment:     Calculation used to obtain the estimated glomerular filtration  rate (eGFR) is the CKD-EPI equation.        Lab Results   Component Value Date    HEPBSAG Negative 02/17/2022    HEPBSAB Positive 02/17/2022    HEPBCAB Negative 02/17/2022           MS Impression and Plan:     NEURO MULTIPLE SCLEROSIS IMPRESSION:   MS Status:     Number of relapses in the past year?:  0    Clinical Progression:  Clinically Stable    MRI Progression:  Stable  Plan:     DMT:  No change in management    DMT comment:  Continue Ocrevus and Vitamin D. Her next infusion is due in September. She is aware of the risks associated with immunosuppressant therapy, including increased risk of infection.     Discussed COVID vaccine schedule for immunosuppressed individuals and Evusheld.    Symptom Management:  Implement change in symptom management    Implement Change in Symptom Management:  Sexual Dysfunction (Will refer to urogynecology for workup of low libido)         MRI brain and spine are due in February. These can be ordered at the next visit.   She will follow up with Dr. Arroyo in 4 months.       JUVENAL Javed, CNS     Total time spent with patient: 32 minutes   Total time spent on encounter: 40 minutes     Problem List Items Addressed This Visit         Neurologic Problems    Multiple sclerosis - Primary    Relevant Orders    Immunoglobulins (IgG, IgA, IgM) Quantitative    CBC auto differential    Hepatitis B Surface Antigen    Hepatitis B Surface Ab, Qualitative    Hepatitis B Core Antibody, Total    Ambulatory referral/consult to Urogynecology      Other Visit Diagnoses     Low libido        Relevant Orders    Ambulatory referral/consult to Urogynecology    Counseling regarding goals of care        Prophylactic immunotherapy        High risk medication use

## 2022-06-29 ENCOUNTER — PATIENT MESSAGE (OUTPATIENT)
Dept: NEUROLOGY | Facility: CLINIC | Age: 32
End: 2022-06-29
Payer: COMMERCIAL

## 2022-06-29 DIAGNOSIS — U07.1 COVID: Primary | ICD-10-CM

## 2022-06-29 DIAGNOSIS — D84.9 IMMUNOSUPPRESSION: ICD-10-CM

## 2022-06-29 NOTE — ADDENDUM NOTE
Addended by: SEBASTIEN WEBER on: 6/29/2022 03:59 PM     Modules accepted: Orders    
Duration Of Freeze Thaw-Cycle (Seconds): 0
Render Post-Care Instructions In Note?: no
Consent: The patient's consent was obtained including but not limited to risks of crusting, scabbing, blistering, scarring, darker or lighter pigmentary change, recurrence, incomplete removal and infection.
Number Of Freeze-Thaw Cycles: 1 freeze-thaw cycle
Detail Level: Detailed
Post-Care Instructions: I reviewed with the patient in detail post-care instructions. Patient is to wear sunprotection, and avoid picking at any of the treated lesions. Pt may apply Vaseline to crusted or scabbing areas. Wound care instruction handout given.

## 2022-07-18 ENCOUNTER — PATIENT MESSAGE (OUTPATIENT)
Dept: FAMILY MEDICINE | Facility: CLINIC | Age: 32
End: 2022-07-18
Payer: COMMERCIAL

## 2022-07-20 ENCOUNTER — PATIENT MESSAGE (OUTPATIENT)
Dept: OBSTETRICS AND GYNECOLOGY | Facility: CLINIC | Age: 32
End: 2022-07-20
Payer: COMMERCIAL

## 2022-07-28 ENCOUNTER — HOSPITAL ENCOUNTER (OUTPATIENT)
Dept: RADIOLOGY | Facility: OTHER | Age: 32
Discharge: HOME | End: 2022-07-28
Attending: OBSTETRICS & GYNECOLOGY
Payer: COMMERCIAL

## 2022-07-28 DIAGNOSIS — R10.2 PELVIC PAIN IN FEMALE: ICD-10-CM

## 2022-07-28 PROCEDURE — 76830 TRANSVAGINAL US NON-OB: CPT | Mod: 26,,, | Performed by: RADIOLOGY

## 2022-07-28 PROCEDURE — 76830 US PELVIS COMP WITH TRANSVAG NON-OB (XPD): ICD-10-PCS | Mod: 26,,, | Performed by: RADIOLOGY

## 2022-07-28 PROCEDURE — 76856 US EXAM PELVIC COMPLETE: CPT | Mod: TC

## 2022-07-28 PROCEDURE — 76856 US PELVIS COMP WITH TRANSVAG NON-OB (XPD): ICD-10-PCS | Mod: 26,,, | Performed by: RADIOLOGY

## 2022-07-28 PROCEDURE — 76856 US EXAM PELVIC COMPLETE: CPT | Mod: 26,,, | Performed by: RADIOLOGY

## 2022-07-29 ENCOUNTER — PATIENT MESSAGE (OUTPATIENT)
Dept: OBSTETRICS AND GYNECOLOGY | Facility: CLINIC | Age: 32
End: 2022-07-29
Payer: COMMERCIAL

## 2022-08-01 ENCOUNTER — PATIENT MESSAGE (OUTPATIENT)
Dept: FAMILY MEDICINE | Facility: CLINIC | Age: 32
End: 2022-08-01
Payer: COMMERCIAL

## 2022-08-01 DIAGNOSIS — Z30.9 ENCOUNTER FOR CONTRACEPTIVE MANAGEMENT, UNSPECIFIED TYPE: ICD-10-CM

## 2022-08-01 NOTE — TELEPHONE ENCOUNTER
No new care gaps identified.  [facility] Embedded Care Gaps. Reference number: [id number]. 8/01/2022   2:52:54 PM CDT

## 2022-08-02 RX ORDER — NORETHINDRONE ACETATE AND ETHINYL ESTRADIOL, ETHINYL ESTRADIOL AND FERROUS FUMARATE 1MG-10(24)
1 KIT ORAL DAILY
Qty: 84 TABLET | Refills: 4 | Status: SHIPPED | OUTPATIENT
Start: 2022-08-02 | End: 2022-08-04 | Stop reason: SDUPTHER

## 2022-08-03 ENCOUNTER — LAB VISIT (OUTPATIENT)
Dept: LAB | Facility: HOSPITAL | Age: 32
End: 2022-08-03
Attending: FAMILY MEDICINE
Payer: COMMERCIAL

## 2022-08-03 ENCOUNTER — TELEPHONE (OUTPATIENT)
Dept: OBSTETRICS AND GYNECOLOGY | Facility: CLINIC | Age: 32
End: 2022-08-03
Payer: COMMERCIAL

## 2022-08-03 DIAGNOSIS — G35 MULTIPLE SCLEROSIS: ICD-10-CM

## 2022-08-03 LAB
BASOPHILS # BLD AUTO: 0.07 K/UL (ref 0–0.2)
BASOPHILS NFR BLD: 1.2 % (ref 0–1.9)
DIFFERENTIAL METHOD: ABNORMAL
EOSINOPHIL # BLD AUTO: 0.1 K/UL (ref 0–0.5)
EOSINOPHIL NFR BLD: 2.1 % (ref 0–8)
ERYTHROCYTE [DISTWIDTH] IN BLOOD BY AUTOMATED COUNT: 13.2 % (ref 11.5–14.5)
HCT VFR BLD AUTO: 39.8 % (ref 37–48.5)
HGB BLD-MCNC: 13.4 G/DL (ref 12–16)
IGA SERPL-MCNC: 340 MG/DL (ref 40–350)
IGG SERPL-MCNC: 1267 MG/DL (ref 650–1600)
IGM SERPL-MCNC: 49 MG/DL (ref 50–300)
IMM GRANULOCYTES # BLD AUTO: 0.02 K/UL (ref 0–0.04)
IMM GRANULOCYTES NFR BLD AUTO: 0.3 % (ref 0–0.5)
LYMPHOCYTES # BLD AUTO: 2.3 K/UL (ref 1–4.8)
LYMPHOCYTES NFR BLD: 39 % (ref 18–48)
MCH RBC QN AUTO: 32.4 PG (ref 27–31)
MCHC RBC AUTO-ENTMCNC: 33.7 G/DL (ref 32–36)
MCV RBC AUTO: 96 FL (ref 82–98)
MONOCYTES # BLD AUTO: 0.5 K/UL (ref 0.3–1)
MONOCYTES NFR BLD: 9.1 % (ref 4–15)
NEUTROPHILS # BLD AUTO: 2.8 K/UL (ref 1.8–7.7)
NEUTROPHILS NFR BLD: 48.3 % (ref 38–73)
NRBC BLD-RTO: 0 /100 WBC
PLATELET # BLD AUTO: 426 K/UL (ref 150–450)
PMV BLD AUTO: 9.8 FL (ref 9.2–12.9)
RBC # BLD AUTO: 4.13 M/UL (ref 4–5.4)
WBC # BLD AUTO: 5.85 K/UL (ref 3.9–12.7)

## 2022-08-03 PROCEDURE — 82784 ASSAY IGA/IGD/IGG/IGM EACH: CPT | Performed by: CLINICAL NURSE SPECIALIST

## 2022-08-03 PROCEDURE — 86706 HEP B SURFACE ANTIBODY: CPT | Performed by: CLINICAL NURSE SPECIALIST

## 2022-08-03 PROCEDURE — 86704 HEP B CORE ANTIBODY TOTAL: CPT | Performed by: CLINICAL NURSE SPECIALIST

## 2022-08-03 PROCEDURE — 87340 HEPATITIS B SURFACE AG IA: CPT | Performed by: CLINICAL NURSE SPECIALIST

## 2022-08-03 PROCEDURE — 85025 COMPLETE CBC W/AUTO DIFF WBC: CPT | Performed by: CLINICAL NURSE SPECIALIST

## 2022-08-03 PROCEDURE — 36415 COLL VENOUS BLD VENIPUNCTURE: CPT | Mod: PO | Performed by: CLINICAL NURSE SPECIALIST

## 2022-08-03 NOTE — TELEPHONE ENCOUNTER
Spoke with patient , answered question as they pertain to appointment , patient states understanding.

## 2022-08-03 NOTE — TELEPHONE ENCOUNTER
----- Message from Zach Sim sent at 8/3/2022  4:09 PM CDT -----      Name of Who is Calling: MELINDA PEMBERTON [[id number]]      What is the request in detail: Pt called with questions about appt.Please contact to further discuss and advise.          Can the clinic reply by [facility]: N      What Number to Call Back if not in [facility]: [phone]

## 2022-08-04 ENCOUNTER — OFFICE VISIT (OUTPATIENT)
Dept: OBSTETRICS AND GYNECOLOGY | Facility: CLINIC | Age: 32
End: 2022-08-04
Payer: COMMERCIAL

## 2022-08-04 ENCOUNTER — PATIENT MESSAGE (OUTPATIENT)
Dept: OBSTETRICS AND GYNECOLOGY | Facility: CLINIC | Age: 32
End: 2022-08-04

## 2022-08-04 ENCOUNTER — PATIENT MESSAGE (OUTPATIENT)
Dept: NEUROLOGY | Facility: CLINIC | Age: 32
End: 2022-08-04
Payer: COMMERCIAL

## 2022-08-04 VITALS
SYSTOLIC BLOOD PRESSURE: 118 MMHG | HEIGHT: 65 IN | WEIGHT: 176.38 LBS | DIASTOLIC BLOOD PRESSURE: 70 MMHG | BODY MASS INDEX: 29.38 KG/M2

## 2022-08-04 DIAGNOSIS — R68.82 DECREASED LIBIDO: Primary | ICD-10-CM

## 2022-08-04 LAB
HBV CORE AB SERPL QL IA: NEGATIVE
HBV SURFACE AB SER-ACNC: POSITIVE M[IU]/ML
HBV SURFACE AG SERPL QL IA: NEGATIVE

## 2022-08-04 PROCEDURE — 3078F DIAST BP <80 MM HG: CPT | Mod: CPTII,S$GLB,, | Performed by: OBSTETRICS & GYNECOLOGY

## 2022-08-04 PROCEDURE — 99999 PR PBB SHADOW E&M-EST. PATIENT-LVL III: ICD-10-PCS | Mod: PBBFAC,,, | Performed by: OBSTETRICS & GYNECOLOGY

## 2022-08-04 PROCEDURE — 1159F PR MEDICATION LIST DOCUMENTED IN MEDICAL RECORD: ICD-10-PCS | Mod: CPTII,S$GLB,, | Performed by: OBSTETRICS & GYNECOLOGY

## 2022-08-04 PROCEDURE — 3074F SYST BP LT 130 MM HG: CPT | Mod: CPTII,S$GLB,, | Performed by: OBSTETRICS & GYNECOLOGY

## 2022-08-04 PROCEDURE — 99999 PR PBB SHADOW E&M-EST. PATIENT-LVL III: CPT | Mod: PBBFAC,,, | Performed by: OBSTETRICS & GYNECOLOGY

## 2022-08-04 PROCEDURE — 3008F PR BODY MASS INDEX (BMI) DOCUMENTED: ICD-10-PCS | Mod: CPTII,S$GLB,, | Performed by: OBSTETRICS & GYNECOLOGY

## 2022-08-04 PROCEDURE — 99213 PR OFFICE/OUTPT VISIT, EST, LEVL III, 20-29 MIN: ICD-10-PCS | Mod: S$GLB,,, | Performed by: OBSTETRICS & GYNECOLOGY

## 2022-08-04 PROCEDURE — 1160F PR REVIEW ALL MEDS BY PRESCRIBER/CLIN PHARMACIST DOCUMENTED: ICD-10-PCS | Mod: CPTII,S$GLB,, | Performed by: OBSTETRICS & GYNECOLOGY

## 2022-08-04 PROCEDURE — 3078F PR MOST RECENT DIASTOLIC BLOOD PRESSURE < 80 MM HG: ICD-10-PCS | Mod: CPTII,S$GLB,, | Performed by: OBSTETRICS & GYNECOLOGY

## 2022-08-04 PROCEDURE — 3074F PR MOST RECENT SYSTOLIC BLOOD PRESSURE < 130 MM HG: ICD-10-PCS | Mod: CPTII,S$GLB,, | Performed by: OBSTETRICS & GYNECOLOGY

## 2022-08-04 PROCEDURE — 99213 OFFICE O/P EST LOW 20 MIN: CPT | Mod: S$GLB,,, | Performed by: OBSTETRICS & GYNECOLOGY

## 2022-08-04 PROCEDURE — 1159F MED LIST DOCD IN RCRD: CPT | Mod: CPTII,S$GLB,, | Performed by: OBSTETRICS & GYNECOLOGY

## 2022-08-04 PROCEDURE — 1160F RVW MEDS BY RX/DR IN RCRD: CPT | Mod: CPTII,S$GLB,, | Performed by: OBSTETRICS & GYNECOLOGY

## 2022-08-04 PROCEDURE — 3008F BODY MASS INDEX DOCD: CPT | Mod: CPTII,S$GLB,, | Performed by: OBSTETRICS & GYNECOLOGY

## 2022-08-04 RX ORDER — MELOXICAM 15 MG/1
15 TABLET ORAL DAILY
COMMUNITY
Start: 2022-07-21 | End: 2023-08-18 | Stop reason: CLARIF

## 2022-08-04 RX ORDER — COVID-19 MOLECULAR TEST ASSAY
KIT MISCELLANEOUS
COMMUNITY
Start: 2022-06-30

## 2022-08-04 NOTE — PROGRESS NOTES
PT HERE WITH MS AND ON OCREVUS IV Q 6 MONTHS.  LAST 2 YRS DECREASED LIBEDO. WAS ON LOLO ESTRIN BUT OFF 2 MONTHS AND NO CHANGE.  NO PAIN WITH INTERCOURSE.    ROS:  GENERAL: No fever, chills, fatigability or weight loss.  VULVAR: No pain, no lesions and no itching.  VAGINAL: No relaxation, no itching, no discharge, no abnormal bleeding and no lesions.  ABDOMEN: No abdominal pain. Denies nausea. Denies vomiting. No diarrhea. No constipation  BREAST: Denies pain. No lumps. No discharge.  URINARY: No incontinence, no nocturia, no frequency and no dysuria.  CARDIOVASCULAR: No chest pain. No shortness of breath. No leg cramps.  NEUROLOGICAL: No headaches. No vision changes.  The remainder of the review of systems was negative.    PE:  General Appearance: overweight And Well developed. Well nourished. In no acute distress.    PROCEDURES:    PLAN:     DIAGNOSIS:  1. Decreased libido        MEDICATIONS & ORDERS:  Orders Placed This Encounter    flibanserin 100 mg Tab       20 min d/w pt on decreased lidido    FOLLOW-UP: With me prn    Atif Anguiano Jr, MD, FACOG

## 2022-08-11 ENCOUNTER — TELEPHONE (OUTPATIENT)
Dept: PHARMACY | Facility: CLINIC | Age: 32
End: 2022-08-11
Payer: COMMERCIAL

## 2022-08-25 ENCOUNTER — TELEPHONE (OUTPATIENT)
Dept: NEUROLOGY | Facility: CLINIC | Age: 32
End: 2022-08-25
Payer: COMMERCIAL

## 2022-10-14 ENCOUNTER — PATIENT MESSAGE (OUTPATIENT)
Dept: NEUROLOGY | Facility: CLINIC | Age: 32
End: 2022-10-14
Payer: COMMERCIAL

## 2022-10-14 DIAGNOSIS — G35 MULTIPLE SCLEROSIS: ICD-10-CM

## 2022-10-14 DIAGNOSIS — M79.2 NEURALGIA: Primary | ICD-10-CM

## 2022-10-16 ENCOUNTER — PATIENT MESSAGE (OUTPATIENT)
Dept: NEUROLOGY | Facility: CLINIC | Age: 32
End: 2022-10-16
Payer: COMMERCIAL

## 2022-10-17 NOTE — TELEPHONE ENCOUNTER
Tylenol Tension headache, 1,000 mg once daily. Work is stress, but this is not new. No sinuses issues. If I turn my head to the right it feels as if a nerve is being pinched.

## 2022-10-17 NOTE — TELEPHONE ENCOUNTER
Hi Leigh,  I just wanted to share that this evening I got a little lightheaded. The headache has persisted through the weekend. Ive been taking two 500 mg Tylenol with 65 my of caffeine, but it hasnt really been helping. Im not sure whats going on. Please let me know your thoughts as to what is happening.  Thank you,  Jessica

## 2022-10-19 RX ORDER — CARBAMAZEPINE 100 MG/1
100 TABLET, EXTENDED RELEASE ORAL 2 TIMES DAILY
Qty: 60 TABLET | Refills: 0 | Status: SHIPPED | OUTPATIENT
Start: 2022-10-19 | End: 2023-01-13

## 2022-10-24 ENCOUNTER — HOSPITAL ENCOUNTER (OUTPATIENT)
Dept: RADIOLOGY | Facility: HOSPITAL | Age: 32
Discharge: HOME | End: 2022-10-24
Attending: CLINICAL NURSE SPECIALIST
Payer: COMMERCIAL

## 2022-10-24 DIAGNOSIS — M79.2 NEURALGIA: ICD-10-CM

## 2022-10-24 DIAGNOSIS — G35 MULTIPLE SCLEROSIS: ICD-10-CM

## 2022-10-24 PROCEDURE — A9585 GADOBUTROL INJECTION: HCPCS | Performed by: CLINICAL NURSE SPECIALIST

## 2022-10-24 PROCEDURE — 70553 MRI BRAIN STEM W/O & W/DYE: CPT | Mod: 26,,, | Performed by: RADIOLOGY

## 2022-10-24 PROCEDURE — 70553 MRI BRAIN STEM W/O & W/DYE: CPT | Mod: TC

## 2022-10-24 PROCEDURE — 25500020 PHARM REV CODE 255: Performed by: CLINICAL NURSE SPECIALIST

## 2022-10-24 PROCEDURE — 70553 MRI BRAIN DEMYELINATING W/ WO CONTRAST: ICD-10-PCS | Mod: 26,,, | Performed by: RADIOLOGY

## 2022-10-24 RX ORDER — GADOBUTROL 604.72 MG/ML
8 INJECTION INTRAVENOUS
Status: COMPLETED | OUTPATIENT
Start: 2022-10-24 | End: 2022-10-24

## 2022-10-24 RX ADMIN — GADOBUTROL 8 ML: 604.72 INJECTION INTRAVENOUS at 07:10

## 2022-10-31 ENCOUNTER — PATIENT MESSAGE (OUTPATIENT)
Dept: NEUROLOGY | Facility: CLINIC | Age: 32
End: 2022-10-31
Payer: COMMERCIAL

## 2022-10-31 DIAGNOSIS — R51.9 PERSISTENT HEADACHES: Primary | ICD-10-CM

## 2022-11-09 ENCOUNTER — PATIENT MESSAGE (OUTPATIENT)
Dept: NEUROLOGY | Facility: CLINIC | Age: 32
End: 2022-11-09
Payer: COMMERCIAL

## 2022-11-11 ENCOUNTER — TELEPHONE (OUTPATIENT)
Dept: NEUROLOGY | Facility: CLINIC | Age: 32
End: 2022-11-11
Payer: COMMERCIAL

## 2022-11-11 NOTE — TELEPHONE ENCOUNTER
Called pt to schedule an appt with Ms. Logan. Pt is scheduled for a virtual appt for Jan 31. Pt has been placed on the wait list as well.     ----- Message from Alysa Gutierrez MA sent at 11/9/2022  6:08 PM CST -----  Regarding: Patient Referral  Shelton hutchins!    Another headache/migraine patient. Referral on her chart. Can you please reach out to be scheduled with one of your providers?    Thanks!

## 2022-11-21 ENCOUNTER — PATIENT MESSAGE (OUTPATIENT)
Dept: NEUROLOGY | Facility: CLINIC | Age: 32
End: 2022-11-21
Payer: COMMERCIAL

## 2022-12-01 ENCOUNTER — PATIENT MESSAGE (OUTPATIENT)
Dept: PSYCHIATRY | Facility: CLINIC | Age: 32
End: 2022-12-01
Payer: COMMERCIAL

## 2022-12-05 ENCOUNTER — PATIENT MESSAGE (OUTPATIENT)
Dept: OBSTETRICS AND GYNECOLOGY | Facility: CLINIC | Age: 32
End: 2022-12-05
Payer: COMMERCIAL

## 2022-12-09 ENCOUNTER — OFFICE VISIT (OUTPATIENT)
Dept: NEUROLOGY | Facility: CLINIC | Age: 32
End: 2022-12-09
Payer: COMMERCIAL

## 2022-12-09 VITALS
WEIGHT: 175.81 LBS | SYSTOLIC BLOOD PRESSURE: 105 MMHG | HEIGHT: 65 IN | BODY MASS INDEX: 29.29 KG/M2 | HEART RATE: 97 BPM | DIASTOLIC BLOOD PRESSURE: 65 MMHG

## 2022-12-09 DIAGNOSIS — G35 MULTIPLE SCLEROSIS: Primary | ICD-10-CM

## 2022-12-09 PROCEDURE — 3078F DIAST BP <80 MM HG: CPT | Mod: CPTII,S$GLB,, | Performed by: STUDENT IN AN ORGANIZED HEALTH CARE EDUCATION/TRAINING PROGRAM

## 2022-12-09 PROCEDURE — 3078F PR MOST RECENT DIASTOLIC BLOOD PRESSURE < 80 MM HG: ICD-10-PCS | Mod: CPTII,S$GLB,, | Performed by: STUDENT IN AN ORGANIZED HEALTH CARE EDUCATION/TRAINING PROGRAM

## 2022-12-09 PROCEDURE — 99214 OFFICE O/P EST MOD 30 MIN: CPT | Mod: S$GLB,,, | Performed by: STUDENT IN AN ORGANIZED HEALTH CARE EDUCATION/TRAINING PROGRAM

## 2022-12-09 PROCEDURE — 99999 PR PBB SHADOW E&M-EST. PATIENT-LVL IV: ICD-10-PCS | Mod: PBBFAC,,, | Performed by: STUDENT IN AN ORGANIZED HEALTH CARE EDUCATION/TRAINING PROGRAM

## 2022-12-09 PROCEDURE — 3008F PR BODY MASS INDEX (BMI) DOCUMENTED: ICD-10-PCS | Mod: CPTII,S$GLB,, | Performed by: STUDENT IN AN ORGANIZED HEALTH CARE EDUCATION/TRAINING PROGRAM

## 2022-12-09 PROCEDURE — 3074F SYST BP LT 130 MM HG: CPT | Mod: CPTII,S$GLB,, | Performed by: STUDENT IN AN ORGANIZED HEALTH CARE EDUCATION/TRAINING PROGRAM

## 2022-12-09 PROCEDURE — 3008F BODY MASS INDEX DOCD: CPT | Mod: CPTII,S$GLB,, | Performed by: STUDENT IN AN ORGANIZED HEALTH CARE EDUCATION/TRAINING PROGRAM

## 2022-12-09 PROCEDURE — 99999 PR PBB SHADOW E&M-EST. PATIENT-LVL IV: CPT | Mod: PBBFAC,,, | Performed by: STUDENT IN AN ORGANIZED HEALTH CARE EDUCATION/TRAINING PROGRAM

## 2022-12-09 PROCEDURE — 1160F PR REVIEW ALL MEDS BY PRESCRIBER/CLIN PHARMACIST DOCUMENTED: ICD-10-PCS | Mod: CPTII,S$GLB,, | Performed by: STUDENT IN AN ORGANIZED HEALTH CARE EDUCATION/TRAINING PROGRAM

## 2022-12-09 PROCEDURE — 99214 PR OFFICE/OUTPT VISIT, EST, LEVL IV, 30-39 MIN: ICD-10-PCS | Mod: S$GLB,,, | Performed by: STUDENT IN AN ORGANIZED HEALTH CARE EDUCATION/TRAINING PROGRAM

## 2022-12-09 PROCEDURE — 1159F MED LIST DOCD IN RCRD: CPT | Mod: CPTII,S$GLB,, | Performed by: STUDENT IN AN ORGANIZED HEALTH CARE EDUCATION/TRAINING PROGRAM

## 2022-12-09 PROCEDURE — 3074F PR MOST RECENT SYSTOLIC BLOOD PRESSURE < 130 MM HG: ICD-10-PCS | Mod: CPTII,S$GLB,, | Performed by: STUDENT IN AN ORGANIZED HEALTH CARE EDUCATION/TRAINING PROGRAM

## 2022-12-09 PROCEDURE — 1159F PR MEDICATION LIST DOCUMENTED IN MEDICAL RECORD: ICD-10-PCS | Mod: CPTII,S$GLB,, | Performed by: STUDENT IN AN ORGANIZED HEALTH CARE EDUCATION/TRAINING PROGRAM

## 2022-12-09 PROCEDURE — 1160F RVW MEDS BY RX/DR IN RCRD: CPT | Mod: CPTII,S$GLB,, | Performed by: STUDENT IN AN ORGANIZED HEALTH CARE EDUCATION/TRAINING PROGRAM

## 2022-12-09 NOTE — PROGRESS NOTES
[facility]  Follow Up Patient Visit      Disease Summary     Principle neurological diagnosis: MS    Date of symptom onset: 2016  Date of diagnosis: 2020  Disease type at diagnosis: CIS  Disease type currently: RRMS  Previous therapy: DMF 1/2021 - 9/2021 (disease breakthrough)  Current therapy: Ocrevus Oct 2021 - present   Last MRI Brain: 10/24/22 stable  Last MRI C-spine: 2/5/22  Last MRI T-spine: 11/6/20  CSF 12/17/20: 8wbc, 56rbc, 51glu, 20pro, 9 unique ocb's, elevated KFLC's,   JCV status: 7/2021 3.79  Other relevant labs and tests: NA    Interval history:     Last seen AP 6/27/22.    Tolerating Ocrevus well without side effects.     Current symptoms:  She has migraines with visual aura, usually occurs 1-2x/month, this has not occurred since Sept 2022    She has been taking vitamin D high dose weekly    Denies weakness, sensory changes, no vision changes, denies fatigue, cognitive changes, urinary dysfunction, bowel dysfunction, spasms/cramps/pain    She has started on Addiyi as per her gynecologist, this has been working for her libido    ROS:     SOCIAL HISTORY  Social History     Tobacco Use    Smoking status: Never    Smokeless tobacco: Never   Substance Use Topics    Alcohol use: Yes     Alcohol/week: 1.0 standard drink     Types: 1 Glasses of wine per week    Drug use: No     Living arrangements - the patient lives with their family.      REVIEW OF SYMPTOMS 6/26/2022   Do you feel abnormally tired on most days? Yes   Do you feel you generally sleep well? Yes   Do you have difficulty controlling your bladder?  No   Do you have difficulty controlling your bowels?  No   Do you have frequent muscle cramps, tightness or spasms in your limbs?  No   Do you have new visual symptoms?  No   Do you have worsening difficulty with your memory or thinking? No   Do you have worsening symptoms of anxiety or depression?  No   For patients who walk, Do you have more difficulty walking?  No   Have  you fallen since your last visit?  No   For patients who use wheelchairs: Do you have any skin wounds or breakdown? Not Applicable   Do you have difficulty using your hands?  No   Do you have shooting or burning pain? No   Do you have difficulty with sexual function?  Yes   If you are sexually active, are you using birth control? Y/N  N/A Yes   Do you often choke when swallowing liquids or solid food?  No   Do you experience worsening symptoms when overheated? No   Do you need any new equipment such as a wheelchair, walker or shower chair? No   Do you receive co-pay financial assistance for your principal MS medicine? No   Would you be interested in participating in an MS research trial in the future? Yes   For patients on Gilenya, Tecfidera, Aubagio, Rituxan, Ocrevus, Tysabri, Lemtrada or Methotrexate, are you aware that you should NOT receive live virus vaccines?  Yes   Do you feel you have adequate family/friend support?  Yes   Do you have health insurance?   Yes   Are you currently employed? Yes   Do you receive SSDI/SSI?  Not Applicable   Do you use marijuana or cannabis products? No   Have you been diagnosed with a urinary tract infection since your last visit here? No   Have you been diagnosed with a respiratory tract infection since your last visit here? No   Have you been to the emergency room since your last visit here? No   Have you been hospitalized since your last visit here?  No     FSS SCORE & INTERPRETATION 6/26/2022   FSS SCORE  22   FSS SCORE INTERPRETATION May not be suffering from fatigue     MS ROSALIE-D SCORE & INTERPRETATION 6/26/2022   ROSALIE-D SCORE  3   ROSALIE-D INTERPRETATION  No indication of Depression     MS ASHLIE-7 SCORE & INTERPRETATION 6/26/2022   ASHLIE-7 SCORE  3   ASHLIE-7 SCORE INTERPRETATION Normal     PEQ MS MOS PAIN EFFECTS SCORE & INTERPRETATION 6/26/2022   PES SCORE 6   PES SCORE INTERPRETATION Scores can range from 6-30.  Items are scaled so that higher scores indicate a greater impact of  "pain on a patients mood and behavior.     PEQ MS SEXUAL SATISFACTION SCORE & INTERPRETATION 6/26/2022   SSS SCORE  20   SSS SCORE INTERPRETATION Scores can range from 4-24.  Higher scores indicate greater problems with sexual satisfaction.     MS BLADDER CONTROL SCORE & INTERPRETATION 6/26/2022   BLCS SCORE 0   BLCS SCORE INTERPRETATION  Scores can range from 0-22, with higher scores indicating greater bladder control problems.     MS BOWEL CONTROL SCORE & INTERPRETATION 6/26/2022   BWCS SCORE 4   BWCS SCORE INTERPRETATION Scores can range from 0-26, with higher scores indicating greater bowel control problems.     PEQ MS IMPACT OF VISUAL IMPAIRMENT SCORE & INTERPRETATION 6/26/2022   MARCUS SCALE SCORE  0   MARCUS SCORE INTERPRETATION Scores can range from 0-15, with higher scores indicating greater impact of visual problems on daily activites.     MS PDQ SCORE & INTERPRETATION 6/26/2022   PDQ RETROSPECTIVE MEMORY SUBSCALE 2   PDQ ATTENTION/CONCENTRATION SUBSCALE 1   PDQ PROSPECTIVE MEMORY SUBSCALE 0   PDQ PLANNING/ORGANIZATION SUBSCALE 1   PDQ TOTAL SCORE 4   PDQ SCORE INTERPRETATION Scores can range from 0-80, with higher scores indicating greater perceived cognitive impairment.     MSSS SCORE & INTERPRETATION 6/26/2022   MSSS TANGIBLE SUPPORT SUBSCALE 100   MSSS EMOTIONAL/INFORMATIONAL SUPPORT SUBSCALE 100   MSSS AFFECTIONATE SUPPORT SUBSCALE 100   MSSS POSITIVE SOCIAL INTERACTION SUBSCALE 100   MSSS TOTAL SCORE 100   MSSS SCORE INTERPRETATION Scores can range from 0-100, with higher scores indicating greater perceived support.         Exam:     Vitals:    12/09/22 1159   BP: 105/65   Pulse: 97   Weight: 79.8 kg (175 lb 13.1 oz)   Height: 5' 5" (1.651 m)          In general, the patient is well nourished and appears to be in no acute distress.    MENTAL STATUS: language is fluent, normal verbal comprehension, short-term and remote memory is intact, attention is normal, patient is alert and oriented, fund of knowlege " is appropriate by vocabulary.     CRANIAL NERVE EXAM:  There is no intrernuclear ophthalmoplegia.  Extraocular muscles are intact.No facial asymmetry. Facial sensation is intact bilaterally. There is no dysarthria. Uvula is midline, and palate moves symmetrically. Shoulder shrug intact bilaterlly. Tongue protrusion is midline. Neck is supple with full ROM    MOTOR EXAM: Normal bulk and tone throughout UE and LE bilaterally.   No pronator drift; rapid sequential movements are normal    Strength:  R deltoid 5/5, L deltoid 5/5  R biceps 5/5, L biceps 5/5  R triceps 5/5, L triceps 5/5  R finger flexors 5/5, L finger flexors 5/5  R finger extensors 5/5, L finger extensors 5/5  R finger abductors 5/5, L finger abductors 5/5  R  hip flexors 5/5, L hip flexors 5/5  R  hip extensors 5/5, L hip extensors 5/5  R knee extensors 5/5, L knee extensors 5/5  R knee flexors 5/5, L knee flexors 5/5  R ankle dorsiflexors 5/5, L ankle dorsiflexors 5/5  R ankle plantarflexors 5/5, L ankle plantarflexors 5/5    SENSORY EXAM: Normal to light touch throughout.    COORDINATION: Normal finger-to-nose exam. Normal heel-to-shin exam.    GAIT: Narrow based and stable.      Timed 25 Foot Walk: 6/27/2022 12/9/2022   Did patient wear an AFO? No No   Was assistive device used? No No   Time for 25 Foot Walk (seconds) 3.7 3.8   Time for 25 Foot Walk (seconds) 4 3.76         Imaging (personally reviewed):     No results found for this or any previous visit.    No results found for this or any previous visit.    No results found for this or any previous visit.    Results for orders placed during the hospital encounter of 10/24/22    MRI Brain Demyelinating W W/O Contrast    Impression  1. Overall, no significant change relative to prior MRI of the brain performed 02/05/2022.  2. Similar nonenhancing supratentorial and posterior fossa lesions.  No enhancement to suggest active inflammation or demyelination.  3. No typical findings of  PML.      Electronically signed by: Trip Borja  Date:    10/24/2022  Time:    08:21    Results for orders placed during the hospital encounter of 02/05/22    MRI Cervical Spine Demyelinating W W/O Contrast    Impression  No significant change from prior specifically without evidence for new cord signal abnormality or abnormal intrathecal enhancement to suggest active cervical demyelination.    Please see above for additional details.      Electronically signed by: Andrés Martinez DO  Date:    02/05/2022  Time:    13:25    Results for orders placed during the hospital encounter of 11/16/20    MRI Thoracic Spine Demyelinating W W/O Contrast    Impression  Brain appears stable from recent examination again demonstrating several scattered T2/FLAIR hyperintense lesions in the supratentorial and infratentorial white matter.  Distribution nonspecific, but remains suspicious for demyelinating disease.  Alternate less common differential considerations to include Lyme disease, prior vascular insult, or other inflammatory process.  No enhancing foci on postcontrast imaging to specifically indicate active demyelination.  Clinical correlation required.    Spinal cord appears within normal limits.  No focal lesions or enhancement to suggest recent or remote demyelination.    1.7 cm T2 hyperintense nodule in the right lobe of the thyroid gland.  Recommend thyroid ultrasound for further characterization.      Electronically signed by: Ariel Nolan MD  Date:    11/17/2020  Time:    07:44      Labs:     Lab Results   Component Value Date    BTTETROX22AU 71 02/17/2022    HYNYFCDK88LU 82 04/05/2021    NLPYXSKS68SI 42 11/17/2020     Lab Results   Component Value Date    JCVINDEX 3.79 (H) 07/09/2021    JCVANTIBODY POSITIVE (A) 07/09/2021     Lab Results   Component Value Date    OG6NXGIB 82.0 07/09/2021    ABSOLUTECD3 1782 07/09/2021    QX6UCYGW 27.2 07/09/2021    ABSOLUTECD8 590 07/09/2021    VH1KIPNV 55.4 07/09/2021    ABSOLUTECD4  1205 07/09/2021    LABCD48 2.04 07/09/2021     Lab Results   Component Value Date    WBC 5.85 08/03/2022    RBC 4.13 08/03/2022    HGB 13.4 08/03/2022    HCT 39.8 08/03/2022    MCV 96 08/03/2022    MCH 32.4 (H) 08/03/2022    MCHC 33.7 08/03/2022    RDW 13.2 08/03/2022     08/03/2022    MPV 9.8 08/03/2022    GRAN 2.8 08/03/2022    GRAN 48.3 08/03/2022    LYMPH 2.3 08/03/2022    LYMPH 39.0 08/03/2022    MONO 0.5 08/03/2022    MONO 9.1 08/03/2022    EOS 0.1 08/03/2022    BASO 0.07 08/03/2022    EOSINOPHIL 2.1 08/03/2022    BASOPHIL 1.2 08/03/2022     Sodium   Date Value Ref Range Status   02/17/2022 139 136 - 145 mmol/L Final     Potassium   Date Value Ref Range Status   02/17/2022 4.3 3.5 - 5.1 mmol/L Final     Chloride   Date Value Ref Range Status   02/17/2022 107 95 - 110 mmol/L Final     CO2   Date Value Ref Range Status   02/17/2022 26 23 - 29 mmol/L Final     Glucose   Date Value Ref Range Status   02/17/2022 90 70 - 110 mg/dL Final     BUN   Date Value Ref Range Status   02/17/2022 11 6 - 20 mg/dL Final     Creatinine   Date Value Ref Range Status   02/17/2022 0.7 0.5 - 1.4 mg/dL Final     Calcium   Date Value Ref Range Status   02/17/2022 9.2 8.7 - 10.5 mg/dL Final     Total Protein   Date Value Ref Range Status   02/17/2022 7.4 6.0 - 8.4 g/dL Final     Albumin   Date Value Ref Range Status   02/17/2022 3.5 3.5 - 5.2 g/dL Final     Total Bilirubin   Date Value Ref Range Status   02/17/2022 0.5 0.1 - 1.0 mg/dL Final     Comment:     For infants and newborns, interpretation of results should be based  on gestational age, weight and in agreement with clinical  observations.    Premature Infant recommended reference ranges:  Up to 24 hours.............<8.0 mg/dL  Up to 48 hours............<12.0 mg/dL  3-5 days..................<15.0 mg/dL  6-29 days.................<15.0 mg/dL       Alkaline Phosphatase   Date Value Ref Range Status   02/17/2022 26 (L) 55 - 135 U/L Final     AST   Date Value Ref Range  Status   02/17/2022 20 10 - 40 U/L Final     ALT   Date Value Ref Range Status   02/17/2022 17 10 - 44 U/L Final     Anion Gap   Date Value Ref Range Status   02/17/2022 6 (L) 8 - 16 mmol/L Final     eGFR if    Date Value Ref Range Status   02/17/2022 >60.0 >60 mL/min/1.73 m^2 Final     eGFR if non    Date Value Ref Range Status   02/17/2022 >60.0 >60 mL/min/1.73 m^2 Final     Comment:     Calculation used to obtain the estimated glomerular filtration  rate (eGFR) is the CKD-EPI equation.                   Diagnosis/Assessment/Plan:             NEURO MULTIPLE SCLEROSIS IMPRESSION:   MS Status:     Number of relapses in the past year?:  0    Clinical Progression:  Clinically Stable    MRI Progression:  Stable  Plan:     DMT:  No change in management    Symptom Management:  No change in symptom management     Multiple Sclerosis  -Assessment: RRMS with mild intraparenchymal lesions only stable on Ocrevus. Tolerating well without side effects. Minimal disability on exam.   -Imaging:MRI Brain WO Oct 2023  -Disease Modifying Therapies: Continue Ocrevus, safety labs Mar 2023  Symptom management  -Continue daily vit D supplement  -Discussed migraine acute treatment if she were to have any recurrences.  Lifestyle modifications for brain health discussed.  Return to clinic in 6 months.        Total time spent with the patient: 30 minutes, including face to face consultation, chart review and coordination of care, on the day of the visit. This includes face to face time and non-face to face time preparing to see the patient (eg, review of tests), obtaining and/or reviewing separately obtained history, documenting clinical information in the electronic or other health record, independently interpreting resultsand communicating results to the patient/family/caregiver, or care coordination.         Kristin Arroyo MD, MSc  Attending neurologist

## 2022-12-20 ENCOUNTER — PATIENT MESSAGE (OUTPATIENT)
Dept: RESEARCH | Facility: HOSPITAL | Age: 32
End: 2022-12-20
Payer: COMMERCIAL

## 2022-12-23 ENCOUNTER — TELEPHONE (OUTPATIENT)
Dept: PHARMACY | Facility: CLINIC | Age: 32
End: 2022-12-23
Payer: COMMERCIAL

## 2023-01-09 ENCOUNTER — PATIENT MESSAGE (OUTPATIENT)
Dept: NEUROLOGY | Facility: CLINIC | Age: 33
End: 2023-01-09
Payer: COMMERCIAL

## 2023-01-11 ENCOUNTER — PATIENT MESSAGE (OUTPATIENT)
Dept: NEUROLOGY | Facility: CLINIC | Age: 33
End: 2023-01-11
Payer: COMMERCIAL

## 2023-01-11 DIAGNOSIS — G43.809 OTHER MIGRAINE WITHOUT STATUS MIGRAINOSUS, NOT INTRACTABLE: Primary | ICD-10-CM

## 2023-01-12 ENCOUNTER — NURSE TRIAGE (OUTPATIENT)
Dept: ADMINISTRATIVE | Facility: CLINIC | Age: 33
End: 2023-01-12
Payer: COMMERCIAL

## 2023-01-12 NOTE — TELEPHONE ENCOUNTER
Mrs. Garcia is calling today to c/o new onset headache.  She has a hx of MS and headaches, but the headaches resolved with adjustments in her medications.  Yesterday she began with a similar headache, began on the right side of her head, and mild nausea, and it did not resolve with Tylenol.  She is asking what else she can take over the counter.  I advised I would have to triage her for her symptoms; because she has a new headache and a weak immune system, I advised she be seen in clinic today, no appts available virtually or in person, recommended RR home visit or [facility] virtual visit.  She was agreeble to [facility] visit, and I assisted her with setting it up.  She requested I contact the HA specialist whom she was previously scheduled with and see if she can get an appt.       Reason for Disposition   New headache and weak immune system (e.g., HIV positive, cancer chemo, splenectomy, organ transplant, chronic steroids)    Additional Information   Negative: Difficult to awaken or acting confused (e.g., disoriented, slurred speech)   Negative: Weakness of the face, arm or leg on one side of the body and new-onset   Negative: Numbness of the face, arm or leg on one side of the body and new-onset   Negative: Loss of speech or garbled speech and new-onset   Negative: Passed out (i.e., fainted, collapsed and was not responding)   Negative: Sounds like a life-threatening emergency to the triager   Negative: Followed a head injury within last 3 days   Negative: Traumatic Brain Injury (TBI) is suspected   Negative: Sinus pain of forehead and yellow or green nasal discharge   Negative: Pregnant   Negative: Unable to walk without falling   Negative: Stiff neck (can't touch chin to chest)   Negative: Possibility of carbon monoxide exposure   Negative: SEVERE headache, states 'worst headache' of life   Negative: SEVERE headache, sudden-onset (i.e., reaching maximum intensity within seconds to 1 hour)   Negative: Severe pain in one  eye   Negative: Loss of vision or double vision  (Exception: Same as prior migraines.)   Negative: Patient sounds very sick or weak to the triager   Negative: Fever > 103 F (39.4 C)   Negative: Fever > 100.0 F (37.8 C) and has diabetes mellitus or a weak immune system (e.g., HIV positive, cancer chemotherapy, organ transplant, splenectomy, chronic steroids)   Negative: SEVERE headache (e.g., excruciating) and has had severe headaches before   Negative: SEVERE headache and not relieved by pain meds   Negative: SEVERE headache and vomiting   Negative: SEVERE headache and fever    Protocols used: Headache-A-[place]

## 2023-01-13 ENCOUNTER — OFFICE VISIT (OUTPATIENT)
Dept: NEUROLOGY | Facility: CLINIC | Age: 33
End: 2023-01-13
Payer: COMMERCIAL

## 2023-01-13 VITALS
HEART RATE: 89 BPM | BODY MASS INDEX: 29.31 KG/M2 | HEIGHT: 65 IN | SYSTOLIC BLOOD PRESSURE: 114 MMHG | DIASTOLIC BLOOD PRESSURE: 57 MMHG | WEIGHT: 175.94 LBS

## 2023-01-13 DIAGNOSIS — G43.009 MIGRAINE WITHOUT AURA AND WITHOUT STATUS MIGRAINOSUS, NOT INTRACTABLE: Primary | ICD-10-CM

## 2023-01-13 DIAGNOSIS — G44.40 MEDICATION OVERUSE HEADACHE: ICD-10-CM

## 2023-01-13 PROCEDURE — 1159F MED LIST DOCD IN RCRD: CPT | Mod: CPTII,S$GLB,, | Performed by: STUDENT IN AN ORGANIZED HEALTH CARE EDUCATION/TRAINING PROGRAM

## 2023-01-13 PROCEDURE — 3074F PR MOST RECENT SYSTOLIC BLOOD PRESSURE < 130 MM HG: ICD-10-PCS | Mod: CPTII,S$GLB,, | Performed by: STUDENT IN AN ORGANIZED HEALTH CARE EDUCATION/TRAINING PROGRAM

## 2023-01-13 PROCEDURE — 1159F PR MEDICATION LIST DOCUMENTED IN MEDICAL RECORD: ICD-10-PCS | Mod: CPTII,S$GLB,, | Performed by: STUDENT IN AN ORGANIZED HEALTH CARE EDUCATION/TRAINING PROGRAM

## 2023-01-13 PROCEDURE — 3008F BODY MASS INDEX DOCD: CPT | Mod: CPTII,S$GLB,, | Performed by: STUDENT IN AN ORGANIZED HEALTH CARE EDUCATION/TRAINING PROGRAM

## 2023-01-13 PROCEDURE — 99215 PR OFFICE/OUTPT VISIT, EST, LEVL V, 40-54 MIN: ICD-10-PCS | Mod: S$GLB,,, | Performed by: STUDENT IN AN ORGANIZED HEALTH CARE EDUCATION/TRAINING PROGRAM

## 2023-01-13 PROCEDURE — 99215 OFFICE O/P EST HI 40 MIN: CPT | Mod: S$GLB,,, | Performed by: STUDENT IN AN ORGANIZED HEALTH CARE EDUCATION/TRAINING PROGRAM

## 2023-01-13 PROCEDURE — 3074F SYST BP LT 130 MM HG: CPT | Mod: CPTII,S$GLB,, | Performed by: STUDENT IN AN ORGANIZED HEALTH CARE EDUCATION/TRAINING PROGRAM

## 2023-01-13 PROCEDURE — 99999 PR PBB SHADOW E&M-EST. PATIENT-LVL III: CPT | Mod: PBBFAC,,, | Performed by: STUDENT IN AN ORGANIZED HEALTH CARE EDUCATION/TRAINING PROGRAM

## 2023-01-13 PROCEDURE — 3078F PR MOST RECENT DIASTOLIC BLOOD PRESSURE < 80 MM HG: ICD-10-PCS | Mod: CPTII,S$GLB,, | Performed by: STUDENT IN AN ORGANIZED HEALTH CARE EDUCATION/TRAINING PROGRAM

## 2023-01-13 PROCEDURE — 3078F DIAST BP <80 MM HG: CPT | Mod: CPTII,S$GLB,, | Performed by: STUDENT IN AN ORGANIZED HEALTH CARE EDUCATION/TRAINING PROGRAM

## 2023-01-13 PROCEDURE — 99999 PR PBB SHADOW E&M-EST. PATIENT-LVL III: ICD-10-PCS | Mod: PBBFAC,,, | Performed by: STUDENT IN AN ORGANIZED HEALTH CARE EDUCATION/TRAINING PROGRAM

## 2023-01-13 PROCEDURE — 3008F PR BODY MASS INDEX (BMI) DOCUMENTED: ICD-10-PCS | Mod: CPTII,S$GLB,, | Performed by: STUDENT IN AN ORGANIZED HEALTH CARE EDUCATION/TRAINING PROGRAM

## 2023-01-13 RX ORDER — SUMATRIPTAN 50 MG/1
TABLET, FILM COATED ORAL
Qty: 9 TABLET | Refills: 0 | Status: SHIPPED | OUTPATIENT
Start: 2023-01-13 | End: 2023-06-11 | Stop reason: SDUPTHER

## 2023-01-13 RX ORDER — LANOLIN ALCOHOL/MO/W.PET/CERES
400 CREAM (GRAM) TOPICAL DAILY
Qty: 30 TABLET | Refills: 5 | Status: SHIPPED | OUTPATIENT
Start: 2023-01-13 | End: 2023-07-12

## 2023-01-13 NOTE — PROGRESS NOTES
Chief Complaint and Duration     Chief Complaint   Patient presents with    Migraine   For months, improved but recurred    History of Present Illness     Jessica Garcia is a 32 y.o. female with a history of multiple medical diagnoses including multiple sclerosis who is here for evaluation of headache that is relatively new to her the past several months.     Seens Dr Arroyo for multiple sclerosis on ocrevus. On hormone replacement as well, established w a gynecologist. States w change in hormone medication, she developed a R temple constant headache that was pulsatile in nature - was taking frequent daily tylenol and iburpofens. That improved, but this past Wednesday, she felt nauseous and the temple pain recurred. She took tylenol 1000mg TID.   Improved today but was concerned given her hx of multiple sclerosis. Otherwise no headache today, no dizziness, no new vision changes, no other new focal weakness or sensory complaints.     Imitrex was sent in for her , she will be picking it up today.     Review of Systems: (positive bolded)  Constitutional: Negative for fatigue, activity change, fevers, or chills.   HENT:  Negative for new visual disturbances or difficulty swallowing.    Respiratory:  Negative for shortness of breath.    Cardiovascular:  Negative for palpitations.   Gastrointestinal:  Negative for constipation, nausea, or vomiting.   Endocrine: Negative for temperature instability/intolerance.   Genitourinary:  Negative for difficulty urinating.   Musculoskeletal:  Negative for neck pain, back pain, myalgias, joint swelling, or gait problem.  Skin:  Negative for rash or lesions.   Neurological:  Negative for seizures, headaches, dizziness, tremors, syncope, speech difficulty, weakness, light-headedness, or numbness.   Hematological:  Does not bruise/bleed easily.   Psychiatric/Behavioral: Negative for decreased concentration or sleep disturbance.    Review of patient's allergies indicates:   Allergen  Reactions    Strawberries [strawberry] Swelling    Iodine and iodide containing products      Causes dizziness and headaches    Lavender Rash     Current Outpatient Medications   Medication Sig Dispense Refill    flibanserin 100 mg Tab Take 1 tablet (100 mg) by mouth once daily. 30 tablet 12    gabapentin (NEURONTIN) 600 MG tablet Take 600 mg by mouth every evening.      hydrocortisone 2.5 % cream Apply topically 2 (two) times daily. 28 g 1    ID NOW COVID-19 TEST KIT Kit TEST AS DIRECTED TODAY      LO LOESTRIN FE 1 mg-10 mcg (24)/10 mcg (2) Tab Take 1 tablet by mouth once daily. 84 tablet 1    magnesium oxide (MAG-OX) 400 mg (241.3 mg magnesium) tablet Take 1 tablet (400 mg total) by mouth once daily. 30 tablet 5    meloxicam (MOBIC) 15 MG tablet Take 15 mg by mouth once daily.      ocrelizumab (OCREVUS IV) Inject into the vein.      polyethylene glycol (GLYCOLAX) 17 gram/dose powder Take 17 g by mouth once daily. 235 g 0    sumatriptan (IMITREX) 50 MG tablet Take 1 tablet at onset of headache; may take 2nd tablet 2 hours later if needed, but not to exceed two tabs per day. 9 tablet 0    triamcinolone acetonide 0.1% (KENALOG) 0.1 % cream Apply to affected areas of body BID prn rash. Do not use on face, underarms, or groin. 80 g 3     No current facility-administered medications for this visit.       Medical History     Past Medical History:   Diagnosis Date    Herniation of lumbar intervertebral disc     Hypercholesteremia     Multiple sclerosis 01/05/2021    Tachycardia      No past surgical history on file.  Family History   Problem Relation Age of Onset    Thyroid disease Mother     Hypertension Sister     Heart disease Maternal Grandfather     Hypertension Paternal Grandmother     Hyperlipidemia Sister     Melanoma Neg Hx     Breast cancer Neg Hx     Colon cancer Neg Hx     Ovarian cancer Neg Hx      Social History     Socioeconomic History    Marital status: Single   Occupational History    Occupation:  counselor   Tobacco Use    Smoking status: Never    Smokeless tobacco: Never   Substance and Sexual Activity    Alcohol use: Yes     Alcohol/week: 1.0 standard drink     Types: 1 Glasses of wine per week    Drug use: No    Sexual activity: Yes     Partners: Male     Birth control/protection: OCP   Other Topics Concern    Are you pregnant or think you may be? No    Breast-feeding No   Social History Narrative    The patient does not exercise regularly ().      Rates diet as fair.      She is not satisfied with weight.    She does not drink at least 1/2 gallon water daily.    She drinks 1 coffee/tea/caffeine-containing soft drinks daily.    Total sleep time at night is 7-8 hours.    She works 40 hours per week.    She does wear seat belts.    Hobbies include reading, movies.           Exam     Vitals:    01/13/23 0800   BP: (!) 114/57   Pulse: 89      Physical Exam:  General: She is not in acute distress. She is not ill-appearing.   HENT: Normocephalic and atraumatic. Moist mucous membranes.  Eyes: Conjunctivae normal.   Pulmonary: Pulmonary effort is normal.   Abdominal: Abdomen is soft and flat.   Skin: Skin is warm and dry. No rashes.   Psychiatric: Mood normal.        Neurologic Exam   Mental status: oriented to person, place, and time  Attention: Normal. Concentration: normal.  Speech: speech is normal.  Cranial Nerves: PERRL, EOMI intact, V1-V3 Facial sensation intact. Symmetric facies. Hearing grossly intact. Palate and uvula midline, symmetric. No tongue deviation. Trapezius strength intact.     Motor exam: bulk and tone normal. Strength 5/5 in bilateral upper extremities: deltoids, biceps, triceps, wrist flexion/extension, finger abduction/adduction. Strength 5/5 in bilateral lower extremities: hip flexion/extension, thigh adduction/abduction, knee flexion/extension, dorsiflexion/plantarflexion, foot eversion/inversion.    Reflexes: 2+ in bilateral upper extremities: biceps and brachiaradialis, 2+ in  bilateral lower extremities: patellar and achilles  Plantar reflex: normal    Sensory exam: light touch intact    Gait exam: normal  Romberg: negative  Coordination: normal    Tremor: none    Labs and Imaging     Imaging: reviewed  MRI brain 10/2022   Impression:  1. Overall, no significant change relative to prior MRI of the brain performed 02/05/2022.  2. Similar nonenhancing supratentorial and posterior fossa lesions.  No enhancement to suggest active inflammation or demyelination.  3. No typical findings of PML.  Electronically signed by: Trip Borja    Assessment and Plan     Problem List Items Addressed This Visit          Neuro    Migraine without aura and without status migrainosus, not intractable - Primary    Relevant Medications    magnesium oxide (MAG-OX) 400 mg (241.3 mg magnesium) tablet    Medication overuse headache     32 year old female w hx of multiple sclerosis on ocrevus, here w headaches associated around hormonal medication changes, improved but recurred recently but improved. MRI brain last done on 10/19/22 w no acute changes.   Take magnesium 400mg daily for headache prevention  Sumatryptan for PRN abortive relief  Discussed lifestyle modifications    Follow-up: Follow up in about 3 months (around 4/13/2023), or if symptoms worsen or fail to improve.    Time spent on this encounter: 45 minutes. This includes face to face time and non-face to face time preparing to see the patient (eg, review of tests), obtaining and/or reviewing separately obtained history, documenting clinical information in the electronic or other health record, independently interpreting results and communicating results to the patient/family/caregiver, or care coordinator.

## 2023-02-20 ENCOUNTER — PATIENT MESSAGE (OUTPATIENT)
Dept: PSYCHIATRY | Facility: CLINIC | Age: 33
End: 2023-02-20
Payer: COMMERCIAL

## 2023-02-27 ENCOUNTER — TELEPHONE (OUTPATIENT)
Dept: NEUROLOGY | Facility: CLINIC | Age: 33
End: 2023-02-27

## 2023-02-27 DIAGNOSIS — G35 MULTIPLE SCLEROSIS: Primary | ICD-10-CM

## 2023-03-04 ENCOUNTER — PATIENT MESSAGE (OUTPATIENT)
Dept: NEUROLOGY | Facility: CLINIC | Age: 33
End: 2023-03-04
Payer: COMMERCIAL

## 2023-03-04 DIAGNOSIS — G35 MULTIPLE SCLEROSIS: Primary | ICD-10-CM

## 2023-04-26 ENCOUNTER — PATIENT MESSAGE (OUTPATIENT)
Dept: NEUROLOGY | Facility: CLINIC | Age: 33
End: 2023-04-26
Payer: COMMERCIAL

## 2023-04-26 DIAGNOSIS — G35 MULTIPLE SCLEROSIS: Primary | ICD-10-CM

## 2023-05-09 ENCOUNTER — PATIENT MESSAGE (OUTPATIENT)
Dept: NEUROLOGY | Facility: CLINIC | Age: 33
End: 2023-05-09
Payer: COMMERCIAL

## 2023-05-23 NOTE — ED TRIAGE NOTES
"Jessica Garcia, an 28 y.o. female presents to the ED complaining of abdominal pain.  Patient left [facility] in [place] an hour ago after having an inconclusive ultrasound and told she was constipated.  Patient did not fill the prescription for Reglan she was given and is convinced that what she has is "serious."        Review of patient's allergies indicates:   Allergen Reactions    Strawberries [strawberry] Swelling     Chief Complaint   Patient presents with    Abdominal Pain     c/o diffuse upper abd pain. Pt recently seen at [facility] and told she was constipated, "but I don't think so." Denies CP, n/v     Past Medical History:   Diagnosis Date    Herniation of lumbar intervertebral disc     Hypercholesteremia     Tachycardia        "
No difficulties

## 2023-07-05 ENCOUNTER — OFFICE VISIT (OUTPATIENT)
Dept: OTOLARYNGOLOGY | Facility: CLINIC | Age: 33
End: 2023-07-05
Payer: COMMERCIAL

## 2023-07-05 ENCOUNTER — PATIENT MESSAGE (OUTPATIENT)
Dept: FAMILY MEDICINE | Facility: CLINIC | Age: 33
End: 2023-07-05
Payer: COMMERCIAL

## 2023-07-05 VITALS
HEART RATE: 109 BPM | SYSTOLIC BLOOD PRESSURE: 111 MMHG | DIASTOLIC BLOOD PRESSURE: 74 MMHG | BODY MASS INDEX: 27.26 KG/M2 | WEIGHT: 163.81 LBS

## 2023-07-05 DIAGNOSIS — D84.821 IMMUNOCOMPROMISED STATE DUE TO DRUG THERAPY: ICD-10-CM

## 2023-07-05 DIAGNOSIS — G35 MULTIPLE SCLEROSIS: ICD-10-CM

## 2023-07-05 DIAGNOSIS — J06.9 VIRAL URI WITH COUGH: Primary | ICD-10-CM

## 2023-07-05 DIAGNOSIS — J00 ACUTE RHINITIS: ICD-10-CM

## 2023-07-05 DIAGNOSIS — Z79.899 IMMUNOCOMPROMISED STATE DUE TO DRUG THERAPY: ICD-10-CM

## 2023-07-05 PROCEDURE — 99999 PR PBB SHADOW E&M-EST. PATIENT-LVL IV: CPT | Mod: PBBFAC,,, | Performed by: PHYSICIAN ASSISTANT

## 2023-07-05 PROCEDURE — 99999 PR PBB SHADOW E&M-EST. PATIENT-LVL IV: ICD-10-PCS | Mod: PBBFAC,,, | Performed by: PHYSICIAN ASSISTANT

## 2023-07-05 PROCEDURE — 99214 PR OFFICE/OUTPT VISIT, EST, LEVL IV, 30-39 MIN: ICD-10-PCS | Mod: S$GLB,,, | Performed by: PHYSICIAN ASSISTANT

## 2023-07-05 PROCEDURE — 1159F PR MEDICATION LIST DOCUMENTED IN MEDICAL RECORD: ICD-10-PCS | Mod: CPTII,S$GLB,, | Performed by: PHYSICIAN ASSISTANT

## 2023-07-05 PROCEDURE — 3074F PR MOST RECENT SYSTOLIC BLOOD PRESSURE < 130 MM HG: ICD-10-PCS | Mod: CPTII,S$GLB,, | Performed by: PHYSICIAN ASSISTANT

## 2023-07-05 PROCEDURE — 3078F PR MOST RECENT DIASTOLIC BLOOD PRESSURE < 80 MM HG: ICD-10-PCS | Mod: CPTII,S$GLB,, | Performed by: PHYSICIAN ASSISTANT

## 2023-07-05 PROCEDURE — 99214 OFFICE O/P EST MOD 30 MIN: CPT | Mod: S$GLB,,, | Performed by: PHYSICIAN ASSISTANT

## 2023-07-05 PROCEDURE — 3008F BODY MASS INDEX DOCD: CPT | Mod: CPTII,S$GLB,, | Performed by: PHYSICIAN ASSISTANT

## 2023-07-05 PROCEDURE — 3008F PR BODY MASS INDEX (BMI) DOCUMENTED: ICD-10-PCS | Mod: CPTII,S$GLB,, | Performed by: PHYSICIAN ASSISTANT

## 2023-07-05 PROCEDURE — 1159F MED LIST DOCD IN RCRD: CPT | Mod: CPTII,S$GLB,, | Performed by: PHYSICIAN ASSISTANT

## 2023-07-05 PROCEDURE — 3078F DIAST BP <80 MM HG: CPT | Mod: CPTII,S$GLB,, | Performed by: PHYSICIAN ASSISTANT

## 2023-07-05 PROCEDURE — 3074F SYST BP LT 130 MM HG: CPT | Mod: CPTII,S$GLB,, | Performed by: PHYSICIAN ASSISTANT

## 2023-07-05 RX ORDER — BENZONATATE 100 MG/1
100 CAPSULE ORAL 3 TIMES DAILY PRN
Qty: 30 CAPSULE | Refills: 0 | Status: SHIPPED | OUTPATIENT
Start: 2023-07-05 | End: 2023-07-15

## 2023-07-05 NOTE — PROGRESS NOTES
Subjective:     HPI: Jessica Garcia is a 32 y.o. female with h/o migraines, MS (on ocrevus) who was self-referred for sinusitis.    Patient reports developing a nonproductive cough x5 days ago.  Patient reports developing a globus sensation and throat clearing with bilateral rib pain over the last 5 days.  Patient denies any rhinorrhea, sneezing, watery/itchy eyes, facial pressure, nasal congestion, ear pressure, changes in hearing, fevers, chills, sweats, myalgias.  Patient states she can sometimes cough up phlegm and neck cough is usually worse in the morning.  Patient reports that she coughed up bile this morning after vigorous coughing..  Of note, patient positive for flu 11/21/22 and currently on immunosuppressing drug for MS.     Current sinonasal medications include none at present.  The last course of antibiotics was a long time ago.    She does not recall previously having [facility] allergy testing.  She denies a history of asthma.  She denies a history of reflux symptoms.      She does not recall a prior history of nasal trauma.  She has not had sinonasal surgery.    She has not had a tonsillectomy.  She is not a tobacco smoker.     Past Medical/Past Surgical History  Past Medical History:   Diagnosis Date    Herniation of lumbar intervertebral disc     Hypercholesteremia     Multiple sclerosis 01/05/2021    Tachycardia      She has no past surgical history on file.    Family History/Social History  Her family history includes Heart disease in her maternal grandfather; Hyperlipidemia in her sister; Hypertension in her paternal grandmother and sister; Thyroid disease in her mother.  She reports that she has never smoked. She has never used smokeless tobacco. She reports current alcohol use of about 1.0 standard drink per week. She reports that she does not use drugs.    Allergies/Immunizations  She is allergic to strawberries [strawberry], iodine and iodide containing products, and lavender.  Immunization  History   Administered Date(s) Administered    COVID-19, MRNA, LN-S, PF (MODERNA FULL 0.5 ML DOSE) 01/22/2021, 03/01/2021, 10/22/2021    DTP 1989, 01/16/1990, 03/26/1990, 04/02/1991, 08/18/1994    HIB 02/04/1991    Hepatitis B, Pediatric/Adolescent 03/10/2006, 06/02/2006, 02/23/2007    Influenza 01/28/2019, 11/11/2020    Influenza Split 10/04/2010    MMR 01/04/1991, 08/08/1994, 08/18/1994    Meningococcal Conjugate (MCV4P) 03/10/2006    OPV 1989, 01/16/1990, 04/29/1991, 08/18/1994    Tdap 06/02/2006        Medications   flibanserin Tab  gabapentin  hydrocortisone  ID NOW COVID-19 TEST KIT Kit  LO LOESTRIN FE Tab  magnesium oxide  meloxicam  OCREVUS IV  polyethylene glycol  sumatriptan  triamcinolone acetonide 0.1%     Review of Systems     Constitutional: Positive for unexpected weight loss.      HENT: Positive for postnasal drip and sore throat.      Eyes:  Negative for change in eyesight, eye drainage, eye itching and photophobia.     Respiratory:  Positive for cough.      Cardiovascular:  Negative for chest pain, foot swelling, irregular heartbeat and swollen veins.     Gastrointestinal:  Negative for abdominal pain, acid reflux, constipation, diarrhea, heartburn and vomiting.     Genitourinary: Negative for difficulty urinating, sexual problems and frequent urination.     Musc: Positive for neck pain.     Skin: Negative for rash.     Allergy: Negative for food allergies and seasonal allergies.     Endocrine: Negative for cold intolerance and heat intolerance.      Neurological: Positive for headaches.     Hematologic: Negative for bruises/bleeds easily and swollen glands.      Psychiatric: Negative for decreased concentration, depression, nervous/anxious and sleep disturbance.          Objective:     /74 (BP Location: Right arm, Patient Position: Sitting)   Pulse 109   Wt 74.3 kg (163 lb 12.8 oz)   BMI 27.26 kg/m²        Constitutional:   She appears well-developed and well-nourished. Normal  speech.      Head:  Normocephalic and atraumatic. Facial strength is normal.      Ears:    Right Ear: No drainage or swelling. Tympanic membrane is not perforated and not erythematous. No middle ear effusion.   Left Ear: No drainage or swelling. Tympanic membrane is not perforated and not erythematous.  No middle ear effusion.     Nose:  Rhinorrhea (scant in R nasal cavity) present. No mucosal edema, septal deviation or polyps.  No foreign bodies. Turbinates normal, no turbinate masses and no turbinate hypertrophy.  Right sinus exhibits no maxillary sinus tenderness and no frontal sinus tenderness. Left sinus exhibits no maxillary sinus tenderness and no frontal sinus tenderness.     Mouth/Throat  Oropharynx clear and moist without lesions or asymmetry, normal uvula midline and lips, teeth, and gums normal. No trismus or mucous membrane lesions. No oropharyngeal exudate, posterior oropharyngeal edema or posterior oropharyngeal erythema. Tonsils present (with crypts), +1.      Neck:  Neck normal without thyromegaly masses, asymmetry, normal tracheal structure, crepitus, and tenderness and phonation normal.     She has no cervical adenopathy.     Pulmonary/Chest:   Effort normal.     Psychiatric:   She has a normal mood and affect. Her speech is normal and behavior is normal.     Procedure    None    Data Reviewed  I personally reviewed the chart, including any outside records, and pertinent data below:    WBC (K/uL)   Date Value   02/27/2023 6.17     Eosinophil % (%)   Date Value   02/27/2023 0.2     Eos # (K/uL)   Date Value   02/27/2023 0.0     Platelets (K/uL)   Date Value   02/27/2023 333     Glucose (mg/dL)   Date Value   02/27/2023 140 (H)     No results found for: IGE    I independently reviewed the images of the MRI brain dated 10/24/22. Pertinent findings include overall patent sinuses.    Assessment & Plan:     1. Viral URI with cough  -     unclear etiology but suspect viral   - advised start nasal saline  rinses  - benzonatate (TESSALON) 100 MG capsule; Take 1 capsule (100 mg total) by mouth 3 (three) times daily as needed for Cough.  Dispense: 30 capsule; Refill: 0  - patient would like to defer laryngoscopy today  - advised follow-up with PCP if not better in the next 10 days.  - Limited viral testing at this facility; if interested can discuss with PCP or visit urgent care    2. Multiple sclerosis  3. Immunocompromised state due to drug therapy   - immunocompromised   - no evidence of active bacterial infection    She will Follow up in about 3 weeks (around 7/26/2023) for possible laryngoscopy.  I had a discussion with the patient regarding her condition and the further workup and management options.    All questions were answered, and the patient is in agreement with the above.     Disclaimer:  This note may have been prepared utilizing voice recognition software which may result in occasional typographical errors in the text such as sound alike words.   If further clarification is needed, please contact the ENT department of [facility].

## 2023-07-07 ENCOUNTER — OFFICE VISIT (OUTPATIENT)
Dept: FAMILY MEDICINE | Facility: CLINIC | Age: 33
End: 2023-07-07
Attending: FAMILY MEDICINE
Payer: COMMERCIAL

## 2023-07-07 VITALS — BODY MASS INDEX: 27.49 KG/M2 | HEIGHT: 65 IN | WEIGHT: 165 LBS

## 2023-07-07 DIAGNOSIS — B34.9 VIRAL SYNDROME: Primary | ICD-10-CM

## 2023-07-07 PROCEDURE — 99213 PR OFFICE/OUTPT VISIT, EST, LEVL III, 20-29 MIN: ICD-10-PCS | Mod: 95,,, | Performed by: FAMILY MEDICINE

## 2023-07-07 PROCEDURE — 1159F PR MEDICATION LIST DOCUMENTED IN MEDICAL RECORD: ICD-10-PCS | Mod: CPTII,95,, | Performed by: FAMILY MEDICINE

## 2023-07-07 PROCEDURE — 1160F PR REVIEW ALL MEDS BY PRESCRIBER/CLIN PHARMACIST DOCUMENTED: ICD-10-PCS | Mod: CPTII,95,, | Performed by: FAMILY MEDICINE

## 2023-07-07 PROCEDURE — 99213 OFFICE O/P EST LOW 20 MIN: CPT | Mod: 95,,, | Performed by: FAMILY MEDICINE

## 2023-07-07 PROCEDURE — 3008F PR BODY MASS INDEX (BMI) DOCUMENTED: ICD-10-PCS | Mod: CPTII,95,, | Performed by: FAMILY MEDICINE

## 2023-07-07 PROCEDURE — 1160F RVW MEDS BY RX/DR IN RCRD: CPT | Mod: CPTII,95,, | Performed by: FAMILY MEDICINE

## 2023-07-07 PROCEDURE — 1159F MED LIST DOCD IN RCRD: CPT | Mod: CPTII,95,, | Performed by: FAMILY MEDICINE

## 2023-07-07 PROCEDURE — 3008F BODY MASS INDEX DOCD: CPT | Mod: CPTII,95,, | Performed by: FAMILY MEDICINE

## 2023-07-07 RX ORDER — LEVOCETIRIZINE DIHYDROCHLORIDE 5 MG/1
5 TABLET, FILM COATED ORAL NIGHTLY
Qty: 30 TABLET | Refills: 3 | Status: SHIPPED | OUTPATIENT
Start: 2023-07-07 | End: 2023-10-17

## 2023-07-07 RX ORDER — METHYLPREDNISOLONE 4 MG/1
TABLET ORAL
Qty: 1 EACH | Refills: 0 | Status: SHIPPED | OUTPATIENT
Start: 2023-07-07 | End: 2023-07-28

## 2023-07-07 NOTE — PROGRESS NOTES
The patient location is: home  The chief complaint leading to consultation is: cough    Visit type: audiovisual    Face to Face time with patient: 15  20 minutes of total time spent on the encounter, which includes face to face time and non-face to face time preparing to see the patient (eg, review of tests), Obtaining and/or reviewing separately obtained history, Documenting clinical information in the electronic or other health record, Independently interpreting results (not separately reported) and communicating results to the patient/family/caregiver, or Care coordination (not separately reported).         Each patient to whom he or she provides medical services by telemedicine is:  (1) informed of the relationship between the physician and patient and the respective role of any other health care provider with respect to management of the patient; and (2) notified that he or she may decline to receive medical services by telemedicine and may withdraw from such care at any time.    Notes:   Subjective:       Patient ID: Jessica Garcia is a 32 y.o. female.    Chief Complaint: URI    URI   Associated symptoms include congestion, coughing and a sore throat. Pertinent negatives include no abdominal pain, chest pain, ear pain, headaches, rash, rhinorrhea, sinus pain or wheezing.   Cough  This is a new problem. The current episode started in the past 7 days. The problem has been waxing and waning. The problem occurs every few minutes. The cough is Non-productive. Associated symptoms include postnasal drip and a sore throat. Pertinent negatives include no chest pain, chills, ear congestion, ear pain, fever, headaches, heartburn, hemoptysis, myalgias, nasal congestion, rash, rhinorrhea, shortness of breath, sweats, weight loss or wheezing. The symptoms are aggravated by lying down. Risk factors for lung disease include animal exposure and occupational exposure. She has tried OTC cough suppressant and prescription  cough suppressant for the symptoms. The treatment provided mild relief. There is no history of asthma, bronchiectasis, bronchitis, COPD, emphysema, environmental allergies or pneumonia.   Pt is in virtual visit for c/o nasal congestion pnd and cough worse at night pt saw ent given tessalon pearls with cont symptoms no n/v/f/c/d/c no change in bowel habits no sob/cp   Review of Systems   Constitutional:  Negative for chills, fever and weight loss.   HENT:  Positive for congestion, postnasal drip and sore throat. Negative for ear pain, rhinorrhea, sinus pressure and sinus pain.    Respiratory:  Positive for cough. Negative for hemoptysis, chest tightness, shortness of breath and wheezing.    Cardiovascular:  Negative for chest pain.   Gastrointestinal:  Negative for abdominal distention, abdominal pain, blood in stool and heartburn.   Musculoskeletal:  Negative for myalgias.   Skin:  Negative for rash.   Allergic/Immunologic: Negative for environmental allergies.   Neurological:  Negative for headaches.     Objective:        Physical Exam  Constitutional:       Appearance: Normal appearance. She is not ill-appearing.   HENT:      Head: Normocephalic and atraumatic.      Nose: Congestion and rhinorrhea present.   Eyes:      Extraocular Movements: Extraocular movements intact.   Pulmonary:      Effort: Pulmonary effort is normal. No respiratory distress.      Comments: coughing  Neurological:      General: No focal deficit present.      Mental Status: She is alert and oriented to person, place, and time.      Cranial Nerves: No cranial nerve deficit.      Coordination: Coordination normal.   Psychiatric:         Mood and Affect: Mood normal.         Behavior: Behavior normal.         Thought Content: Thought content normal.         Judgment: Judgment normal.       Assessment:       1. Viral syndrome        Plan:    Orders declined  Cont meds  Low fat diet  Rest  Increase fluids  Medrol dose pack  Xyzal  Rtc for annual  "exam       "This note will not be shared with the patient."     "

## 2023-07-10 ENCOUNTER — OFFICE VISIT (OUTPATIENT)
Dept: PRIMARY CARE CLINIC | Facility: CLINIC | Age: 33
End: 2023-07-10
Payer: COMMERCIAL

## 2023-07-10 ENCOUNTER — HOSPITAL ENCOUNTER (OUTPATIENT)
Dept: RADIOLOGY | Facility: HOSPITAL | Age: 33
Discharge: HOME | End: 2023-07-10
Attending: INTERNAL MEDICINE
Payer: COMMERCIAL

## 2023-07-10 VITALS
TEMPERATURE: 98 F | HEART RATE: 110 BPM | OXYGEN SATURATION: 99 % | SYSTOLIC BLOOD PRESSURE: 110 MMHG | WEIGHT: 162.94 LBS | HEIGHT: 65 IN | BODY MASS INDEX: 27.15 KG/M2 | RESPIRATION RATE: 18 BRPM | DIASTOLIC BLOOD PRESSURE: 65 MMHG

## 2023-07-10 DIAGNOSIS — R10.9 ABDOMINAL PAIN, UNSPECIFIED ABDOMINAL LOCATION: Primary | ICD-10-CM

## 2023-07-10 DIAGNOSIS — R10.9 ABDOMINAL PAIN, UNSPECIFIED ABDOMINAL LOCATION: ICD-10-CM

## 2023-07-10 PROCEDURE — 1160F RVW MEDS BY RX/DR IN RCRD: CPT | Mod: CPTII,S$GLB,, | Performed by: INTERNAL MEDICINE

## 2023-07-10 PROCEDURE — 74019 RADEX ABDOMEN 2 VIEWS: CPT | Mod: 26,,, | Performed by: RADIOLOGY

## 2023-07-10 PROCEDURE — 3074F SYST BP LT 130 MM HG: CPT | Mod: CPTII,S$GLB,, | Performed by: INTERNAL MEDICINE

## 2023-07-10 PROCEDURE — 3078F DIAST BP <80 MM HG: CPT | Mod: CPTII,S$GLB,, | Performed by: INTERNAL MEDICINE

## 2023-07-10 PROCEDURE — 1159F MED LIST DOCD IN RCRD: CPT | Mod: CPTII,S$GLB,, | Performed by: INTERNAL MEDICINE

## 2023-07-10 PROCEDURE — 99213 OFFICE O/P EST LOW 20 MIN: CPT | Mod: S$GLB,,, | Performed by: INTERNAL MEDICINE

## 2023-07-10 PROCEDURE — 1160F PR REVIEW ALL MEDS BY PRESCRIBER/CLIN PHARMACIST DOCUMENTED: ICD-10-PCS | Mod: CPTII,S$GLB,, | Performed by: INTERNAL MEDICINE

## 2023-07-10 PROCEDURE — 3008F PR BODY MASS INDEX (BMI) DOCUMENTED: ICD-10-PCS | Mod: CPTII,S$GLB,, | Performed by: INTERNAL MEDICINE

## 2023-07-10 PROCEDURE — 99999 PR PBB SHADOW E&M-EST. PATIENT-LVL V: ICD-10-PCS | Mod: PBBFAC,,, | Performed by: INTERNAL MEDICINE

## 2023-07-10 PROCEDURE — 74019 RADEX ABDOMEN 2 VIEWS: CPT | Mod: TC,PN

## 2023-07-10 PROCEDURE — 99213 PR OFFICE/OUTPT VISIT, EST, LEVL III, 20-29 MIN: ICD-10-PCS | Mod: S$GLB,,, | Performed by: INTERNAL MEDICINE

## 2023-07-10 PROCEDURE — 99999 PR PBB SHADOW E&M-EST. PATIENT-LVL V: CPT | Mod: PBBFAC,,, | Performed by: INTERNAL MEDICINE

## 2023-07-10 PROCEDURE — 3008F BODY MASS INDEX DOCD: CPT | Mod: CPTII,S$GLB,, | Performed by: INTERNAL MEDICINE

## 2023-07-10 PROCEDURE — 1159F PR MEDICATION LIST DOCUMENTED IN MEDICAL RECORD: ICD-10-PCS | Mod: CPTII,S$GLB,, | Performed by: INTERNAL MEDICINE

## 2023-07-10 PROCEDURE — 74019 XR ABDOMEN FLAT AND ERECT: ICD-10-PCS | Mod: 26,,, | Performed by: RADIOLOGY

## 2023-07-10 PROCEDURE — 3078F PR MOST RECENT DIASTOLIC BLOOD PRESSURE < 80 MM HG: ICD-10-PCS | Mod: CPTII,S$GLB,, | Performed by: INTERNAL MEDICINE

## 2023-07-10 PROCEDURE — 3074F PR MOST RECENT SYSTOLIC BLOOD PRESSURE < 130 MM HG: ICD-10-PCS | Mod: CPTII,S$GLB,, | Performed by: INTERNAL MEDICINE

## 2023-07-10 NOTE — PROGRESS NOTES
Subjective:      Patient ID: Jessica Garcia is a 32 y.o. female.    Chief Complaint: Abdominal Pain    Abdominal pain:  Presents today as right upper quadrant abdominal pain.  Patient reports that this has been going on for about 3 weeks.  She reports hardness when she is feeling on her right upper quadrant.  She does note some pain associated with that a mild cramping as well.  She does suffer from chronic constipation and does have a bowel movement every 2-3 days.  She has been to emergency department for constipation previously.  Obtain AXR  and LFT.    Denies any chest pain, shortness of breath, nausea vomiting constipation diarrhea, blood in stool, heartburn    Review of Systems   Constitutional:  Negative for chills, fever and weight loss.   HENT:  Negative for congestion, ear pain and sore throat.    Eyes:  Negative for double vision.   Respiratory:  Negative for cough and shortness of breath.    Cardiovascular:  Negative for chest pain, palpitations and leg swelling.   Gastrointestinal:  Negative for abdominal pain, heartburn, nausea and vomiting.   Skin:  Negative for rash.   Neurological:  Negative for dizziness, tingling and headaches.   Psychiatric/Behavioral:  Negative for depression.        Current Outpatient Medications:     benzonatate (TESSALON) 100 MG capsule, Take 1 capsule (100 mg total) by mouth 3 (three) times daily as needed for Cough., Disp: 30 capsule, Rfl: 0    flibanserin 100 mg Tab, Take 1 tablet (100 mg) by mouth once daily., Disp: 30 tablet, Rfl: 12    gabapentin (NEURONTIN) 600 MG tablet, Take 600 mg by mouth every evening., Disp: , Rfl:     hydrocortisone 2.5 % cream, Apply topically 2 (two) times daily., Disp: 28 g, Rfl: 1    levocetirizine (XYZAL) 5 MG tablet, Take 1 tablet (5 mg total) by mouth every evening., Disp: 30 tablet, Rfl: 3    LO LOESTRIN FE 1 mg-10 mcg (24)/10 mcg (2) Tab, Take 1 tablet by mouth once daily., Disp: 84 tablet, Rfl: 1    magnesium oxide (MAG-OX) 400 mg  (241.3 mg magnesium) tablet, Take 1 tablet (400 mg total) by mouth once daily., Disp: 30 tablet, Rfl: 5    meloxicam (MOBIC) 15 MG tablet, Take 15 mg by mouth once daily., Disp: , Rfl:     methylPREDNISolone (MEDROL DOSEPACK) 4 mg tablet, use as directed, Disp: 1 each, Rfl: 0    ocrelizumab (OCREVUS IV), Inject into the vein., Disp: , Rfl:     sumatriptan (IMITREX) 50 MG tablet, Take 1 tablet at onset of headache; may take 2nd tablet 2 hours later if needed, but not to exceed two tabs per day., Disp: 9 tablet, Rfl: 0    triamcinolone acetonide 0.1% (KENALOG) 0.1 % cream, Apply to affected areas of body BID prn rash. Do not use on face, underarms, or groin., Disp: 80 g, Rfl: 3    ID NOW COVID-19 TEST KIT Kit, TEST AS DIRECTED TODAY, Disp: , Rfl:     polyethylene glycol (GLYCOLAX) 17 gram/dose powder, Take 17 g by mouth once daily. (Patient not taking: Reported on 7/10/2023), Disp: 235 g, Rfl: 0    Lab Results   Component Value Date    HGBA1C 5.3 08/07/2017     No results found for: MICALBCREAT  Lab Results   Component Value Date    LDLCALC 156.4 01/10/2022    LDLCALC 172.2 (H) 05/19/2016    CHOL 218 (H) 01/10/2022    HDL 43 01/10/2022    TRIG 93 01/10/2022       Lab Results   Component Value Date     02/27/2023    K 4.2 02/27/2023     02/27/2023    CO2 22 (L) 02/27/2023     (H) 02/27/2023    BUN 11 02/27/2023    CREATININE 0.7 02/27/2023    CALCIUM 8.7 02/27/2023    PROT 6.9 02/27/2023    ALBUMIN 3.4 (L) 02/27/2023    BILITOT 0.9 02/27/2023    ALKPHOS 32 (L) 02/27/2023    AST 21 02/27/2023    ALT 16 02/27/2023    ANIONGAP 8 02/27/2023    ESTGFRAFRICA >60.0 02/17/2022    EGFRNONAA >60.0 02/17/2022    WBC 6.17 02/27/2023    HGB 12.0 02/27/2023    HGB 13.4 08/03/2022    HCT 36.9 (L) 02/27/2023    MCV 95 02/27/2023     02/27/2023    TSH 0.858 01/10/2022    HEPCAB Negative 07/09/2021       Lab Results   Component Value Date    YTIMCKJM71OA 71 02/17/2022    DCGFMVEA90DY 82 04/05/2021    BSSCAGQB75  "588 11/17/2020    ZINC 65 07/09/2021         Past Medical History:   Diagnosis Date    Herniation of lumbar intervertebral disc     Hypercholesteremia     Multiple sclerosis 01/05/2021    Tachycardia      History reviewed. No pertinent surgical history.  Social History     Social History Narrative    The patient does not exercise regularly ().      Rates diet as fair.      She is not satisfied with weight.    She does not drink at least 1/2 gallon water daily.    She drinks 1 coffee/tea/caffeine-containing soft drinks daily.    Total sleep time at night is 7-8 hours.    She works 40 hours per week.    She does wear seat belts.    Hobbies include reading, movies.         Family History   Problem Relation Age of Onset    Thyroid disease Mother     Hypertension Sister     Heart disease Maternal Grandfather     Hypertension Paternal Grandmother     Hyperlipidemia Sister     Melanoma Neg Hx     Breast cancer Neg Hx     Colon cancer Neg Hx     Ovarian cancer Neg Hx      Vitals:    07/10/23 1440   BP: 110/65   Pulse: 110   Resp: 18   Temp: 98 °F (36.7 °C)   SpO2: 99%   Weight: 73.9 kg (162 lb 14.7 oz)   Height: 5' 5" (1.651 m)   PainSc: 0-No pain     Objective:   Physical Exam  Vitals reviewed.   Constitutional:       Appearance: Normal appearance.   HENT:      Head: Normocephalic.   Eyes:      Pupils: Pupils are equal, round, and reactive to light.   Cardiovascular:      Rate and Rhythm: Normal rate.      Pulses: Normal pulses.      Heart sounds: Normal heart sounds.   Abdominal:      General: Bowel sounds are normal.      Palpations: Abdomen is soft.          Comments: Slow bowel sound   Musculoskeletal:         General: Normal range of motion.   Skin:     General: Skin is warm.   Neurological:      General: No focal deficit present.      Mental Status: She is alert. Mental status is at baseline.   Psychiatric:         Mood and Affect: Mood normal.     Assessment:     1. Abdominal pain, unspecified abdominal location  "     Plan:     Orders Placed This Encounter    X-Ray Abdomen Flat And Erect    Hepatic Function Panel       There are no Patient Instructions on file for this visit.  All of your core healthy metrics are met.

## 2023-07-11 ENCOUNTER — PATIENT MESSAGE (OUTPATIENT)
Dept: PRIMARY CARE CLINIC | Facility: CLINIC | Age: 33
End: 2023-07-11
Payer: COMMERCIAL

## 2023-07-11 DIAGNOSIS — R16.0 HEPATOMEGALY: Primary | ICD-10-CM

## 2023-07-11 NOTE — PROGRESS NOTES
Normal X-ray, possibly distended stomach    Your LFT is normal.     I went ahead and ordered US of liver for you.

## 2023-07-17 ENCOUNTER — PATIENT MESSAGE (OUTPATIENT)
Dept: NEUROLOGY | Facility: CLINIC | Age: 33
End: 2023-07-17
Payer: COMMERCIAL

## 2023-07-17 DIAGNOSIS — L65.9 THINNING HAIR: ICD-10-CM

## 2023-07-17 DIAGNOSIS — G35 MULTIPLE SCLEROSIS: Primary | ICD-10-CM

## 2023-07-18 ENCOUNTER — PATIENT MESSAGE (OUTPATIENT)
Dept: NEUROLOGY | Facility: CLINIC | Age: 33
End: 2023-07-18
Payer: COMMERCIAL

## 2023-07-18 DIAGNOSIS — G35 MULTIPLE SCLEROSIS: Primary | ICD-10-CM

## 2023-07-21 ENCOUNTER — PATIENT MESSAGE (OUTPATIENT)
Dept: PSYCHIATRY | Facility: CLINIC | Age: 33
End: 2023-07-21
Payer: COMMERCIAL

## 2023-07-25 ENCOUNTER — TELEPHONE (OUTPATIENT)
Dept: PRIMARY CARE CLINIC | Facility: CLINIC | Age: 33
End: 2023-07-25
Payer: COMMERCIAL

## 2023-07-25 ENCOUNTER — PATIENT MESSAGE (OUTPATIENT)
Dept: PRIMARY CARE CLINIC | Facility: CLINIC | Age: 33
End: 2023-07-25
Payer: COMMERCIAL

## 2023-07-25 ENCOUNTER — PATIENT MESSAGE (OUTPATIENT)
Dept: FAMILY MEDICINE | Facility: CLINIC | Age: 33
End: 2023-07-25
Payer: COMMERCIAL

## 2023-07-25 ENCOUNTER — PATIENT MESSAGE (OUTPATIENT)
Dept: NEUROLOGY | Facility: CLINIC | Age: 33
End: 2023-07-25
Payer: COMMERCIAL

## 2023-07-25 ENCOUNTER — HOSPITAL ENCOUNTER (OUTPATIENT)
Dept: RADIOLOGY | Facility: HOSPITAL | Age: 33
Discharge: HOME | End: 2023-07-25
Attending: INTERNAL MEDICINE
Payer: COMMERCIAL

## 2023-07-25 DIAGNOSIS — K76.9 HEPATIC LESION: ICD-10-CM

## 2023-07-25 DIAGNOSIS — R16.0 HEPATOMEGALY: ICD-10-CM

## 2023-07-25 DIAGNOSIS — R16.0 HEPATOMEGALY: Primary | ICD-10-CM

## 2023-07-25 PROCEDURE — 76705 US ABDOMEN LIMITED: ICD-10-PCS | Mod: 26,,, | Performed by: RADIOLOGY

## 2023-07-25 PROCEDURE — 25500020 PHARM REV CODE 255

## 2023-07-25 PROCEDURE — 76705 ECHO EXAM OF ABDOMEN: CPT | Mod: TC

## 2023-07-25 PROCEDURE — 74177 CT ABD & PELVIS W/CONTRAST: CPT | Mod: 26,,, | Performed by: RADIOLOGY

## 2023-07-25 PROCEDURE — 76705 ECHO EXAM OF ABDOMEN: CPT | Mod: 26,,, | Performed by: RADIOLOGY

## 2023-07-25 PROCEDURE — 74177 CT ABD & PELVIS W/CONTRAST: CPT | Mod: TC

## 2023-07-25 PROCEDURE — 74177 CT ABDOMEN PELVIS WITH CONTRAST: ICD-10-PCS | Mod: 26,,, | Performed by: RADIOLOGY

## 2023-07-25 RX ADMIN — IOHEXOL 75 ML: 350 INJECTION, SOLUTION INTRAVENOUS at 05:07

## 2023-07-25 NOTE — PROGRESS NOTES
Staff: I ordered her CT scan of the liver. Please get her an appointment with her PCP to further follow up    US of your liver does show some liver enlargement along with liver lesions. I did go ahead and order you a CT scan to further the work up.     Please follow up with your primary care doctor and set up an appointment with her for continued care.

## 2023-07-26 ENCOUNTER — TELEPHONE (OUTPATIENT)
Dept: FAMILY MEDICINE | Facility: CLINIC | Age: 33
End: 2023-07-26
Payer: COMMERCIAL

## 2023-07-26 ENCOUNTER — PATIENT MESSAGE (OUTPATIENT)
Dept: NEUROLOGY | Facility: CLINIC | Age: 33
End: 2023-07-26
Payer: COMMERCIAL

## 2023-07-26 ENCOUNTER — PATIENT MESSAGE (OUTPATIENT)
Dept: FAMILY MEDICINE | Facility: CLINIC | Age: 33
End: 2023-07-26
Payer: COMMERCIAL

## 2023-07-26 DIAGNOSIS — R16.0 HEPATOMEGALY: Primary | ICD-10-CM

## 2023-07-26 DIAGNOSIS — K76.9 LIVER LESION: ICD-10-CM

## 2023-07-26 DIAGNOSIS — K76.9 LIVER LESION: Primary | ICD-10-CM

## 2023-07-26 NOTE — TELEPHONE ENCOUNTER
Pt would like to speak with  ASAP she has two tumors on her Ct scan and she's freaking out. Staff offer patient virtual for 8/1 with Dr. Guzman patient stated she don't think she can wait that long.

## 2023-07-26 NOTE — PROGRESS NOTES
CT scan of your abdomen does show that you have enlarged liver with tumor. I see that you have an appointment with a hepatologist and with your primary care doctor. Please keep your appointment with your physicians so we can further work up in regards to your liver lesion. Hopefully we can get more answers from the liver specialists.     Incidentally, you have cysts in the kidney. This is most likely not concerning. I would recommend that you follow this up to make sure they do not enlarge.

## 2023-07-26 NOTE — TELEPHONE ENCOUNTER
----- Message from Zach Sim sent at 7/26/2023 10:03 AM CDT -----      Name of Who is Calling: MELINDA PEMBERTON [[id number]]      What is the request in detail: Pt returned call to the office.Please contact to further discuss and advise.          Can the clinic reply by [facility]: Y      What Number to Call Back if not in [facility]: [phone]

## 2023-07-26 NOTE — TELEPHONE ENCOUNTER
----- Message from Masha Linda sent at 7/26/2023  9:38 AM CDT -----  Regarding: appt  Contact: [phone]  Pt Jsesica is calling. Pt would like to schedule an appt as soon as possible regarding ct scan results. Pt reported the ct scan shows two tumors. Pt is okay with a virtual appt as well. Pt is very nervous. Please give her a call back at [phone].

## 2023-07-27 ENCOUNTER — PATIENT MESSAGE (OUTPATIENT)
Dept: NEUROLOGY | Facility: CLINIC | Age: 33
End: 2023-07-27
Payer: COMMERCIAL

## 2023-08-01 ENCOUNTER — OFFICE VISIT (OUTPATIENT)
Dept: FAMILY MEDICINE | Facility: CLINIC | Age: 33
End: 2023-08-01
Attending: FAMILY MEDICINE
Payer: COMMERCIAL

## 2023-08-01 ENCOUNTER — LAB VISIT (OUTPATIENT)
Dept: LAB | Facility: HOSPITAL | Age: 33
End: 2023-08-01
Payer: COMMERCIAL

## 2023-08-01 DIAGNOSIS — R73.9 HYPERGLYCEMIA: ICD-10-CM

## 2023-08-01 DIAGNOSIS — D49.0 LIVER TUMOR: Primary | ICD-10-CM

## 2023-08-01 DIAGNOSIS — G35 MULTIPLE SCLEROSIS: ICD-10-CM

## 2023-08-01 LAB
ALBUMIN SERPL BCP-MCNC: 3.9 G/DL (ref 3.5–5.2)
ALP SERPL-CCNC: 43 U/L (ref 55–135)
ALT SERPL W/O P-5'-P-CCNC: 24 U/L (ref 10–44)
ANION GAP SERPL CALC-SCNC: 13 MMOL/L (ref 8–16)
ANION GAP SERPL CALC-SCNC: 13 MMOL/L (ref 8–16)
AST SERPL-CCNC: 42 U/L (ref 10–40)
BASOPHILS # BLD AUTO: 0.07 K/UL (ref 0–0.2)
BASOPHILS NFR BLD: 1 % (ref 0–1.9)
BILIRUB SERPL-MCNC: 1 MG/DL (ref 0.1–1)
BUN SERPL-MCNC: 9 MG/DL (ref 6–20)
BUN SERPL-MCNC: 9 MG/DL (ref 6–20)
CALCIUM SERPL-MCNC: 9.8 MG/DL (ref 8.7–10.5)
CALCIUM SERPL-MCNC: 9.8 MG/DL (ref 8.7–10.5)
CHLORIDE SERPL-SCNC: 99 MMOL/L (ref 95–110)
CHLORIDE SERPL-SCNC: 99 MMOL/L (ref 95–110)
CO2 SERPL-SCNC: 25 MMOL/L (ref 23–29)
CO2 SERPL-SCNC: 25 MMOL/L (ref 23–29)
CREAT SERPL-MCNC: 0.7 MG/DL (ref 0.5–1.4)
CREAT SERPL-MCNC: 0.7 MG/DL (ref 0.5–1.4)
DIFFERENTIAL METHOD: ABNORMAL
EOSINOPHIL # BLD AUTO: 0.1 K/UL (ref 0–0.5)
EOSINOPHIL NFR BLD: 1 % (ref 0–8)
ERYTHROCYTE [DISTWIDTH] IN BLOOD BY AUTOMATED COUNT: 13.3 % (ref 11.5–14.5)
EST. GFR  (NO RACE VARIABLE): >60 ML/MIN/1.73 M^2
EST. GFR  (NO RACE VARIABLE): >60 ML/MIN/1.73 M^2
ESTIMATED AVG GLUCOSE: 103 MG/DL (ref 68–131)
GLUCOSE SERPL-MCNC: 71 MG/DL (ref 70–110)
GLUCOSE SERPL-MCNC: 71 MG/DL (ref 70–110)
HBA1C MFR BLD: 5.2 % (ref 4–5.6)
HCT VFR BLD AUTO: 38.9 % (ref 37–48.5)
HGB BLD-MCNC: 12.1 G/DL (ref 12–16)
IGA SERPL-MCNC: 340 MG/DL (ref 40–350)
IGG SERPL-MCNC: 1279 MG/DL (ref 650–1600)
IGM SERPL-MCNC: 44 MG/DL (ref 50–300)
IMM GRANULOCYTES # BLD AUTO: 0.01 K/UL (ref 0–0.04)
IMM GRANULOCYTES NFR BLD AUTO: 0.1 % (ref 0–0.5)
LYMPHOCYTES # BLD AUTO: 3.3 K/UL (ref 1–4.8)
LYMPHOCYTES NFR BLD: 45.7 % (ref 18–48)
MCH RBC QN AUTO: 29.8 PG (ref 27–31)
MCHC RBC AUTO-ENTMCNC: 31.1 G/DL (ref 32–36)
MCV RBC AUTO: 96 FL (ref 82–98)
MONOCYTES # BLD AUTO: 0.6 K/UL (ref 0.3–1)
MONOCYTES NFR BLD: 8.1 % (ref 4–15)
NEUTROPHILS # BLD AUTO: 3.2 K/UL (ref 1.8–7.7)
NEUTROPHILS NFR BLD: 44.1 % (ref 38–73)
NRBC BLD-RTO: 0 /100 WBC
PLATELET # BLD AUTO: 473 K/UL (ref 150–450)
PMV BLD AUTO: 10.3 FL (ref 9.2–12.9)
POTASSIUM SERPL-SCNC: 3.9 MMOL/L (ref 3.5–5.1)
POTASSIUM SERPL-SCNC: 3.9 MMOL/L (ref 3.5–5.1)
PROT SERPL-MCNC: 7.8 G/DL (ref 6–8.4)
RBC # BLD AUTO: 4.06 M/UL (ref 4–5.4)
SODIUM SERPL-SCNC: 137 MMOL/L (ref 136–145)
SODIUM SERPL-SCNC: 137 MMOL/L (ref 136–145)
WBC # BLD AUTO: 7.14 K/UL (ref 3.9–12.7)

## 2023-08-01 PROCEDURE — 83036 HEMOGLOBIN GLYCOSYLATED A1C: CPT | Performed by: FAMILY MEDICINE

## 2023-08-01 PROCEDURE — 80074 ACUTE HEPATITIS PANEL: CPT | Performed by: FAMILY MEDICINE

## 2023-08-01 PROCEDURE — 3044F HG A1C LEVEL LT 7.0%: CPT | Mod: CPTII,95,, | Performed by: FAMILY MEDICINE

## 2023-08-01 PROCEDURE — 3044F PR MOST RECENT HEMOGLOBIN A1C LEVEL <7.0%: ICD-10-PCS | Mod: CPTII,95,, | Performed by: FAMILY MEDICINE

## 2023-08-01 PROCEDURE — 1160F PR REVIEW ALL MEDS BY PRESCRIBER/CLIN PHARMACIST DOCUMENTED: ICD-10-PCS | Mod: CPTII,95,, | Performed by: FAMILY MEDICINE

## 2023-08-01 PROCEDURE — 1159F MED LIST DOCD IN RCRD: CPT | Mod: CPTII,95,, | Performed by: FAMILY MEDICINE

## 2023-08-01 PROCEDURE — 3008F PR BODY MASS INDEX (BMI) DOCUMENTED: ICD-10-PCS | Mod: CPTII,95,, | Performed by: FAMILY MEDICINE

## 2023-08-01 PROCEDURE — 85025 COMPLETE CBC W/AUTO DIFF WBC: CPT

## 2023-08-01 PROCEDURE — 99213 PR OFFICE/OUTPT VISIT, EST, LEVL III, 20-29 MIN: ICD-10-PCS | Mod: NDTC,,, | Performed by: FAMILY MEDICINE

## 2023-08-01 PROCEDURE — 36415 COLL VENOUS BLD VENIPUNCTURE: CPT | Mod: PN

## 2023-08-01 PROCEDURE — 1160F RVW MEDS BY RX/DR IN RCRD: CPT | Mod: CPTII,95,, | Performed by: FAMILY MEDICINE

## 2023-08-01 PROCEDURE — 82784 ASSAY IGA/IGD/IGG/IGM EACH: CPT | Mod: 59

## 2023-08-01 PROCEDURE — 99213 OFFICE O/P EST LOW 20 MIN: CPT | Mod: NDTC,,, | Performed by: FAMILY MEDICINE

## 2023-08-01 PROCEDURE — 80053 COMPREHEN METABOLIC PANEL: CPT

## 2023-08-01 PROCEDURE — 1159F PR MEDICATION LIST DOCUMENTED IN MEDICAL RECORD: ICD-10-PCS | Mod: CPTII,95,, | Performed by: FAMILY MEDICINE

## 2023-08-01 PROCEDURE — 86704 HEP B CORE ANTIBODY TOTAL: CPT

## 2023-08-01 PROCEDURE — 3008F BODY MASS INDEX DOCD: CPT | Mod: CPTII,95,, | Performed by: FAMILY MEDICINE

## 2023-08-02 ENCOUNTER — PATIENT MESSAGE (OUTPATIENT)
Dept: NEUROLOGY | Facility: CLINIC | Age: 33
End: 2023-08-02
Payer: COMMERCIAL

## 2023-08-02 DIAGNOSIS — R89.9 ABNORMAL LABORATORY TEST RESULT: Primary | ICD-10-CM

## 2023-08-02 LAB
HAV IGM SERPL QL IA: NORMAL
HBV CORE AB SERPL QL IA: NORMAL
HBV CORE IGM SERPL QL IA: NORMAL
HBV SURFACE AG SERPL QL IA: NORMAL
HBV SURFACE AG SERPL QL IA: NORMAL
HCV AB SERPL QL IA: NORMAL

## 2023-08-03 ENCOUNTER — TELEPHONE (OUTPATIENT)
Dept: NEUROLOGY | Facility: CLINIC | Age: 33
End: 2023-08-03
Payer: COMMERCIAL

## 2023-08-03 ENCOUNTER — PATIENT MESSAGE (OUTPATIENT)
Dept: FAMILY MEDICINE | Facility: CLINIC | Age: 33
End: 2023-08-03
Payer: COMMERCIAL

## 2023-08-03 NOTE — TELEPHONE ENCOUNTER
----- Message from Leigh Cline, APRN, CNS sent at 8/2/2023 12:30 PM CDT -----  Stable immunoglobulins   AST slightly elevated   Normal WBC and ALC; elevated platelets   Negative hep B labs     She can be scheduled for August infusion

## 2023-08-04 RX ORDER — SODIUM CHLORIDE 0.9 % (FLUSH) 0.9 %
10 SYRINGE (ML) INJECTION
OUTPATIENT
Start: 2023-08-04

## 2023-08-04 RX ORDER — HEPARIN 100 UNIT/ML
500 SYRINGE INTRAVENOUS
OUTPATIENT
Start: 2023-08-04

## 2023-08-04 RX ORDER — EPINEPHRINE 0.3 MG/.3ML
0.3 INJECTION SUBCUTANEOUS
OUTPATIENT
Start: 2023-08-04

## 2023-08-04 RX ORDER — ACETAMINOPHEN 500 MG
1000 TABLET ORAL
OUTPATIENT
Start: 2023-08-04

## 2023-08-04 RX ORDER — FAMOTIDINE 10 MG/ML
20 INJECTION INTRAVENOUS
OUTPATIENT
Start: 2023-08-04

## 2023-08-04 RX ORDER — DIPHENHYDRAMINE HYDROCHLORIDE 50 MG/ML
50 INJECTION INTRAMUSCULAR; INTRAVENOUS
OUTPATIENT
Start: 2023-08-04

## 2023-08-07 ENCOUNTER — LAB VISIT (OUTPATIENT)
Dept: LAB | Facility: HOSPITAL | Age: 33
End: 2023-08-07
Attending: CLINICAL NURSE SPECIALIST
Payer: COMMERCIAL

## 2023-08-07 DIAGNOSIS — R89.9 ABNORMAL LABORATORY TEST RESULT: ICD-10-CM

## 2023-08-07 LAB
ALBUMIN SERPL BCP-MCNC: 3.6 G/DL (ref 3.5–5.2)
ALP SERPL-CCNC: 40 U/L (ref 55–135)
ALT SERPL W/O P-5'-P-CCNC: 21 U/L (ref 10–44)
AST SERPL-CCNC: 39 U/L (ref 10–40)
BASOPHILS # BLD AUTO: 0.08 K/UL (ref 0–0.2)
BASOPHILS NFR BLD: 0.9 % (ref 0–1.9)
BILIRUB DIRECT SERPL-MCNC: 0.5 MG/DL (ref 0.1–0.3)
BILIRUB SERPL-MCNC: 1.2 MG/DL (ref 0.1–1)
DIFFERENTIAL METHOD: ABNORMAL
EOSINOPHIL # BLD AUTO: 0.1 K/UL (ref 0–0.5)
EOSINOPHIL NFR BLD: 1.1 % (ref 0–8)
ERYTHROCYTE [DISTWIDTH] IN BLOOD BY AUTOMATED COUNT: 13.6 % (ref 11.5–14.5)
HCT VFR BLD AUTO: 36.4 % (ref 37–48.5)
HGB BLD-MCNC: 11.7 G/DL (ref 12–16)
IMM GRANULOCYTES # BLD AUTO: 0.03 K/UL (ref 0–0.04)
IMM GRANULOCYTES NFR BLD AUTO: 0.3 % (ref 0–0.5)
LYMPHOCYTES # BLD AUTO: 2.7 K/UL (ref 1–4.8)
LYMPHOCYTES NFR BLD: 30.2 % (ref 18–48)
MCH RBC QN AUTO: 30.3 PG (ref 27–31)
MCHC RBC AUTO-ENTMCNC: 32.1 G/DL (ref 32–36)
MCV RBC AUTO: 94 FL (ref 82–98)
MONOCYTES # BLD AUTO: 0.9 K/UL (ref 0.3–1)
MONOCYTES NFR BLD: 10.5 % (ref 4–15)
NEUTROPHILS # BLD AUTO: 5 K/UL (ref 1.8–7.7)
NEUTROPHILS NFR BLD: 57 % (ref 38–73)
NRBC BLD-RTO: 0 /100 WBC
PLATELET # BLD AUTO: 421 K/UL (ref 150–450)
PMV BLD AUTO: 10.4 FL (ref 9.2–12.9)
PROT SERPL-MCNC: 7.2 G/DL (ref 6–8.4)
RBC # BLD AUTO: 3.86 M/UL (ref 4–5.4)
WBC # BLD AUTO: 8.85 K/UL (ref 3.9–12.7)

## 2023-08-07 PROCEDURE — 80076 HEPATIC FUNCTION PANEL: CPT | Performed by: CLINICAL NURSE SPECIALIST

## 2023-08-07 PROCEDURE — 85025 COMPLETE CBC W/AUTO DIFF WBC: CPT | Performed by: CLINICAL NURSE SPECIALIST

## 2023-08-07 PROCEDURE — 36415 COLL VENOUS BLD VENIPUNCTURE: CPT | Mod: PO | Performed by: CLINICAL NURSE SPECIALIST

## 2023-08-08 ENCOUNTER — LAB VISIT (OUTPATIENT)
Dept: LAB | Facility: HOSPITAL | Age: 33
End: 2023-08-08
Attending: INTERNAL MEDICINE
Payer: COMMERCIAL

## 2023-08-08 ENCOUNTER — OFFICE VISIT (OUTPATIENT)
Dept: OBSTETRICS AND GYNECOLOGY | Facility: CLINIC | Age: 33
End: 2023-08-08
Payer: COMMERCIAL

## 2023-08-08 ENCOUNTER — TELEPHONE (OUTPATIENT)
Dept: HEPATOLOGY | Facility: CLINIC | Age: 33
End: 2023-08-08
Payer: COMMERCIAL

## 2023-08-08 ENCOUNTER — OFFICE VISIT (OUTPATIENT)
Dept: HEPATOLOGY | Facility: CLINIC | Age: 33
End: 2023-08-08
Payer: COMMERCIAL

## 2023-08-08 VITALS
RESPIRATION RATE: 18 BRPM | HEIGHT: 65 IN | DIASTOLIC BLOOD PRESSURE: 67 MMHG | WEIGHT: 158.5 LBS | HEART RATE: 96 BPM | BODY MASS INDEX: 26.41 KG/M2 | SYSTOLIC BLOOD PRESSURE: 122 MMHG | OXYGEN SATURATION: 99 % | TEMPERATURE: 97 F

## 2023-08-08 VITALS
WEIGHT: 158.38 LBS | BODY MASS INDEX: 26.36 KG/M2 | SYSTOLIC BLOOD PRESSURE: 124 MMHG | DIASTOLIC BLOOD PRESSURE: 60 MMHG

## 2023-08-08 DIAGNOSIS — K76.9 LIVER LESION: ICD-10-CM

## 2023-08-08 DIAGNOSIS — R68.82 DECREASED LIBIDO: Primary | ICD-10-CM

## 2023-08-08 LAB
AFP SERPL-MCNC: <2 NG/ML (ref 0–8.4)
ALBUMIN SERPL BCP-MCNC: 3.8 G/DL (ref 3.5–5.2)
ALP SERPL-CCNC: 44 U/L (ref 55–135)
ALT SERPL W/O P-5'-P-CCNC: 21 U/L (ref 10–44)
ANION GAP SERPL CALC-SCNC: 10 MMOL/L (ref 8–16)
AST SERPL-CCNC: 41 U/L (ref 10–40)
BASOPHILS # BLD AUTO: 0.07 K/UL (ref 0–0.2)
BASOPHILS NFR BLD: 1.1 % (ref 0–1.9)
BILIRUB SERPL-MCNC: 1.2 MG/DL (ref 0.1–1)
BUN SERPL-MCNC: 10 MG/DL (ref 6–20)
CALCIUM SERPL-MCNC: 9.5 MG/DL (ref 8.7–10.5)
CANCER AG19-9 SERPL-ACNC: 26.8 U/ML (ref 0–40)
CHLORIDE SERPL-SCNC: 102 MMOL/L (ref 95–110)
CO2 SERPL-SCNC: 27 MMOL/L (ref 23–29)
CREAT SERPL-MCNC: 0.8 MG/DL (ref 0.5–1.4)
DIFFERENTIAL METHOD: ABNORMAL
EOSINOPHIL # BLD AUTO: 0.1 K/UL (ref 0–0.5)
EOSINOPHIL NFR BLD: 1.1 % (ref 0–8)
ERYTHROCYTE [DISTWIDTH] IN BLOOD BY AUTOMATED COUNT: 13.4 % (ref 11.5–14.5)
EST. GFR  (NO RACE VARIABLE): >60 ML/MIN/1.73 M^2
GLUCOSE SERPL-MCNC: 84 MG/DL (ref 70–110)
HBV CORE AB SERPL QL IA: NORMAL
HBV SURFACE AG SERPL QL IA: NORMAL
HCT VFR BLD AUTO: 37.7 % (ref 37–48.5)
HCV AB SERPL QL IA: NORMAL
HGB BLD-MCNC: 12.1 G/DL (ref 12–16)
IMM GRANULOCYTES # BLD AUTO: 0.02 K/UL (ref 0–0.04)
IMM GRANULOCYTES NFR BLD AUTO: 0.3 % (ref 0–0.5)
INR PPP: 1.2 (ref 0.8–1.2)
LYMPHOCYTES # BLD AUTO: 2 K/UL (ref 1–4.8)
LYMPHOCYTES NFR BLD: 31.1 % (ref 18–48)
MCH RBC QN AUTO: 30.4 PG (ref 27–31)
MCHC RBC AUTO-ENTMCNC: 32.1 G/DL (ref 32–36)
MCV RBC AUTO: 95 FL (ref 82–98)
MONOCYTES # BLD AUTO: 0.7 K/UL (ref 0.3–1)
MONOCYTES NFR BLD: 10.4 % (ref 4–15)
NEUTROPHILS # BLD AUTO: 3.7 K/UL (ref 1.8–7.7)
NEUTROPHILS NFR BLD: 56 % (ref 38–73)
NRBC BLD-RTO: 0 /100 WBC
PLATELET # BLD AUTO: 405 K/UL (ref 150–450)
PMV BLD AUTO: 10.1 FL (ref 9.2–12.9)
POTASSIUM SERPL-SCNC: 4.2 MMOL/L (ref 3.5–5.1)
PROT SERPL-MCNC: 7.8 G/DL (ref 6–8.4)
PROTHROMBIN TIME: 12.7 SEC (ref 9–12.5)
RBC # BLD AUTO: 3.98 M/UL (ref 4–5.4)
SODIUM SERPL-SCNC: 139 MMOL/L (ref 136–145)
WBC # BLD AUTO: 6.52 K/UL (ref 3.9–12.7)

## 2023-08-08 PROCEDURE — 3044F HG A1C LEVEL LT 7.0%: CPT | Mod: CPTII,S$GLB,, | Performed by: OBSTETRICS & GYNECOLOGY

## 2023-08-08 PROCEDURE — 99999 PR PBB SHADOW E&M-EST. PATIENT-LVL IV: CPT | Mod: PBBFAC,,, | Performed by: INTERNAL MEDICINE

## 2023-08-08 PROCEDURE — 99214 PR OFFICE/OUTPT VISIT, EST, LEVL IV, 30-39 MIN: ICD-10-PCS | Mod: S$GLB,,, | Performed by: OBSTETRICS & GYNECOLOGY

## 2023-08-08 PROCEDURE — 3078F PR MOST RECENT DIASTOLIC BLOOD PRESSURE < 80 MM HG: ICD-10-PCS | Mod: CPTII,S$GLB,, | Performed by: INTERNAL MEDICINE

## 2023-08-08 PROCEDURE — 3008F PR BODY MASS INDEX (BMI) DOCUMENTED: ICD-10-PCS | Mod: CPTII,S$GLB,, | Performed by: INTERNAL MEDICINE

## 2023-08-08 PROCEDURE — 36415 COLL VENOUS BLD VENIPUNCTURE: CPT | Performed by: INTERNAL MEDICINE

## 2023-08-08 PROCEDURE — 1160F PR REVIEW ALL MEDS BY PRESCRIBER/CLIN PHARMACIST DOCUMENTED: ICD-10-PCS | Mod: CPTII,S$GLB,, | Performed by: INTERNAL MEDICINE

## 2023-08-08 PROCEDURE — 3008F PR BODY MASS INDEX (BMI) DOCUMENTED: ICD-10-PCS | Mod: CPTII,S$GLB,, | Performed by: OBSTETRICS & GYNECOLOGY

## 2023-08-08 PROCEDURE — 3078F PR MOST RECENT DIASTOLIC BLOOD PRESSURE < 80 MM HG: ICD-10-PCS | Mod: CPTII,S$GLB,, | Performed by: OBSTETRICS & GYNECOLOGY

## 2023-08-08 PROCEDURE — 1159F MED LIST DOCD IN RCRD: CPT | Mod: CPTII,S$GLB,, | Performed by: INTERNAL MEDICINE

## 2023-08-08 PROCEDURE — 3044F PR MOST RECENT HEMOGLOBIN A1C LEVEL <7.0%: ICD-10-PCS | Mod: CPTII,S$GLB,, | Performed by: OBSTETRICS & GYNECOLOGY

## 2023-08-08 PROCEDURE — 3074F SYST BP LT 130 MM HG: CPT | Mod: CPTII,S$GLB,, | Performed by: INTERNAL MEDICINE

## 2023-08-08 PROCEDURE — 3008F BODY MASS INDEX DOCD: CPT | Mod: CPTII,S$GLB,, | Performed by: INTERNAL MEDICINE

## 2023-08-08 PROCEDURE — 3074F SYST BP LT 130 MM HG: CPT | Mod: CPTII,S$GLB,, | Performed by: OBSTETRICS & GYNECOLOGY

## 2023-08-08 PROCEDURE — 86803 HEPATITIS C AB TEST: CPT | Performed by: INTERNAL MEDICINE

## 2023-08-08 PROCEDURE — 3074F PR MOST RECENT SYSTOLIC BLOOD PRESSURE < 130 MM HG: ICD-10-PCS | Mod: CPTII,S$GLB,, | Performed by: OBSTETRICS & GYNECOLOGY

## 2023-08-08 PROCEDURE — 99999 PR PBB SHADOW E&M-EST. PATIENT-LVL II: ICD-10-PCS | Mod: PBBFAC,,, | Performed by: OBSTETRICS & GYNECOLOGY

## 2023-08-08 PROCEDURE — 3078F DIAST BP <80 MM HG: CPT | Mod: CPTII,S$GLB,, | Performed by: OBSTETRICS & GYNECOLOGY

## 2023-08-08 PROCEDURE — 3078F DIAST BP <80 MM HG: CPT | Mod: CPTII,S$GLB,, | Performed by: INTERNAL MEDICINE

## 2023-08-08 PROCEDURE — 1160F RVW MEDS BY RX/DR IN RCRD: CPT | Mod: CPTII,S$GLB,, | Performed by: INTERNAL MEDICINE

## 2023-08-08 PROCEDURE — 1159F PR MEDICATION LIST DOCUMENTED IN MEDICAL RECORD: ICD-10-PCS | Mod: CPTII,S$GLB,, | Performed by: INTERNAL MEDICINE

## 2023-08-08 PROCEDURE — 80053 COMPREHEN METABOLIC PANEL: CPT | Performed by: INTERNAL MEDICINE

## 2023-08-08 PROCEDURE — 99214 OFFICE O/P EST MOD 30 MIN: CPT | Mod: S$GLB,,, | Performed by: OBSTETRICS & GYNECOLOGY

## 2023-08-08 PROCEDURE — 87340 HEPATITIS B SURFACE AG IA: CPT | Performed by: INTERNAL MEDICINE

## 2023-08-08 PROCEDURE — 99205 PR OFFICE/OUTPT VISIT, NEW, LEVL V, 60-74 MIN: ICD-10-PCS | Mod: S$GLB,,, | Performed by: INTERNAL MEDICINE

## 2023-08-08 PROCEDURE — 99999 PR PBB SHADOW E&M-EST. PATIENT-LVL IV: ICD-10-PCS | Mod: PBBFAC,,, | Performed by: INTERNAL MEDICINE

## 2023-08-08 PROCEDURE — 3044F PR MOST RECENT HEMOGLOBIN A1C LEVEL <7.0%: ICD-10-PCS | Mod: CPTII,S$GLB,, | Performed by: INTERNAL MEDICINE

## 2023-08-08 PROCEDURE — 3008F BODY MASS INDEX DOCD: CPT | Mod: CPTII,S$GLB,, | Performed by: OBSTETRICS & GYNECOLOGY

## 2023-08-08 PROCEDURE — 85610 PROTHROMBIN TIME: CPT | Performed by: INTERNAL MEDICINE

## 2023-08-08 PROCEDURE — 85025 COMPLETE CBC W/AUTO DIFF WBC: CPT | Performed by: INTERNAL MEDICINE

## 2023-08-08 PROCEDURE — 99999 PR PBB SHADOW E&M-EST. PATIENT-LVL II: CPT | Mod: PBBFAC,,, | Performed by: OBSTETRICS & GYNECOLOGY

## 2023-08-08 PROCEDURE — 99205 OFFICE O/P NEW HI 60 MIN: CPT | Mod: S$GLB,,, | Performed by: INTERNAL MEDICINE

## 2023-08-08 PROCEDURE — 86704 HEP B CORE ANTIBODY TOTAL: CPT | Performed by: INTERNAL MEDICINE

## 2023-08-08 PROCEDURE — 3074F PR MOST RECENT SYSTOLIC BLOOD PRESSURE < 130 MM HG: ICD-10-PCS | Mod: CPTII,S$GLB,, | Performed by: INTERNAL MEDICINE

## 2023-08-08 PROCEDURE — 86301 IMMUNOASSAY TUMOR CA 19-9: CPT | Performed by: INTERNAL MEDICINE

## 2023-08-08 PROCEDURE — 3044F HG A1C LEVEL LT 7.0%: CPT | Mod: CPTII,S$GLB,, | Performed by: INTERNAL MEDICINE

## 2023-08-08 PROCEDURE — 82105 ALPHA-FETOPROTEIN SERUM: CPT | Performed by: INTERNAL MEDICINE

## 2023-08-08 NOTE — TELEPHONE ENCOUNTER
----- Message from Saud Tamez sent at 8/8/2023  4:49 PM CDT -----  Regarding: Returning a Missed Call  Contact: Jessica Garcia  Returning a Missed Call    Caller: Jessica Garcia      Returning call to: Dr. Romano or staff       Caller can be reached @: [phone] (home)      Nature of the call: Patient returning call to Dr. Romano or staff regarding visit today.

## 2023-08-08 NOTE — PROGRESS NOTES
Subjective:       Patient ID: Jessica Garcia is a 32 y.o. female.    Chief Complaint: No chief complaint on file.    HPI  I saw this 32 y.o. lady who came to the liver clinic with her .    Minimally elevated LFTs  Body mass index is 26.38 kg/m².    Indigestion and RUQ 'hardness' since April. Some associated constipation though this has resolved. No specific pain in the RUQ.   20lb weight loss since the start of the year (79 to 71kg). Appetite is good but gets early satiety. No fevers, chills, night sweats.    Stopped birth control 2 weeks ago.  Stopped flibanserin (for libido) a few months ago.       Investigations:  IgG normal  HBV/HCV tia    CT abdo: 7/25/2023  There is hepatomegaly noted measuring 27 cm transversely..  Most of the right lobe of the liver has been replaced by a quite heterogeneous solid mass measuring up to 19 cm in diameter.  There is a 2nd mass identified in the left lobe measuring 3.8 cm.  A 3rd mass in the left lobe measures 5.6 cm.  The differential is of focal nodular hyperplasia versus neoplasm.  A pathologic report is not found within the chart.  A small amount of fluid is seen inferior to the liver.  A gallbladder is not identified.  The pancreas appears of normal contour and CT density without focal mass or edema.  The spleen is small and of normal CT density.    MRI abdo: 11/10/20  3 cm right hepatic lobe lesion with MRI characteristics of focal nodular hyperplasia.  1 cm left hepatic lobe lesion too small to characterize as an additional focus of FNH although statistically, lesions of this size are almost always benign especially when found incidentally.  Differential diagnosis would include a flash filling capillary hemangioma, smaller FNH, hepatic adenoma etc    PMH:  Migraines  Multiple sclerosis      SH:  No smoker  Very rarely drinks alcohol, about 1 drink a month  No other drug use      FH:  No known liver problems in family  Counsin recently passed away from breast  cancer at age 49      Review of Systems   Constitutional:  Negative for activity change, appetite change, chills, fatigue, fever and unexpected weight change.   HENT:  Negative for ear pain, hearing loss, nosebleeds, sore throat and trouble swallowing.    Eyes:  Negative for redness and visual disturbance.   Respiratory:  Negative for cough, chest tightness, shortness of breath and wheezing.    Cardiovascular:  Negative for chest pain and palpitations.   Gastrointestinal:  Negative for abdominal distention, abdominal pain, blood in stool, constipation, diarrhea, nausea and vomiting.   Genitourinary:  Negative for difficulty urinating, dysuria, frequency, hematuria and urgency.   Musculoskeletal:  Negative for arthralgias, back pain, gait problem, joint swelling and myalgias.   Skin:  Negative for rash.   Neurological:  Negative for tremors, seizures, speech difficulty, weakness and headaches.   Hematological:  Negative for adenopathy.   Psychiatric/Behavioral:  Negative for confusion, decreased concentration and sleep disturbance. The patient is not nervous/anxious.          Lab Results   Component Value Date    ALT 21 08/08/2023    AST 41 (H) 08/08/2023    ALKPHOS 44 (L) 08/08/2023    BILITOT 1.2 (H) 08/08/2023     Past Medical History:   Diagnosis Date    Herniation of lumbar intervertebral disc     Hypercholesteremia     Multiple sclerosis 01/05/2021    Tachycardia      No past surgical history on file.  Current Outpatient Medications   Medication Sig    flibanserin 100 mg Tab Take 1 tablet (100 mg) by mouth once daily.    gabapentin (NEURONTIN) 600 MG tablet Take 600 mg by mouth every evening.    hydrocortisone 2.5 % cream Apply topically 2 (two) times daily.    ID NOW COVID-19 TEST KIT Kit TEST AS DIRECTED TODAY    levocetirizine (XYZAL) 5 MG tablet Take 1 tablet (5 mg total) by mouth every evening.    LO LOESTRIN FE 1 mg-10 mcg (24)/10 mcg (2) Tab Take 1 tablet by mouth once daily.    meloxicam (MOBIC) 15 MG  tablet Take 15 mg by mouth once daily.    ocrelizumab (OCREVUS IV) Inject into the vein.    polyethylene glycol (GLYCOLAX) 17 gram/dose powder Take 17 g by mouth once daily. (Patient not taking: Reported on 7/10/2023)    sumatriptan (IMITREX) 50 MG tablet Take 1 tablet at onset of headache; may take 2nd tablet 2 hours later if needed, but not to exceed two tabs per day.    triamcinolone acetonide 0.1% (KENALOG) 0.1 % cream Apply to affected areas of body BID prn rash. Do not use on face, underarms, or groin.     No current facility-administered medications for this visit.       Objective:      Physical Exam  Constitutional:       General: She is not in acute distress.  HENT:      Head: Normocephalic.   Eyes:      Pupils: Pupils are equal, round, and reactive to light.   Neck:      Thyroid: No thyromegaly.      Vascular: No JVD.      Trachea: No tracheal deviation.   Cardiovascular:      Rate and Rhythm: Normal rate and regular rhythm.      Heart sounds: Normal heart sounds. No murmur heard.  Pulmonary:      Effort: Pulmonary effort is normal.      Breath sounds: Normal breath sounds. No stridor.   Abdominal:      Palpations: Abdomen is soft.       Lymphadenopathy:      Head:      Right side of head: No submental, submandibular, tonsillar, preauricular, posterior auricular or occipital adenopathy.      Left side of head: No submental, submandibular, tonsillar, preauricular, posterior auricular or occipital adenopathy.      Cervical: No cervical adenopathy.   Neurological:      Mental Status: She is alert. She is not disoriented.      Cranial Nerves: No cranial nerve deficit.      Sensory: No sensory deficit.           Assessment:       1. Liver lesion        Plan:   She has a very abnormal abdominal CT with findings suggestive of a liver malignancy. Her scan was reviewed by Dr Jimenes in radiology who feels that she has multifocal HCC.    This is very unusual in someone of her age and in someone who does not have  pre-existing liver disease.    - referred to oncology  - also referred to IR who will see for consultation and discuss a liver biopsy as well possible Y90 as locoregional therapy.  She is aware that we are very concerned about the possibility of cancer.    Labs incl tumor markers today

## 2023-08-08 NOTE — TELEPHONE ENCOUNTER
Please call pt regarding ? Y90. Pt states [facility]. Pt states nothing was discussed at today's visit regarding a Y90. ARISTEO KU

## 2023-08-08 NOTE — PROGRESS NOTES
History & Physical  Gynecology      SUBJECTIVE:     Chief Complaint: Advice Only       History of Present Illness:  Ms. Honeycutt is a 34 y/o female who presents to clinic as a follow up. She was seeing Dr. Anguiano for this issue last year and was prescribed Fibanserin. She reports that she feels that her decreased libido is connected to her MS diagnosis. The decreased libido started at this time. She reports that the last time she had intercourse was March. She denies hot flashes and night sweats. Her periods are normal and monthly.      Review of patient's allergies indicates:   Allergen Reactions    Lavender Rash       Past Medical History:   Diagnosis Date    Herniation of lumbar intervertebral disc     Hypercholesteremia     Multiple sclerosis 2021    Tachycardia      No past surgical history on file.  OB History          0    Para   0    Term   0       0    AB   0    Living   0         SAB   0    IAB   0    Ectopic   0    Multiple   0    Live Births               Obstetric Comments   Menarche age 13.   Menses absent on OCPs.  History of abnormal PAP smear: NO.  History of sexually transmitted disease:  NO                 Family History   Problem Relation Age of Onset    Hypertension Mother     Thyroid disease Mother     Hypertension Father     Hypertension Sister     Hyperlipidemia Sister     Heart disease Maternal Grandfather     Hypertension Paternal Grandmother     Melanoma Neg Hx     Breast cancer Neg Hx     Colon cancer Neg Hx     Ovarian cancer Neg Hx      Social History     Tobacco Use    Smoking status: Never    Smokeless tobacco: Never   Substance Use Topics    Alcohol use: Yes     Alcohol/week: 1.0 standard drink of alcohol     Types: 1 Glasses of wine per week    Drug use: No       Current Outpatient Medications   Medication Sig    hydrocortisone 2.5 % cream Apply topically 2 (two) times daily.    ID NOW COVID-19 TEST KIT Kit TEST AS DIRECTED TODAY    levocetirizine (XYZAL) 5 MG  tablet Take 1 tablet (5 mg total) by mouth every evening. (Patient not taking: Reported on 8/10/2023)    LO LOESTRIN FE 1 mg-10 mcg (24)/10 mcg (2) Tab Take 1 tablet by mouth once daily.    ocrelizumab (OCREVUS IV) Inject into the vein every 6 (six) months.    sumatriptan (IMITREX) 50 MG tablet Take 1 tablet at onset of headache; may take 2nd tablet 2 hours later if needed, but not to exceed two tabs per day.    triamcinolone acetonide 0.1% (KENALOG) 0.1 % cream Apply to affected areas of body BID prn rash. Do not use on face, underarms, or groin.    benzocaine-menthoL 6-10 mg lozenge Take 1 lozenge by mouth every 2 (two) hours as needed for Pain.     No current facility-administered medications for this visit.         Review of Systems:  Review of Systems   Constitutional:  Negative for chills and fever.   Eyes:  Negative for visual disturbance.   Respiratory:  Negative for cough and wheezing.    Cardiovascular:  Negative for chest pain and palpitations.   Gastrointestinal:  Negative for abdominal pain, nausea and vomiting.   Genitourinary:  Positive for decreased libido. Negative for dysuria, frequency, hematuria, pelvic pain, vaginal bleeding, vaginal discharge and vaginal pain.   Neurological:  Negative for headaches.   Psychiatric/Behavioral:  Negative for depression.         OBJECTIVE:     Physical Exam:  Physical Exam  Vitals and nursing note reviewed.   Constitutional:       Appearance: Normal appearance. She is well-developed.   Cardiovascular:      Rate and Rhythm: Normal rate.   Pulmonary:      Effort: Pulmonary effort is normal. No respiratory distress.   Abdominal:      General: There is no distension.      Palpations: Abdomen is soft.      Tenderness: There is no abdominal tenderness.   Genitourinary:     Exam position: Supine.   Skin:     General: Skin is warm and dry.   Neurological:      Mental Status: She is oriented to person, place, and time.           ASSESSMENT:       ICD-10-CM ICD-9-CM    1.  Decreased libido  R68.82 799.81 Follicle Stimulating Hormone      LUTEINIZING HORMONE      TSH      Progesterone      Estradiol      Testosterone, Free             Plan:      Jessica was seen today for advice only.    Diagnoses and all orders for this visit:    Decreased libido  -     Follicle Stimulating Hormone; Future  -     LUTEINIZING HORMONE; Future  -     TSH; Future  -     Progesterone; Future  -     Estradiol; Future  -     Testosterone, Free; Future  - Associated with the MS diagnosis. Declines medication. Hormones will be done  - Discussed therapy with patient as well. Pelvic floor PT and options as well        Orders Placed This Encounter   Procedures    Follicle Stimulating Hormone    LUTEINIZING HORMONE    TSH    Progesterone    Estradiol    Testosterone, Free       Follow up in about 1 year (around 8/8/2024) for Well Woman/Annual.    Counseling time: 30 minutes    Luana Valentine

## 2023-08-10 ENCOUNTER — PATIENT MESSAGE (OUTPATIENT)
Dept: OBSTETRICS AND GYNECOLOGY | Facility: CLINIC | Age: 33
End: 2023-08-10
Payer: COMMERCIAL

## 2023-08-10 ENCOUNTER — TELEPHONE (OUTPATIENT)
Dept: HEMATOLOGY/ONCOLOGY | Facility: CLINIC | Age: 33
End: 2023-08-10
Payer: COMMERCIAL

## 2023-08-10 ENCOUNTER — OFFICE VISIT (OUTPATIENT)
Dept: INTERVENTIONAL RADIOLOGY/VASCULAR | Facility: CLINIC | Age: 33
End: 2023-08-10
Payer: COMMERCIAL

## 2023-08-10 DIAGNOSIS — R16.0 LIVER MASSES: Primary | ICD-10-CM

## 2023-08-10 PROCEDURE — 1160F PR REVIEW ALL MEDS BY PRESCRIBER/CLIN PHARMACIST DOCUMENTED: ICD-10-PCS | Mod: CPTII,95,, | Performed by: FAMILY MEDICINE

## 2023-08-10 PROCEDURE — 1159F PR MEDICATION LIST DOCUMENTED IN MEDICAL RECORD: ICD-10-PCS | Mod: CPTII,95,, | Performed by: FAMILY MEDICINE

## 2023-08-10 PROCEDURE — 1160F RVW MEDS BY RX/DR IN RCRD: CPT | Mod: CPTII,95,, | Performed by: FAMILY MEDICINE

## 2023-08-10 PROCEDURE — 99204 PR OFFICE/OUTPT VISIT, NEW, LEVL IV, 45-59 MIN: ICD-10-PCS | Mod: 95,,, | Performed by: FAMILY MEDICINE

## 2023-08-10 PROCEDURE — 99204 OFFICE O/P NEW MOD 45 MIN: CPT | Mod: 95,,, | Performed by: FAMILY MEDICINE

## 2023-08-10 PROCEDURE — 1159F MED LIST DOCD IN RCRD: CPT | Mod: CPTII,95,, | Performed by: FAMILY MEDICINE

## 2023-08-10 PROCEDURE — 3044F HG A1C LEVEL LT 7.0%: CPT | Mod: 95,CPTII,, | Performed by: FAMILY MEDICINE

## 2023-08-10 PROCEDURE — 3044F PR MOST RECENT HEMOGLOBIN A1C LEVEL <7.0%: ICD-10-PCS | Mod: 95,CPTII,, | Performed by: FAMILY MEDICINE

## 2023-08-10 NOTE — Clinical Note
Thank you for referring Ms. Garcia to Interventional Radiology at the [facility]. Please don't hesitate to contact us if there are any questions regarding this evaluation at [phone].   Sincerely, JUVENAL Garcia, FNP Interventional Radiology

## 2023-08-10 NOTE — PROGRESS NOTES
"Subjective     Patient ID: Jessica Garcia is a 32 y.o. female.    Chief Complaint: Lesion    Virtual visit with patient referred to Interventional Radiology by Dr. Romano for evaluation of liver masses. Masses were identified during workup for abdominal pain. A CT scan obtained on 7/25/2023 noted "Prominent hepatomegaly with replacement of much of the liver parenchyma by heterogeneous tumor.  This may represent focal nodular hyperplasia or neoplasm." Today patient denies any abdominal pain, but admits to early satiety and abdominal distention. She has no known liver disease.     Review of Systems   Constitutional:  Positive for appetite change (decreased; early satiety). Negative for activity change, chills, fatigue and fever.   Respiratory:  Negative for cough, shortness of breath, wheezing and stridor.    Cardiovascular:  Negative for chest pain, palpitations and leg swelling.   Gastrointestinal:  Positive for abdominal distention. Negative for abdominal pain, constipation, diarrhea, nausea and vomiting.          Objective     Physical Exam  Constitutional:       General: She is not in acute distress.     Appearance: She is well-developed. She is not diaphoretic.   HENT:      Head: Normocephalic and atraumatic.   Pulmonary:      Effort: Pulmonary effort is normal. No respiratory distress.   Neurological:      Mental Status: She is alert and oriented to person, place, and time.   Psychiatric:         Behavior: Behavior normal.         Thought Content: Thought content normal.         Judgment: Judgment normal.     Reviewed  hepatology progress note    CT scan 7/25/2023       Assessment and Plan     1. Liver masses  -     IR Embolization for Tumor_Organ Ischemia; Future; Expected date: 08/10/2023  -     NM Liver Imaging Static PRE Y-90 Emboliz; Future; Expected date: 08/10/2023  -     NM Liver Imaging Static POST Y-90 Emboli; Future; Expected date: 08/10/2023  -     NM Spect/CT Single Area; Future; Expected " date: 08/10/2023  -     NM Spect/CT Single Area; Future; Expected date: 08/10/2023  -     IR Embolization for Tumor_Organ Ischemia; Future; Expected date: 08/10/2023  -     Comprehensive Metabolic Panel; Future; Expected date: 08/10/2023  -     Bilirubin, Direct; Future; Expected date: 08/10/2023  -     Protime-INR; Future; Expected date: 08/10/2023  -     CBC Auto Differential; Future; Expected date: 08/10/2023  -     IR Biopsy Liver; Future; Expected date: 08/10/2023        The patient location is: [place]  The chief complaint leading to consultation is: liver masses    Visit type: audiovisual    Face to Face time with patient: 20 minutes  25 minutes of total time spent on the encounter, which includes face to face time and non-face to face time preparing to see the patient (eg, review of tests), Obtaining and/or reviewing separately obtained history, Documenting clinical information in the electronic or other health record, Independently interpreting results (not separately reported) and communicating results to the patient/family/caregiver, or Care coordination (not separately reported).         Each patient to whom he or she provides medical services by telemedicine is:  (1) informed of the relationship between the physician and patient and the respective role of any other health care provider with respect to management of the patient; and (2) notified that he or she may decline to receive medical services by telemedicine and may withdraw from such care at any time.    Notes:   Discussed case with Dr. Jimenes. Explained to patient recommendation is to obtain biopsy and perform mapping procedure during same visit in anticipation of treating liver masses with radioembolization. Discussed how the biopsy will be performed, risks (including, but not limited to, pain, bleeding, infection, damage to nearby structures, and the need for additional procedures), benefits, possible complications, pre-post procedure  expectations, and alternatives.     Yttrium 90 Radioembolization discussed in detail with the patient including risks (including, but not limited to, pain, bleeding, infection, damage to nearby structures, and the need for additional procedures), benefits, potential complications, usual pre and post procedure course.  Discussed the need for initial Angiogram mapping study prior to scheduling the actual Radioembolization procedure.     The patient voices understanding and all questions have been answered. She tells me she would like to proceed only with the biopsy because if this is malignant she would like to be treated at [facility]. Reassured patient [facility] has a collaboration with [facility], but would not wait for biopsy to establish care there. Suggested she allow me to schedule her biopsy and mapping. Recommend she contact [facility] ASAP. Then when she obtains her appointments at [facility], she can cancel with us. Explained I am very concerned regarding the appearance of her imaging and in the interest of time, I would not wait for the biopsy results to establish care at [facility].     The patient agrees to proceed as discussed. Patient scheduled for biopsy and radioembolization mapping on 8/21/2023 with anesthesia per her request. Clinic phone number provided.

## 2023-08-11 ENCOUNTER — PATIENT MESSAGE (OUTPATIENT)
Dept: HEPATOLOGY | Facility: CLINIC | Age: 33
End: 2023-08-11
Payer: COMMERCIAL

## 2023-08-14 ENCOUNTER — TELEPHONE (OUTPATIENT)
Dept: HEPATOLOGY | Facility: CLINIC | Age: 33
End: 2023-08-14
Payer: COMMERCIAL

## 2023-08-14 ENCOUNTER — TELEPHONE (OUTPATIENT)
Dept: TRANSPLANT | Facility: HOSPITAL | Age: 33
End: 2023-08-14
Payer: COMMERCIAL

## 2023-08-14 ENCOUNTER — PATIENT MESSAGE (OUTPATIENT)
Dept: PSYCHIATRY | Facility: CLINIC | Age: 33
End: 2023-08-14
Payer: COMMERCIAL

## 2023-08-14 NOTE — TELEPHONE ENCOUNTER
Advised pt message sent to provider to address not wanting to do Y90 at this time and other options. ARISTEO KU

## 2023-08-14 NOTE — TELEPHONE ENCOUNTER
----- Message from Sherman Kirk sent at 8/14/2023 10:07 AM CDT -----  Regarding: call back  Pt call to speak with Dr Romano in regards to some question she has    Call

## 2023-08-14 NOTE — TELEPHONE ENCOUNTER
Patient: Jessica Garcia       MRN: [id number]      : 1990     Age: 32 y.o.  [address]  [facility] [address]      Providers: Bartolo Romano MD    Priority of review: Cancer    Patient Transplant Status: Not a candidate    Reason for presentation: Initial staging for transplant    Clinical Summary: 32 year old with no history of liver disease who presents with a 20 lb weight loss, palpable hepatomegaly, early satiety    Past hx of Multiple sclerosis    Very abnormal imaging- multifocal HCC  Normal AFP/Ca 19 19    Booked for oncology/and liver biopsy with Y90 mapping    Imaging to be reviewed: CT abdo 23    HCC Treatment History: none    Platelets:   Lab Results   Component Value Date/Time     2023 10:18 AM     Creatinine:   Lab Results   Component Value Date/Time    CREATININE 0.8 2023 10:18 AM     Bilirubin:   Lab Results   Component Value Date/Time    BILITOT 1.2 (H) 2023 10:18 AM     AFP Last 3 each if available:   Lab Results   Component Value Date/Time    AFP <2.0 2023 10:18 AM       MELD: MELD 3.0: 10 at 2023 10:18 AM  MELD-Na: 9 at 2023 10:18 AM  Calculated from:  Serum Creatinine: 0.8 mg/dL (Using min of 1 mg/dL) at 2023 10:18 AM  Serum Sodium: 139 mmol/L (Using max of 137 mmol/L) at 2023 10:18 AM  Total Bilirubin: 1.2 mg/dL at 2023 10:18 AM  Serum Albumin: 3.8 g/dL (Using max of 3.5 g/dL) at 2023 10:18 AM  INR(ratio): 1.2 at 2023 10:18 AM  Age at listing (hypothetical): 32 years  Sex: Female at 2023 10:18 AM      Plan:     Follow-up Provider:

## 2023-08-15 ENCOUNTER — OFFICE VISIT (OUTPATIENT)
Dept: HEMATOLOGY/ONCOLOGY | Facility: CLINIC | Age: 33
End: 2023-08-15
Payer: COMMERCIAL

## 2023-08-15 VITALS
OXYGEN SATURATION: 97 % | BODY MASS INDEX: 25.44 KG/M2 | DIASTOLIC BLOOD PRESSURE: 70 MMHG | WEIGHT: 152.69 LBS | HEART RATE: 106 BPM | TEMPERATURE: 98 F | HEIGHT: 65 IN | SYSTOLIC BLOOD PRESSURE: 116 MMHG | RESPIRATION RATE: 18 BRPM

## 2023-08-15 VITALS — WEIGHT: 160 LBS | BODY MASS INDEX: 26.66 KG/M2 | HEIGHT: 65 IN

## 2023-08-15 DIAGNOSIS — G35 MULTIPLE SCLEROSIS: ICD-10-CM

## 2023-08-15 DIAGNOSIS — K76.9 LIVER LESION: Primary | ICD-10-CM

## 2023-08-15 DIAGNOSIS — G43.009 MIGRAINE WITHOUT AURA AND WITHOUT STATUS MIGRAINOSUS, NOT INTRACTABLE: ICD-10-CM

## 2023-08-15 PROCEDURE — 3044F PR MOST RECENT HEMOGLOBIN A1C LEVEL <7.0%: ICD-10-PCS | Mod: CPTII,S$GLB,, | Performed by: INTERNAL MEDICINE

## 2023-08-15 PROCEDURE — 1159F MED LIST DOCD IN RCRD: CPT | Mod: CPTII,S$GLB,, | Performed by: INTERNAL MEDICINE

## 2023-08-15 PROCEDURE — 3008F BODY MASS INDEX DOCD: CPT | Mod: CPTII,S$GLB,, | Performed by: INTERNAL MEDICINE

## 2023-08-15 PROCEDURE — 1159F PR MEDICATION LIST DOCUMENTED IN MEDICAL RECORD: ICD-10-PCS | Mod: CPTII,S$GLB,, | Performed by: INTERNAL MEDICINE

## 2023-08-15 PROCEDURE — 3078F DIAST BP <80 MM HG: CPT | Mod: CPTII,S$GLB,, | Performed by: INTERNAL MEDICINE

## 2023-08-15 PROCEDURE — 3044F HG A1C LEVEL LT 7.0%: CPT | Mod: CPTII,S$GLB,, | Performed by: INTERNAL MEDICINE

## 2023-08-15 PROCEDURE — 3078F PR MOST RECENT DIASTOLIC BLOOD PRESSURE < 80 MM HG: ICD-10-PCS | Mod: CPTII,S$GLB,, | Performed by: INTERNAL MEDICINE

## 2023-08-15 PROCEDURE — 99999 PR PBB SHADOW E&M-EST. PATIENT-LVL IV: CPT | Mod: PBBFAC,,, | Performed by: INTERNAL MEDICINE

## 2023-08-15 PROCEDURE — 3074F SYST BP LT 130 MM HG: CPT | Mod: CPTII,S$GLB,, | Performed by: INTERNAL MEDICINE

## 2023-08-15 PROCEDURE — 99999 PR PBB SHADOW E&M-EST. PATIENT-LVL IV: ICD-10-PCS | Mod: PBBFAC,,, | Performed by: INTERNAL MEDICINE

## 2023-08-15 PROCEDURE — 3008F PR BODY MASS INDEX (BMI) DOCUMENTED: ICD-10-PCS | Mod: CPTII,S$GLB,, | Performed by: INTERNAL MEDICINE

## 2023-08-15 PROCEDURE — 99205 PR OFFICE/OUTPT VISIT, NEW, LEVL V, 60-74 MIN: ICD-10-PCS | Mod: S$GLB,,, | Performed by: INTERNAL MEDICINE

## 2023-08-15 PROCEDURE — 3074F PR MOST RECENT SYSTOLIC BLOOD PRESSURE < 130 MM HG: ICD-10-PCS | Mod: CPTII,S$GLB,, | Performed by: INTERNAL MEDICINE

## 2023-08-15 PROCEDURE — 99205 OFFICE O/P NEW HI 60 MIN: CPT | Mod: S$GLB,,, | Performed by: INTERNAL MEDICINE

## 2023-08-15 NOTE — PROGRESS NOTES
"Subjective:       Patient ID: Jessica Garcia is a 32 y.o. female.    Chief Complaint: Follow-up    Follow-up  Associated symptoms include abdominal pain. Pertinent negatives include no arthralgias, chest pain, chills, coughing, fatigue, headaches, joint swelling, neck pain, vomiting or weakness.     Pt is in virtual visit for follow up of liver tumor noted on imaging.  Pt denies abd pain no change in bowel habits no skin eye color changes  Pt denies weight loss pt has had elevated glucose denies polys   Review of Systems   Constitutional:  Negative for activity change, chills and fatigue.   HENT:  Negative for hearing loss, rhinorrhea and trouble swallowing.    Eyes:  Negative for discharge and visual disturbance.   Respiratory:  Negative for cough, chest tightness, shortness of breath and wheezing.    Cardiovascular:  Negative for chest pain and palpitations.   Gastrointestinal:  Positive for abdominal pain. Negative for abdominal distention, blood in stool, constipation, diarrhea and vomiting.   Endocrine: Negative for polydipsia and polyuria.   Genitourinary:  Negative for difficulty urinating, dysuria, hematuria and menstrual problem.   Musculoskeletal:  Negative for arthralgias, joint swelling and neck pain.   Neurological:  Negative for weakness and headaches.   Psychiatric/Behavioral:  Negative for confusion and dysphoric mood.        Objective:      Ht 5' 5" (1.651 m)   Wt 72.6 kg (160 lb)   BMI 26.63 kg/m²     Physical Exam  Constitutional:       Appearance: Normal appearance. She is not ill-appearing.   HENT:      Head: Normocephalic and atraumatic.   Eyes:      Extraocular Movements: Extraocular movements intact.   Pulmonary:      Effort: Pulmonary effort is normal. No respiratory distress.   Neurological:      General: No focal deficit present.      Mental Status: She is alert and oriented to person, place, and time.      Cranial Nerves: No cranial nerve deficit.      Coordination: Coordination " "normal.   Psychiatric:         Mood and Affect: Mood normal.         Behavior: Behavior normal.         Thought Content: Thought content normal.         Judgment: Judgment normal.       Abd ct scan 7/2023 - Heterogenous liver mass   Assessment:       1. Liver tumor    2. Hyperglycemia        Plan:     Orders lft  F/u liver clinic   Rtc 1 month    "This note will not be shared with the patient."     "

## 2023-08-15 NOTE — PROGRESS NOTES
MEDICAL ONCOLOGY - NEW PATIENT VISIT    Reason for visit: Liver mass    Best Contact Phone Number(s): [phone] (home)      Cancer/Stage/TNM:    Cancer Staging   No matching staging information was found for the patient.     Oncology History    No history exists.        HPI:   32 y.o. female with MS on ocrelizumab, prior migraines who presents for further evaluation of a large liver mass seen on CT.  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  No known underlying liver disease.  No HCV, HBV, heavy EtOH use.  No known fatty liver disease. She has been on OCP for 15 years but has been off her OCP for 3 weeks now since finding out about this liver mass.  She had her imaging because of feeling firmness in her RUQ.  She denies pain, weight loss, distension, bleeding.  Grandmother had breast cancer - no other known family history of cancer.    History has been obtained by chart review and discussion with the patient.    ROS:   Review of Systems   Constitutional:  Negative for chills, fever, malaise/fatigue and weight loss.   HENT:  Negative for sore throat.    Eyes:  Negative for blurred vision and pain.   Respiratory:  Negative for cough and shortness of breath.    Cardiovascular:  Negative for chest pain and leg swelling.   Gastrointestinal:  Negative for abdominal pain, constipation, diarrhea, nausea and vomiting.   Genitourinary:  Negative for dysuria and frequency.   Musculoskeletal:  Negative for back pain, falls and myalgias.   Skin:  Negative for rash.   Neurological:  Negative for dizziness, weakness and headaches.   Endo/Heme/Allergies:  Does not bruise/bleed easily.   Psychiatric/Behavioral:  Negative for depression. The patient is not nervous/anxious.        Past Medical History:   Past Medical History:   Diagnosis Date    Herniation of lumbar intervertebral disc     Hypercholesteremia     Multiple sclerosis 01/05/2021    Tachycardia         Past Surgical  History:   No past surgical history on file.     Family History:   Family History   Problem Relation Age of Onset    Hypertension Mother     Thyroid disease Mother     Hypertension Father     Hypertension Sister     Hyperlipidemia Sister     Heart disease Maternal Grandfather     Hypertension Paternal Grandmother     Melanoma Neg Hx     Breast cancer Neg Hx     Colon cancer Neg Hx     Ovarian cancer Neg Hx         Social History:   Social History     Tobacco Use    Smoking status: Never    Smokeless tobacco: Never   Substance Use Topics    Alcohol use: Yes     Alcohol/week: 1.0 standard drink of alcohol     Types: 1 Glasses of wine per week        I have reviewed and updated the patient's past medical, surgical, family and social histories.    Allergies:   Review of patient's allergies indicates:   Allergen Reactions    Lavender Rash        Medications:   Current Outpatient Medications   Medication Sig Dispense Refill    gabapentin (NEURONTIN) 600 MG tablet Take 600 mg by mouth every evening.      hydrocortisone 2.5 % cream Apply topically 2 (two) times daily. 28 g 1    ID NOW COVID-19 TEST KIT Kit TEST AS DIRECTED TODAY      levocetirizine (XYZAL) 5 MG tablet Take 1 tablet (5 mg total) by mouth every evening. (Patient not taking: Reported on 8/10/2023) 30 tablet 3    LO LOESTRIN FE 1 mg-10 mcg (24)/10 mcg (2) Tab Take 1 tablet by mouth once daily. 84 tablet 1    meloxicam (MOBIC) 15 MG tablet Take 15 mg by mouth once daily.      ocrelizumab (OCREVUS IV) Inject into the vein.      polyethylene glycol (GLYCOLAX) 17 gram/dose powder Take 17 g by mouth once daily. (Patient not taking: Reported on 7/10/2023) 235 g 0    sumatriptan (IMITREX) 50 MG tablet Take 1 tablet at onset of headache; may take 2nd tablet 2 hours later if needed, but not to exceed two tabs per day. 9 tablet 0    triamcinolone acetonide 0.1% (KENALOG) 0.1 % cream Apply to affected areas of body BID prn rash. Do not use on face, underarms, or groin. 80  "g 3     No current facility-administered medications for this visit.        Physical Exam:   /70 (BP Location: Left arm, Patient Position: Sitting, BP Method: Medium (Automatic))   Pulse 106   Temp 97.7 °F (36.5 °C) (Oral)   Resp 18   Ht 5' 5" (1.651 m)   Wt 69.3 kg (152 lb 10.7 oz)   SpO2 97%   BMI 25.41 kg/m²      ECOG Performance status: 0            Physical Exam  Vitals reviewed.   Constitutional:       General: She is not in acute distress.     Appearance: Normal appearance. She is normal weight.   HENT:      Head: Normocephalic and atraumatic.      Right Ear: External ear normal.      Left Ear: External ear normal.      Nose: Nose normal.      Mouth/Throat:      Mouth: Mucous membranes are moist.      Pharynx: Oropharynx is clear. No posterior oropharyngeal erythema.   Eyes:      General: No scleral icterus.     Extraocular Movements: Extraocular movements intact.      Conjunctiva/sclera: Conjunctivae normal.      Pupils: Pupils are equal, round, and reactive to light.   Cardiovascular:      Rate and Rhythm: Normal rate and regular rhythm.      Pulses: Normal pulses.      Heart sounds: Normal heart sounds.   Pulmonary:      Effort: Pulmonary effort is normal.      Breath sounds: Normal breath sounds. No wheezing or rales.   Abdominal:      General: Bowel sounds are normal. There is no distension.      Palpations: Abdomen is soft.      Tenderness: There is no abdominal tenderness.      Comments: Firm hepatomegaly   Musculoskeletal:         General: No swelling. Normal range of motion.      Cervical back: Normal range of motion and neck supple.   Skin:     General: Skin is warm.      Coloration: Skin is not jaundiced.      Findings: No erythema or rash.   Neurological:      General: No focal deficit present.      Mental Status: She is alert and oriented to person, place, and time. Mental status is at baseline.      Gait: Gait normal.   Psychiatric:         Mood and Affect: Mood normal.         " Behavior: Behavior normal.         Thought Content: Thought content normal.         Judgment: Judgment normal.           Labs:   No results found for this or any previous visit (from the past 48 hour(s)).     I have reviewed the pertinent labs from 8/8/23 which are notable for normal blood counts, mild bilirubin elevation.  AFP < 2, CA 19-9 26.8.    Imaging:       I have personally reviewed the 7/25/23 CT A/P imaging which is notable for large, heterogenous R hepatic lobe mass with hepatomegaly.  No clear extrahepatic lesions/masses.      Assessment:       1. Liver lesion    2. Multiple sclerosis    3. Migraine without aura and without status migrainosus, not intractable          Plan:             # Liver mass  Noted on imaging from 7/25/23.  She has a large, heterogenous, infiltrative mass centered in the R hepatic lobe.  We discussed her case at liver conference 8/15/23 with concern for possible infiltrative HCC.  Obviously this is unusual in the case of her lack of clear underlying liver disease.  Raises concern for atypical variant such as fibrolamellar HCC.  Alternative diagnoses include atypical FNH, large atypical adenoma associated with her OCP vs other malignancy.  I discussed all of these possibilities with her and the implications of potential diagnoses.    She will undergo biopsy and Y-90 mapping on 8/21/23 with IR.  I will see her back the following week to discuss her pathology results and help formulate a treatment plan.  If she does have HCC I will have to discuss with her neurologist the safety/risks of immune checkpoint inhibition.    # MS  Follows with Dr. Arroyo.  Taking ocrelizumab.    # Migraines  Mostly resolved at this time.    Follow up: 10 days.     The above information has been reviewed with the patient and all questions have been answered to their apparent satisfaction.  They understand that they can call the clinic with any questions.    Moe Knutson MD  Hematology/Oncology  [facility]    Route Chart for Scheduling    Med Onc Chart Routing      Follow up with physician 2 weeks. see me on 8/30 in afternoon   Follow up with MARU    Infusion scheduling note    Injection scheduling note    Labs    Imaging    Pharmacy appointment    Other referrals                Therapy Plan Information  Pre-Medications  acetaminophen tablet 1,000 mg  1,000 mg, Oral, Every visit  diphenhydrAMINE (BENADRYL) 50 mg in NS 50 mL IVPB  50 mg, Intravenous, Every visit  famotidine (PF) injection 20 mg  20 mg, Intravenous, Every visit  methylPREDNISolone sodium succinate (SOLU-MEDROL) 100 mg in dextrose 5 % (D5W) 100 mL IVPB  100 mg, Intravenous, Every visit  Flushes  sodium chloride 0.9% flush 10 mL  10 mL, Intravenous, Every visit  heparin, porcine (PF) 100 unit/mL injection flush 500 Units  500 Units, Intravenous, Every visit  alteplase injection 2 mg  2 mg, Intra-Catheter, Every visit  sodium chloride 0.9% 250 mL flush bag  Intravenous, Every visit  PRN Medications  EPINEPHrine (EPIPEN) 0.3 mg/0.3 mL pen injection 0.3 mg  0.3 mg, Intramuscular, Every visit  diphenhydrAMINE injection 50 mg  50 mg, Intravenous, Every visit  hydrocortisone sodium succinate injection 100 mg  100 mg, Intravenous, Every visit

## 2023-08-16 ENCOUNTER — TELEPHONE (OUTPATIENT)
Dept: INTERVENTIONAL RADIOLOGY/VASCULAR | Facility: HOSPITAL | Age: 33
End: 2023-08-16
Payer: COMMERCIAL

## 2023-08-16 NOTE — TELEPHONE ENCOUNTER
Returned patient's phone call. She asks if her biopsy results were benign, would there be any harm in the mapping. Reassured patient the mapping is simply an angiogram. She asks if a vessel was coiled, were there any repercussions. Reassured if coiling of a vessel occurs during mapping, organs have such a great supply of blood from other vessels, that it is inconsequential.     She also asks what her recovery would be. Reminded patient due to femoral access for mapping, we would advise no driving for 3 days and no heavy lifting or strenuous exercise for 10 days. Defined heavy lifting as anything heavier that a gallon of milk. Patient verbalized understanding and agreement. No further questions at this time.

## 2023-08-18 ENCOUNTER — PATIENT MESSAGE (OUTPATIENT)
Dept: INTERVENTIONAL RADIOLOGY/VASCULAR | Facility: HOSPITAL | Age: 33
End: 2023-08-18
Payer: COMMERCIAL

## 2023-08-18 ENCOUNTER — DOCUMENTATION ONLY (OUTPATIENT)
Dept: PREADMISSION TESTING | Facility: HOSPITAL | Age: 33
End: 2023-08-18
Payer: COMMERCIAL

## 2023-08-18 NOTE — PRE-PROCEDURE INSTRUCTIONS
PRE-OP INSTRUCTIONS:  Instructed patient to have no food,milk or milk products after midnight   It is ok to take AM medications with a few sips of water   Medication instructions for pm prior to and am of surgery reviewed.  Instructed patient to avoid taking vitamins,supplements,aspirin or ibuprofen the am of surgery.  Shower instructions provided    Patient denies any familial side effects or issues with anesthesia or sedation.   THIS WILL BE THE PATIENT'S FIRST ANESTHESIA

## 2023-08-21 ENCOUNTER — HOSPITAL ENCOUNTER (OUTPATIENT)
Dept: RADIOLOGY | Facility: HOSPITAL | Age: 33
Discharge: HOME | End: 2023-08-21
Attending: FAMILY MEDICINE
Payer: COMMERCIAL

## 2023-08-21 ENCOUNTER — ANESTHESIA (OUTPATIENT)
Dept: INTERVENTIONAL RADIOLOGY/VASCULAR | Facility: HOSPITAL | Age: 33
End: 2023-08-21
Payer: COMMERCIAL

## 2023-08-21 ENCOUNTER — HOSPITAL ENCOUNTER (OUTPATIENT)
Dept: INTERVENTIONAL RADIOLOGY/VASCULAR | Facility: HOSPITAL | Age: 33
Discharge: HOME | End: 2023-08-21
Attending: FAMILY MEDICINE
Payer: COMMERCIAL

## 2023-08-21 VITALS
RESPIRATION RATE: 17 BRPM | DIASTOLIC BLOOD PRESSURE: 66 MMHG | OXYGEN SATURATION: 97 % | HEART RATE: 85 BPM | TEMPERATURE: 98 F | SYSTOLIC BLOOD PRESSURE: 116 MMHG

## 2023-08-21 DIAGNOSIS — R16.0 LIVER MASS: ICD-10-CM

## 2023-08-21 DIAGNOSIS — R16.0 LIVER MASSES: ICD-10-CM

## 2023-08-21 LAB
B-HCG UR QL: NEGATIVE
CTP QC/QA: YES

## 2023-08-21 PROCEDURE — 75774 ARTERY X-RAY EACH VESSEL: CPT | Mod: TC,59 | Performed by: PSYCHIATRY & NEUROLOGY

## 2023-08-21 PROCEDURE — D9220A PRA ANESTHESIA: Mod: ANES,,, | Performed by: ANESTHESIOLOGY

## 2023-08-21 PROCEDURE — 77290 THER RAD SIMULAJ FIELD CPLX: CPT | Mod: TC | Performed by: STUDENT IN AN ORGANIZED HEALTH CARE EDUCATION/TRAINING PROGRAM

## 2023-08-21 PROCEDURE — 88341 IMHCHEM/IMCYTCHM EA ADD ANTB: CPT | Mod: 26,59,, | Performed by: PATHOLOGY

## 2023-08-21 PROCEDURE — 37000009 HC ANESTHESIA EA ADD 15 MINS

## 2023-08-21 PROCEDURE — D9220A PRA ANESTHESIA: ICD-10-PCS | Mod: ANES,,, | Performed by: ANESTHESIOLOGY

## 2023-08-21 PROCEDURE — 47000 NEEDLE BIOPSY OF LIVER PERQ: CPT | Mod: ,,, | Performed by: STUDENT IN AN ORGANIZED HEALTH CARE EDUCATION/TRAINING PROGRAM

## 2023-08-21 PROCEDURE — 75726 ARTERY X-RAYS ABDOMEN: CPT | Mod: TC | Performed by: PSYCHIATRY & NEUROLOGY

## 2023-08-21 PROCEDURE — D9220A PRA ANESTHESIA: ICD-10-PCS | Mod: CRNA,,, | Performed by: STUDENT IN AN ORGANIZED HEALTH CARE EDUCATION/TRAINING PROGRAM

## 2023-08-21 PROCEDURE — 36248 INS CATH ABD/L-EXT ART ADDL: CPT | Mod: 59,,, | Performed by: STUDENT IN AN ORGANIZED HEALTH CARE EDUCATION/TRAINING PROGRAM

## 2023-08-21 PROCEDURE — 76942 ECHO GUIDE FOR BIOPSY: CPT | Mod: TC,59 | Performed by: STUDENT IN AN ORGANIZED HEALTH CARE EDUCATION/TRAINING PROGRAM

## 2023-08-21 PROCEDURE — 37000008 HC ANESTHESIA 1ST 15 MINUTES

## 2023-08-21 PROCEDURE — 75726 CHG ANGIO VISCERAL SELECTV/SUBSELEC: ICD-10-PCS | Mod: 26,,, | Performed by: STUDENT IN AN ORGANIZED HEALTH CARE EDUCATION/TRAINING PROGRAM

## 2023-08-21 PROCEDURE — 88333 PATH CONSLTJ SURG CYTO XM 1: CPT | Mod: 26,,, | Performed by: PATHOLOGY

## 2023-08-21 PROCEDURE — 36247 IR BIOPSY LIVER: ICD-10-PCS | Mod: 51,RT,, | Performed by: STUDENT IN AN ORGANIZED HEALTH CARE EDUCATION/TRAINING PROGRAM

## 2023-08-21 PROCEDURE — 76380 PR  CT SCAN,LIMITED/LOCALIZED F/U STUDY: ICD-10-PCS | Mod: 26,,, | Performed by: STUDENT IN AN ORGANIZED HEALTH CARE EDUCATION/TRAINING PROGRAM

## 2023-08-21 PROCEDURE — 78201 NM LIVER IMAGING STATIC PRE Y-90 EMBOLIZATION: ICD-10-PCS | Mod: 26,59,, | Performed by: STUDENT IN AN ORGANIZED HEALTH CARE EDUCATION/TRAINING PROGRAM

## 2023-08-21 PROCEDURE — 88307 TISSUE EXAM BY PATHOLOGIST: CPT | Performed by: PATHOLOGY

## 2023-08-21 PROCEDURE — 76937 US GUIDE VASCULAR ACCESS: CPT | Mod: 26,,, | Performed by: STUDENT IN AN ORGANIZED HEALTH CARE EDUCATION/TRAINING PROGRAM

## 2023-08-21 PROCEDURE — 76942 ECHO GUIDE FOR BIOPSY: CPT | Mod: 26,59,, | Performed by: STUDENT IN AN ORGANIZED HEALTH CARE EDUCATION/TRAINING PROGRAM

## 2023-08-21 PROCEDURE — D9220A PRA ANESTHESIA: Mod: CRNA,,, | Performed by: STUDENT IN AN ORGANIZED HEALTH CARE EDUCATION/TRAINING PROGRAM

## 2023-08-21 PROCEDURE — 77290 IR BIOPSY LIVER: ICD-10-PCS | Mod: 26,,, | Performed by: STUDENT IN AN ORGANIZED HEALTH CARE EDUCATION/TRAINING PROGRAM

## 2023-08-21 PROCEDURE — 88360 PR  TUMOR IMMUNOHISTOCHEM/MANUAL: ICD-10-PCS | Mod: 26,,, | Performed by: PATHOLOGY

## 2023-08-21 PROCEDURE — 75774 PR  ANGIO EA ADDNL SELECTV VESSEL: ICD-10-PCS | Mod: 26,59,, | Performed by: STUDENT IN AN ORGANIZED HEALTH CARE EDUCATION/TRAINING PROGRAM

## 2023-08-21 PROCEDURE — 75887 VEIN X-RAY LIVER W/O HEMODYN: CPT | Mod: TC | Performed by: PSYCHIATRY & NEUROLOGY

## 2023-08-21 PROCEDURE — 75887 IR EMBOLIZATION COMP FOR TUMOR_ORGAN ISCHEMIA_INFARC: ICD-10-PCS | Mod: 26,,, | Performed by: STUDENT IN AN ORGANIZED HEALTH CARE EDUCATION/TRAINING PROGRAM

## 2023-08-21 PROCEDURE — 76937 PR  US GUIDE, VASCULAR ACCESS: ICD-10-PCS | Mod: 26,,, | Performed by: STUDENT IN AN ORGANIZED HEALTH CARE EDUCATION/TRAINING PROGRAM

## 2023-08-21 PROCEDURE — 36247 INS CATH ABD/L-EXT ART 3RD: CPT | Mod: 51,RT,, | Performed by: STUDENT IN AN ORGANIZED HEALTH CARE EDUCATION/TRAINING PROGRAM

## 2023-08-21 PROCEDURE — 36248 INS CATH ABD/L-EXT ART ADDL: CPT | Mod: 59 | Performed by: PSYCHIATRY & NEUROLOGY

## 2023-08-21 PROCEDURE — 88360 TUMOR IMMUNOHISTOCHEM/MANUAL: CPT | Mod: 59 | Performed by: PATHOLOGY

## 2023-08-21 PROCEDURE — 81025 URINE PREGNANCY TEST: CPT | Performed by: FAMILY MEDICINE

## 2023-08-21 PROCEDURE — 25000003 PHARM REV CODE 250: Performed by: STUDENT IN AN ORGANIZED HEALTH CARE EDUCATION/TRAINING PROGRAM

## 2023-08-21 PROCEDURE — 75726 ARTERY X-RAYS ABDOMEN: CPT | Mod: 26,,, | Performed by: STUDENT IN AN ORGANIZED HEALTH CARE EDUCATION/TRAINING PROGRAM

## 2023-08-21 PROCEDURE — C1769 GUIDE WIRE: HCPCS

## 2023-08-21 PROCEDURE — 75887 VEIN X-RAY LIVER W/O HEMODYN: CPT | Mod: 26,,, | Performed by: STUDENT IN AN ORGANIZED HEALTH CARE EDUCATION/TRAINING PROGRAM

## 2023-08-21 PROCEDURE — 88333 PR  INTRAOPERATIVE CYTO PATH CONSULT, INITIAL SITE: ICD-10-PCS | Mod: 26,,, | Performed by: PATHOLOGY

## 2023-08-21 PROCEDURE — 88307 PR  SURG PATH,LEVEL V: ICD-10-PCS | Mod: 26,,, | Performed by: PATHOLOGY

## 2023-08-21 PROCEDURE — 76377 PR  3D RENDERING W/ IMAGE POSTPROCESS: ICD-10-PCS | Mod: 26,59,, | Performed by: STUDENT IN AN ORGANIZED HEALTH CARE EDUCATION/TRAINING PROGRAM

## 2023-08-21 PROCEDURE — 47000 NEEDLE BIOPSY OF LIVER PERQ: CPT | Performed by: STUDENT IN AN ORGANIZED HEALTH CARE EDUCATION/TRAINING PROGRAM

## 2023-08-21 PROCEDURE — 78201 LIVER IMAGING STATIC ONLY: CPT | Mod: 26,59,, | Performed by: STUDENT IN AN ORGANIZED HEALTH CARE EDUCATION/TRAINING PROGRAM

## 2023-08-21 PROCEDURE — 47000 NEEDLE BIOPSY OF LIVER PERQ: CPT | Mod: RT

## 2023-08-21 PROCEDURE — 77290 THER RAD SIMULAJ FIELD CPLX: CPT | Mod: 26,,, | Performed by: STUDENT IN AN ORGANIZED HEALTH CARE EDUCATION/TRAINING PROGRAM

## 2023-08-21 PROCEDURE — 76942 ECHO GUIDE FOR BIOPSY: CPT | Mod: TC

## 2023-08-21 PROCEDURE — 88341 PR IHC OR ICC EACH ADD'L SINGLE ANTIBODY  STAINPR: ICD-10-PCS | Mod: 26,59,, | Performed by: PATHOLOGY

## 2023-08-21 PROCEDURE — 78201 LIVER IMAGING STATIC ONLY: CPT | Mod: TC

## 2023-08-21 PROCEDURE — 76937 US GUIDE VASCULAR ACCESS: CPT | Mod: TC | Performed by: PSYCHIATRY & NEUROLOGY

## 2023-08-21 PROCEDURE — 88341 IMHCHEM/IMCYTCHM EA ADD ANTB: CPT | Performed by: PATHOLOGY

## 2023-08-21 PROCEDURE — 47000 PR NEEDLE BIOPSY LIVER: ICD-10-PCS | Mod: ,,, | Performed by: STUDENT IN AN ORGANIZED HEALTH CARE EDUCATION/TRAINING PROGRAM

## 2023-08-21 PROCEDURE — 37243 VASC EMBOLIZE/OCCLUDE ORGAN: CPT

## 2023-08-21 PROCEDURE — G0269 OCCLUSIVE DEVICE IN VEIN ART: HCPCS | Performed by: PSYCHIATRY & NEUROLOGY

## 2023-08-21 PROCEDURE — 88342 IMHCHEM/IMCYTCHM 1ST ANTB: CPT | Mod: 26,59,, | Performed by: PATHOLOGY

## 2023-08-21 PROCEDURE — 76942 PR U/S GUIDANCE FOR NEEDLE GUIDANCE: ICD-10-PCS | Mod: 26,59,, | Performed by: STUDENT IN AN ORGANIZED HEALTH CARE EDUCATION/TRAINING PROGRAM

## 2023-08-21 PROCEDURE — 78830 RP LOCLZJ TUM SPECT W/CT 1: CPT | Mod: 26,,, | Performed by: STUDENT IN AN ORGANIZED HEALTH CARE EDUCATION/TRAINING PROGRAM

## 2023-08-21 PROCEDURE — 36248 PR PR INS CATH ABD/L-EXT ART ADDL 2ND ORD/3RD ORD/BYD: ICD-10-PCS | Mod: 59,,, | Performed by: STUDENT IN AN ORGANIZED HEALTH CARE EDUCATION/TRAINING PROGRAM

## 2023-08-21 PROCEDURE — 88342 CHG IMMUNOCYTOCHEMISTRY: ICD-10-PCS | Mod: 26,59,, | Performed by: PATHOLOGY

## 2023-08-21 PROCEDURE — 76380 CAT SCAN FOLLOW-UP STUDY: CPT | Mod: 59,TC | Performed by: PSYCHIATRY & NEUROLOGY

## 2023-08-21 PROCEDURE — 78830 NM SPECT/CT SINGLE AREA: ICD-10-PCS | Mod: 26,,, | Performed by: STUDENT IN AN ORGANIZED HEALTH CARE EDUCATION/TRAINING PROGRAM

## 2023-08-21 PROCEDURE — 88307 TISSUE EXAM BY PATHOLOGIST: CPT | Mod: 26,,, | Performed by: PATHOLOGY

## 2023-08-21 PROCEDURE — 76377 3D RENDER W/INTRP POSTPROCES: CPT | Mod: 26,59,, | Performed by: STUDENT IN AN ORGANIZED HEALTH CARE EDUCATION/TRAINING PROGRAM

## 2023-08-21 PROCEDURE — 25500020 PHARM REV CODE 255: Performed by: ANESTHESIOLOGY

## 2023-08-21 PROCEDURE — 76377 3D RENDER W/INTRP POSTPROCES: CPT | Mod: TC,59 | Performed by: PSYCHIATRY & NEUROLOGY

## 2023-08-21 PROCEDURE — 63600175 PHARM REV CODE 636 W HCPCS: Performed by: STUDENT IN AN ORGANIZED HEALTH CARE EDUCATION/TRAINING PROGRAM

## 2023-08-21 PROCEDURE — 88342 IMHCHEM/IMCYTCHM 1ST ANTB: CPT | Performed by: PATHOLOGY

## 2023-08-21 PROCEDURE — 76380 CAT SCAN FOLLOW-UP STUDY: CPT | Mod: 26,,, | Performed by: STUDENT IN AN ORGANIZED HEALTH CARE EDUCATION/TRAINING PROGRAM

## 2023-08-21 PROCEDURE — 88360 TUMOR IMMUNOHISTOCHEM/MANUAL: CPT | Mod: 26,,, | Performed by: PATHOLOGY

## 2023-08-21 PROCEDURE — 36247 INS CATH ABD/L-EXT ART 3RD: CPT | Mod: RT | Performed by: PSYCHIATRY & NEUROLOGY

## 2023-08-21 PROCEDURE — 75774 ARTERY X-RAY EACH VESSEL: CPT | Mod: 26,59,, | Performed by: STUDENT IN AN ORGANIZED HEALTH CARE EDUCATION/TRAINING PROGRAM

## 2023-08-21 PROCEDURE — 78830 RP LOCLZJ TUM SPECT W/CT 1: CPT | Mod: TC

## 2023-08-21 PROCEDURE — 88333 PATH CONSLTJ SURG CYTO XM 1: CPT | Performed by: PATHOLOGY

## 2023-08-21 RX ORDER — FENTANYL CITRATE 50 UG/ML
25 INJECTION, SOLUTION INTRAMUSCULAR; INTRAVENOUS EVERY 5 MIN PRN
Status: DISCONTINUED | OUTPATIENT
Start: 2023-08-21 | End: 2023-08-22 | Stop reason: HOSPADM

## 2023-08-21 RX ORDER — ROCURONIUM BROMIDE 10 MG/ML
INJECTION, SOLUTION INTRAVENOUS
Status: DISCONTINUED | OUTPATIENT
Start: 2023-08-21 | End: 2023-08-21

## 2023-08-21 RX ORDER — LIDOCAINE HYDROCHLORIDE 10 MG/ML
1 INJECTION, SOLUTION EPIDURAL; INFILTRATION; INTRACAUDAL; PERINEURAL ONCE AS NEEDED
Status: DISCONTINUED | OUTPATIENT
Start: 2023-08-21 | End: 2023-08-22 | Stop reason: HOSPADM

## 2023-08-21 RX ORDER — SODIUM CHLORIDE 9 MG/ML
INJECTION, SOLUTION INTRAVENOUS CONTINUOUS
Status: DISCONTINUED | OUTPATIENT
Start: 2023-08-21 | End: 2023-08-22 | Stop reason: HOSPADM

## 2023-08-21 RX ORDER — HYDROMORPHONE HYDROCHLORIDE 1 MG/ML
0.2 INJECTION, SOLUTION INTRAMUSCULAR; INTRAVENOUS; SUBCUTANEOUS EVERY 5 MIN PRN
Status: DISCONTINUED | OUTPATIENT
Start: 2023-08-21 | End: 2023-08-22 | Stop reason: HOSPADM

## 2023-08-21 RX ORDER — ONDANSETRON 2 MG/ML
4 INJECTION INTRAMUSCULAR; INTRAVENOUS ONCE AS NEEDED
Status: DISCONTINUED | OUTPATIENT
Start: 2023-08-21 | End: 2023-08-22 | Stop reason: HOSPADM

## 2023-08-21 RX ORDER — MIDAZOLAM HYDROCHLORIDE 1 MG/ML
INJECTION INTRAMUSCULAR; INTRAVENOUS
Status: DISCONTINUED | OUTPATIENT
Start: 2023-08-21 | End: 2023-08-21

## 2023-08-21 RX ORDER — PROPOFOL 10 MG/ML
VIAL (ML) INTRAVENOUS CONTINUOUS PRN
Status: DISCONTINUED | OUTPATIENT
Start: 2023-08-21 | End: 2023-08-21

## 2023-08-21 RX ORDER — DEXAMETHASONE SODIUM PHOSPHATE 4 MG/ML
INJECTION, SOLUTION INTRA-ARTICULAR; INTRALESIONAL; INTRAMUSCULAR; INTRAVENOUS; SOFT TISSUE
Status: DISCONTINUED | OUTPATIENT
Start: 2023-08-21 | End: 2023-08-21

## 2023-08-21 RX ORDER — OXYCODONE HYDROCHLORIDE 5 MG/1
5 TABLET ORAL EVERY 4 HOURS PRN
Status: DISCONTINUED | OUTPATIENT
Start: 2023-08-21 | End: 2023-08-22 | Stop reason: HOSPADM

## 2023-08-21 RX ORDER — ONDANSETRON 2 MG/ML
INJECTION INTRAMUSCULAR; INTRAVENOUS
Status: DISCONTINUED | OUTPATIENT
Start: 2023-08-21 | End: 2023-08-21

## 2023-08-21 RX ORDER — DIPHENHYDRAMINE HYDROCHLORIDE 50 MG/ML
25 INJECTION INTRAMUSCULAR; INTRAVENOUS EVERY 6 HOURS PRN
Status: DISCONTINUED | OUTPATIENT
Start: 2023-08-21 | End: 2023-08-22 | Stop reason: HOSPADM

## 2023-08-21 RX ORDER — LIDOCAINE HYDROCHLORIDE 10 MG/ML
INJECTION INFILTRATION; PERINEURAL
Status: COMPLETED | OUTPATIENT
Start: 2023-08-21 | End: 2023-08-21

## 2023-08-21 RX ORDER — SODIUM CHLORIDE 9 MG/ML
INJECTION, SOLUTION INTRAVENOUS CONTINUOUS PRN
Status: DISCONTINUED | OUTPATIENT
Start: 2023-08-21 | End: 2023-08-21

## 2023-08-21 RX ORDER — LIDOCAINE HYDROCHLORIDE 20 MG/ML
INJECTION INTRAVENOUS
Status: DISCONTINUED | OUTPATIENT
Start: 2023-08-21 | End: 2023-08-21

## 2023-08-21 RX ORDER — FENTANYL CITRATE 50 UG/ML
INJECTION, SOLUTION INTRAMUSCULAR; INTRAVENOUS
Status: DISCONTINUED | OUTPATIENT
Start: 2023-08-21 | End: 2023-08-21

## 2023-08-21 RX ADMIN — IOHEXOL 140 ML: 300 INJECTION, SOLUTION INTRAVENOUS at 01:08

## 2023-08-21 RX ADMIN — Medication 1 EACH: at 12:08

## 2023-08-21 RX ADMIN — LIDOCAINE HYDROCHLORIDE 3 ML: 10 INJECTION, SOLUTION INFILTRATION; PERINEURAL at 12:08

## 2023-08-21 RX ADMIN — PROPOFOL 150 MG: 10 INJECTION, EMULSION INTRAVENOUS at 11:08

## 2023-08-21 RX ADMIN — ROCURONIUM BROMIDE 50 MG: 10 INJECTION, SOLUTION INTRAVENOUS at 11:08

## 2023-08-21 RX ADMIN — ONDANSETRON 4 MG: 2 INJECTION INTRAMUSCULAR; INTRAVENOUS at 12:08

## 2023-08-21 RX ADMIN — MIDAZOLAM HYDROCHLORIDE 2 MG: 1 INJECTION INTRAMUSCULAR; INTRAVENOUS at 11:08

## 2023-08-21 RX ADMIN — LIDOCAINE HYDROCHLORIDE 40 MG: 20 INJECTION INTRAVENOUS at 11:08

## 2023-08-21 RX ADMIN — FENTANYL CITRATE 25 MCG: 50 INJECTION, SOLUTION INTRAMUSCULAR; INTRAVENOUS at 11:08

## 2023-08-21 RX ADMIN — PROPOFOL 150 MCG/KG/MIN: 10 INJECTION, EMULSION INTRAVENOUS at 11:08

## 2023-08-21 RX ADMIN — FENTANYL CITRATE 75 MCG: 50 INJECTION, SOLUTION INTRAMUSCULAR; INTRAVENOUS at 11:08

## 2023-08-21 RX ADMIN — SODIUM CHLORIDE: 9 INJECTION, SOLUTION INTRAVENOUS at 11:08

## 2023-08-21 RX ADMIN — SUGAMMADEX 200 MG: 100 INJECTION, SOLUTION INTRAVENOUS at 01:08

## 2023-08-21 RX ADMIN — DEXAMETHASONE SODIUM PHOSPHATE 4 MG: 4 INJECTION, SOLUTION INTRAMUSCULAR; INTRAVENOUS at 11:08

## 2023-08-21 NOTE — NURSING
Pt arrived to [facility] for Pre Y-90 (Yttrium) mapping and biopsy of liver mass with anesthesia, escorted to room by RN and CRNA. Pt oriented to unit and staff. Plan of care reviewed with patient, patient verbalizes understanding. Comfort measures utilized. Pt safely transferred from stretcher to procedural table. Fall risk reviewed with patient, fall risk interventions maintained with direct observation/attendance.  Blankets applied. Pt prepped and draped utilizing standard sterile technique. Patient placed on continuous monitoring, as required by sedation policy. Timeouts completed utilizing standard universal time-out, per department and facility policy. RN to remain at bedside, continuous monitoring maintained. Pt resting comfortably. Denies pain/discomfort. Will continue to monitor. See flow sheets for monitoring, medication administration, and updates.

## 2023-08-21 NOTE — TRANSFER OF CARE
Anesthesia Transfer of Care Note    Patient: Jessica Garcia    Procedure(s) Performed: * No procedures listed *    Patient location: [facility]    Anesthesia Type: general    Transport from OR: Transported from OR on 6-10 L/min O2 by face mask with adequate spontaneous ventilation    Post pain: adequate analgesia    Post assessment: no apparent anesthetic complications and tolerated procedure well    Post vital signs: stable    Level of consciousness: awake, alert and oriented    Nausea/Vomiting: no nausea/vomiting    Complications: none    Transfer of care protocol was followed      Last vitals:   Visit Vitals  BP  HR  O2 sat  /81  93  100%  16   Breastfeeding No

## 2023-08-21 NOTE — PLAN OF CARE
Patient is stable and ready for discharge. Instructions given to patient and friend. Questions answered. Patient tolerating po liquids with no difficulty. Patient has no pain. Waiting for Resident to come speak with about procedure. Patient dressed and placed in W/C. NADN.

## 2023-08-21 NOTE — NURSING
Pre Yttrium tumor mass mapping and liver biopsy complete. Pt tolerated well. VSS. No signs or symptoms of distress noted. Angioseal closure device in place. Hemostasis 1300. Pt to remain flat until 1500 Pt will be transferred to ROCU bed after nuclear med scan complete and extubated/stabilized.

## 2023-08-21 NOTE — H&P
Vascular and Interventional Radiology   History & Physical    Date:  8/21/2023      Chief Complaint:   Liver mass    History of Present Illness:  Jessica Garcia is a 32 y.o. female who presents for percutaneous liver biopsy and pre-Y90 mapping    Past Medical History:  Past Medical History:   Diagnosis Date    Herniation of lumbar intervertebral disc     Hypercholesteremia     Multiple sclerosis 01/05/2021    Tachycardia        Past Surgical History:  History reviewed. No pertinent surgical history.     Sedation History:    Denies any adverse reactions.  Denies problems laying flat.    Social History:  Social History     Tobacco Use    Smoking status: Never    Smokeless tobacco: Never   Substance Use Topics    Alcohol use: Yes     Alcohol/week: 1.0 standard drink of alcohol     Types: 1 Glasses of wine per week    Drug use: No        Home Medications:   Prior to Admission medications    Medication Sig Start Date End Date Taking? Authorizing Provider   LO LOESTRIN FE 1 mg-10 mcg (24)/10 mcg (2) Tab Take 1 tablet by mouth once daily. 11/20/22  Yes Crystal Guzman MD   hydrocortisone 2.5 % cream Apply topically 2 (two) times daily. 7/15/19   Shira Lyles, NP   ID NOW COVID-19 TEST KIT Kit TEST AS DIRECTED TODAY 6/30/22   Provider, Historical   levocetirizine (XYZAL) 5 MG tablet Take 1 tablet (5 mg total) by mouth every evening.  Patient not taking: Reported on 8/10/2023 7/7/23 11/4/23  Crystal Guzman MD   ocrelizumab (OCREVUS IV) Inject into the vein every 6 (six) months.    Provider, Historical   sumatriptan (IMITREX) 50 MG tablet Take 1 tablet at onset of headache; may take 2nd tablet 2 hours later if needed, but not to exceed two tabs per day. 6/12/23   Leigh Cline, JUVENAL, CNS   triamcinolone acetonide 0.1% (KENALOG) 0.1 % cream Apply to affected areas of body BID prn rash. Do not use on face, underarms, or groin. 10/21/19   Jessica Graves MD       Inpatient Medications:    Current  Outpatient Medications:     LO LOESTRIN FE 1 mg-10 mcg (24)/10 mcg (2) Tab, Take 1 tablet by mouth once daily., Disp: 84 tablet, Rfl: 1    hydrocortisone 2.5 % cream, Apply topically 2 (two) times daily., Disp: 28 g, Rfl: 1    ID NOW COVID-19 TEST KIT Kit, TEST AS DIRECTED TODAY, Disp: , Rfl:     levocetirizine (XYZAL) 5 MG tablet, Take 1 tablet (5 mg total) by mouth every evening. (Patient not taking: Reported on 8/10/2023), Disp: 30 tablet, Rfl: 3    ocrelizumab (OCREVUS IV), Inject into the vein every 6 (six) months., Disp: , Rfl:     sumatriptan (IMITREX) 50 MG tablet, Take 1 tablet at onset of headache; may take 2nd tablet 2 hours later if needed, but not to exceed two tabs per day., Disp: 9 tablet, Rfl: 0    triamcinolone acetonide 0.1% (KENALOG) 0.1 % cream, Apply to affected areas of body BID prn rash. Do not use on face, underarms, or groin., Disp: 80 g, Rfl: 3    Current Facility-Administered Medications:     0.9%  NaCl infusion, , Intravenous, Continuous, Marnie Booker, NP    LIDOcaine (PF) 10 mg/ml (1%) injection 10 mg, 1 mL, Intradermal, Once PRN, Marnie Booker, NP     Anticoagulants/Antiplatelets:   no anticoagulation    Allergies:   Review of patient's allergies indicates:   Allergen Reactions    Lavender Rash       Review of Systems:   As documented in primary provider H&P.    Vital Signs (Most Recent):  BP: 119/76 (08/21/23 1047)    Physical Exam:  No acute distress, laying comfortably in bed, pleasant and cooperative  Regular rate and rhythm  Breathing unlabored  Abdomen benign  Extremities warm and well perfused    Sedation Exam:  ASA: III - Patient appears to have severe systemic disease not posing a constant threat to life   Mallampati: II (hard and soft palate, upper portion of tonsils anduvula visible)     Laboratory:  Lab Results   Component Value Date    INR 1.2 08/21/2023       Lab Results   Component Value Date    WBC 6.59 08/21/2023    HGB 11.0 (L) 08/21/2023    HCT 35.8 (L)  08/21/2023    MCV 96 08/21/2023     08/21/2023      Lab Results   Component Value Date    GLU 85 08/21/2023     08/21/2023    K 4.1 08/21/2023     08/21/2023    CO2 27 08/21/2023    BUN 13 08/21/2023    CREATININE 0.7 08/21/2023    CALCIUM 9.3 08/21/2023    ALT 21 08/21/2023    AST 36 08/21/2023    ALBUMIN 3.5 08/21/2023    BILITOT 0.8 08/21/2023    BILIDIR 0.3 08/21/2023       Imaging:  Reviewed.      ASSESSMENT/PLAN:                       Sedation Plan: Per anesthesia  Patient will undergo: Percutaneous liver biopsy and pre-Y90 mapping    Romario Hendrickson M.D.  Resident, Diagnostic and Interventional Radiology  Department of Radiology  [facility]

## 2023-08-21 NOTE — DISCHARGE INSTRUCTIONS
For scheduling: Call Gabby at [phone]    For questions or concerns call: BLANK MON-FRI 8 AM- 5PM [phone]. Radiology resident on call [phone].    For immediate concerns that are not emergent, you may call our radiology clinic at: [phone]    May remove dressing in 24 hours and shower.   Do Not sit in bath water, hot tub, or swim for 7 days   Do not drive for 3 days  Do not lift anything heavier than 10 lbs for the next 3 days

## 2023-08-21 NOTE — ANESTHESIA PROCEDURE NOTES
Intubation    Date/Time: 8/21/2023 11:54 AM    Performed by: Joanie Ford CRNA  Authorized by: Philip Bah MD    Intubation:     Induction:  Intravenous    Intubated:  Postinduction    Mask Ventilation:  Easy mask    Attempts:  1    Attempted By:  CRNA    Method of Intubation:  Video laryngoscopy    Blade:  [place] 3    Laryngeal View Grade: Grade I - full view of cords      Difficult Airway Encountered?: No      Complications:  None    Airway Device:  Oral endotracheal tube    Airway Device Size:  7.0    Style/Cuff Inflation:  Cuffed (inflated to minimal occlusive pressure)    Inflation Amount (mL):  10    Tube secured:  20    Secured at:  The lips    Placement Verified By:  Capnometry    Complicating Factors:  None    Findings Post-Intubation:  BS equal bilateral and atraumatic/condition of teeth unchanged

## 2023-08-21 NOTE — PROCEDURES
"  Pre Op Diagnosis: Liver masses  Post Op Diagnosis: Same    Procedure: Liver mass biopsy and Y90 Mapping    Procedure performed by: Sheri    Written Informed Consent Obtained: Yes  Specimen Removed:  g cores  Estimated Blood Loss: Minimal    Findings:   Successful US guided liver mass biopsy with 8 18g cores removed. Y90 mapping then performed. Gel foam pledgets used for tract embolization of liver mass and angioseal used for arterial closure.     Patient tolerated procedure well.    Hector Wade MD ([facility])  Interventional Radiology  [phone]      "

## 2023-08-21 NOTE — PLAN OF CARE
Patient ambulated on unit with friend at her side.  Verbalized an understanding of procedure.  Oriented to unit and call bell provided.  Denies pain or SOB.  Will continue to monitor.

## 2023-08-21 NOTE — ANESTHESIA PREPROCEDURE EVALUATION
08/21/2023  Jessica Garcia is a 32 y.o., female.  Patient Active Problem List   Diagnosis    Thyroid nodule    Vitamin D deficiency    Disc displacement, lumbar    [place] cardiac risk <10% in next 10 years    Oral contraceptive use    Focal nodular hyperplasia of liver    Nonintractable headache    Multiple sclerosis    Migraine without aura and without status migrainosus, not intractable    Medication overuse headache           Pre-op Assessment          Review of Systems      Physical Exam    Airway:  No airway management difficulties anticipated  Dental:No active dental issues noted  Chest/Lungs:  Clear to auscultation    Heart:  Rate: Normal  Rhythm: Regular Rhythm  Sounds: Normal        Anesthesia Plan  Type of Anesthesia, risks & benefits discussed:    Anesthesia Type: Gen ETT  Informed Consent: Informed consent signed with the Patient and all parties understand the risks and agree with anesthesia plan.  All questions answered.   ASA Score: 3  Anesthesia Plan Notes: Chart reviewed. Patient seen and examined. Anesthesia plan discussed and questions answered. E-consent signed. Philip Bah MD    Ready For Surgery From Anesthesia Perspective.     .

## 2023-08-22 ENCOUNTER — HOSPITAL ENCOUNTER (EMERGENCY)
Facility: HOSPITAL | Age: 33
Discharge: HOME | End: 2023-08-22
Attending: EMERGENCY MEDICINE
Payer: COMMERCIAL

## 2023-08-22 VITALS
BODY MASS INDEX: 25.83 KG/M2 | WEIGHT: 155 LBS | SYSTOLIC BLOOD PRESSURE: 107 MMHG | DIASTOLIC BLOOD PRESSURE: 66 MMHG | HEIGHT: 65 IN | RESPIRATION RATE: 18 BRPM | TEMPERATURE: 99 F | OXYGEN SATURATION: 99 % | HEART RATE: 78 BPM

## 2023-08-22 DIAGNOSIS — J02.9 SORE THROAT: ICD-10-CM

## 2023-08-22 DIAGNOSIS — R10.31 RIGHT GROIN PAIN: Primary | ICD-10-CM

## 2023-08-22 DIAGNOSIS — G89.18 POST-OP PAIN: ICD-10-CM

## 2023-08-22 LAB
ABO + RH BLD: NORMAL
ALBUMIN SERPL BCP-MCNC: 3.4 G/DL (ref 3.5–5.2)
ALP SERPL-CCNC: 43 U/L (ref 55–135)
ALT SERPL W/O P-5'-P-CCNC: 19 U/L (ref 10–44)
ANION GAP SERPL CALC-SCNC: 13 MMOL/L (ref 8–16)
AST SERPL-CCNC: 38 U/L (ref 10–40)
BACTERIA #/AREA URNS AUTO: ABNORMAL /HPF
BASOPHILS # BLD AUTO: 0.05 K/UL (ref 0–0.2)
BASOPHILS NFR BLD: 0.4 % (ref 0–1.9)
BILIRUB SERPL-MCNC: 0.9 MG/DL (ref 0.1–1)
BILIRUB UR QL STRIP: NEGATIVE
BLD GP AB SCN CELLS X3 SERPL QL: NORMAL
BUN SERPL-MCNC: 14 MG/DL (ref 6–20)
CALCIUM SERPL-MCNC: 9 MG/DL (ref 8.7–10.5)
CAOX CRY UR QL COMP ASSIST: ABNORMAL
CHLORIDE SERPL-SCNC: 106 MMOL/L (ref 95–110)
CLARITY UR REFRACT.AUTO: ABNORMAL
CO2 SERPL-SCNC: 23 MMOL/L (ref 23–29)
COLOR UR AUTO: YELLOW
CREAT SERPL-MCNC: 0.7 MG/DL (ref 0.5–1.4)
DIFFERENTIAL METHOD: ABNORMAL
EOSINOPHIL # BLD AUTO: 0.1 K/UL (ref 0–0.5)
EOSINOPHIL NFR BLD: 0.4 % (ref 0–8)
ERYTHROCYTE [DISTWIDTH] IN BLOOD BY AUTOMATED COUNT: 13.5 % (ref 11.5–14.5)
EST. GFR  (NO RACE VARIABLE): >60 ML/MIN/1.73 M^2
GLUCOSE SERPL-MCNC: 127 MG/DL (ref 70–110)
GLUCOSE UR QL STRIP: NEGATIVE
HCT VFR BLD AUTO: 33.7 % (ref 37–48.5)
HGB BLD-MCNC: 10.6 G/DL (ref 12–16)
HGB UR QL STRIP: NEGATIVE
HIV 1+2 AB+HIV1 P24 AG SERPL QL IA: NORMAL
HYALINE CASTS UR QL AUTO: 4 /LPF
IMM GRANULOCYTES # BLD AUTO: 0.04 K/UL (ref 0–0.04)
IMM GRANULOCYTES NFR BLD AUTO: 0.3 % (ref 0–0.5)
INR PPP: 1.2 (ref 0.8–1.2)
KETONES UR QL STRIP: NEGATIVE
LACTATE SERPL-SCNC: 2.3 MMOL/L (ref 0.5–2.2)
LEUKOCYTE ESTERASE UR QL STRIP: NEGATIVE
LYMPHOCYTES # BLD AUTO: 2.6 K/UL (ref 1–4.8)
LYMPHOCYTES NFR BLD: 22.5 % (ref 18–48)
MCH RBC QN AUTO: 29.7 PG (ref 27–31)
MCHC RBC AUTO-ENTMCNC: 31.5 G/DL (ref 32–36)
MCV RBC AUTO: 94 FL (ref 82–98)
MICROSCOPIC COMMENT: ABNORMAL
MONOCYTES # BLD AUTO: 1.1 K/UL (ref 0.3–1)
MONOCYTES NFR BLD: 9.2 % (ref 4–15)
NEUTROPHILS # BLD AUTO: 7.7 K/UL (ref 1.8–7.7)
NEUTROPHILS NFR BLD: 67.2 % (ref 38–73)
NITRITE UR QL STRIP: NEGATIVE
NRBC BLD-RTO: 0 /100 WBC
PH UR STRIP: 6 [PH] (ref 5–8)
PLATELET # BLD AUTO: 372 K/UL (ref 150–450)
PMV BLD AUTO: 9.9 FL (ref 9.2–12.9)
POTASSIUM SERPL-SCNC: 3.4 MMOL/L (ref 3.5–5.1)
PROT SERPL-MCNC: 6.9 G/DL (ref 6–8.4)
PROT UR QL STRIP: ABNORMAL
PROTHROMBIN TIME: 13 SEC (ref 9–12.5)
RBC # BLD AUTO: 3.57 M/UL (ref 4–5.4)
RBC #/AREA URNS AUTO: 2 /HPF (ref 0–4)
SODIUM SERPL-SCNC: 142 MMOL/L (ref 136–145)
SP GR UR STRIP: >1.03 (ref 1–1.03)
SPECIMEN OUTDATE: NORMAL
SQUAMOUS #/AREA URNS AUTO: 4 /HPF
URN SPEC COLLECT METH UR: ABNORMAL
WBC # BLD AUTO: 11.5 K/UL (ref 3.9–12.7)
WBC #/AREA URNS AUTO: 4 /HPF (ref 0–5)

## 2023-08-22 PROCEDURE — 85610 PROTHROMBIN TIME: CPT | Performed by: EMERGENCY MEDICINE

## 2023-08-22 PROCEDURE — 96361 HYDRATE IV INFUSION ADD-ON: CPT

## 2023-08-22 PROCEDURE — 83605 ASSAY OF LACTIC ACID: CPT | Performed by: EMERGENCY MEDICINE

## 2023-08-22 PROCEDURE — 86900 BLOOD TYPING SEROLOGIC ABO: CPT | Performed by: EMERGENCY MEDICINE

## 2023-08-22 PROCEDURE — 96374 THER/PROPH/DIAG INJ IV PUSH: CPT

## 2023-08-22 PROCEDURE — 87389 HIV-1 AG W/HIV-1&-2 AB AG IA: CPT | Performed by: PHYSICIAN ASSISTANT

## 2023-08-22 PROCEDURE — 85025 COMPLETE CBC W/AUTO DIFF WBC: CPT | Performed by: EMERGENCY MEDICINE

## 2023-08-22 PROCEDURE — 99284 PR EMERGENCY DEPT VISIT,LEVEL IV: ICD-10-PCS | Mod: ,,, | Performed by: PHYSICIAN ASSISTANT

## 2023-08-22 PROCEDURE — 99284 EMERGENCY DEPT VISIT MOD MDM: CPT | Mod: ,,, | Performed by: PHYSICIAN ASSISTANT

## 2023-08-22 PROCEDURE — 80053 COMPREHEN METABOLIC PANEL: CPT | Performed by: EMERGENCY MEDICINE

## 2023-08-22 PROCEDURE — 99285 EMERGENCY DEPT VISIT HI MDM: CPT | Mod: 25

## 2023-08-22 PROCEDURE — 81001 URINALYSIS AUTO W/SCOPE: CPT | Performed by: EMERGENCY MEDICINE

## 2023-08-22 PROCEDURE — 25000003 PHARM REV CODE 250: Performed by: EMERGENCY MEDICINE

## 2023-08-22 PROCEDURE — 63600175 PHARM REV CODE 636 W HCPCS: Performed by: EMERGENCY MEDICINE

## 2023-08-22 RX ORDER — MORPHINE SULFATE 2 MG/ML
2 INJECTION, SOLUTION INTRAMUSCULAR; INTRAVENOUS
Status: COMPLETED | OUTPATIENT
Start: 2023-08-22 | End: 2023-08-22

## 2023-08-22 RX ORDER — LIDOCAINE HYDROCHLORIDE 20 MG/ML
5 SOLUTION OROPHARYNGEAL
Status: COMPLETED | OUTPATIENT
Start: 2023-08-22 | End: 2023-08-22

## 2023-08-22 RX ORDER — IBUPROFEN 600 MG/1
600 TABLET ORAL
Status: COMPLETED | OUTPATIENT
Start: 2023-08-22 | End: 2023-08-22

## 2023-08-22 RX ADMIN — LIDOCAINE HYDROCHLORIDE 5 ML: 20 SOLUTION ORAL at 12:08

## 2023-08-22 RX ADMIN — IBUPROFEN 600 MG: 600 TABLET, FILM COATED ORAL at 03:08

## 2023-08-22 RX ADMIN — SODIUM CHLORIDE 1000 ML: 9 INJECTION, SOLUTION INTRAVENOUS at 12:08

## 2023-08-22 RX ADMIN — MORPHINE SULFATE 2 MG: 2 INJECTION, SOLUTION INTRAMUSCULAR; INTRAVENOUS at 12:08

## 2023-08-22 NOTE — CONSULTS
"Interventional Radiology  Consult Note    Consult Requested By: Niall Nelson MD   Reason for Consult: post cath pain     SUBJECTIVE:     Chief Complaint:  R groin access pain following y-90 mapping    History of Present Illness:  Jessica Garcia is a 32 y.o. female with a PMHx of MS and large liver mass suspicious for atypical HCC vs atypical FNH vs atypical adenoma s/p y-90 mapping and liver mass biopsy on 8/21/23 who presents to the ED on 8/22/23 for severe pain at R groin access site. Interventional Radiology has been consulted for eval of her access site. Pt reports " she was sitting eating breakfast when she felt at "pop" at R groin incision site, state she started feeling weak and dizzy aftewards".  Pt says pain has improved since she presented to the ED. She denies pain at rest but has pain when the access site is touched. Pt underwent US RLE in ED which was negative for PSA. The pt's H/H is currently stable following her procedure yesterday. The pt is hemodynamically stable.     Review of Systems   Constitutional: Negative.    HENT:  Positive for sore throat.    Respiratory: Negative.     Cardiovascular: Negative.    Gastrointestinal: Negative.    Genitourinary: Negative.    Musculoskeletal:         R groin access site pain   Neurological:  Positive for weakness (generalized). Negative for seizures and loss of consciousness.       Scheduled Meds:   ibuprofen  600 mg Oral ED 1 Time     Continuous Infusions:  PRN Meds:    Review of patient's allergies indicates:   Allergen Reactions    Lavender Rash       Past Medical History:   Diagnosis Date    Herniation of lumbar intervertebral disc     Hypercholesteremia     Multiple sclerosis 01/05/2021    Tachycardia      History reviewed. No pertinent surgical history.  Family History   Problem Relation Age of Onset    Hypertension Mother     Thyroid disease Mother     Hypertension Father     Hypertension Sister     Hyperlipidemia Sister     Heart disease " Maternal Grandfather     Hypertension Paternal Grandmother     Melanoma Neg Hx     Breast cancer Neg Hx     Colon cancer Neg Hx     Ovarian cancer Neg Hx      Social History     Tobacco Use    Smoking status: Never    Smokeless tobacco: Never   Substance Use Topics    Alcohol use: Yes     Alcohol/week: 1.0 standard drink of alcohol     Types: 1 Glasses of wine per week    Drug use: No       OBJECTIVE:     Vital Signs (Most Recent)  Temp: 98.4 °F (36.9 °C) (08/22/23 1310)  Pulse: 80 (08/22/23 1310)  Resp: 20 (08/22/23 1310)  BP: 105/62 (08/22/23 1310)  SpO2: 98 % (08/22/23 1310)    Physical Exam:  Physical Exam  Vitals and nursing note reviewed.   Constitutional:       General: She is not in acute distress.     Appearance: Normal appearance. She is not ill-appearing.   HENT:      Head: Normocephalic and atraumatic.      Right Ear: External ear normal.      Left Ear: External ear normal.   Eyes:      Extraocular Movements: Extraocular movements intact.      Conjunctiva/sclera: Conjunctivae normal.      Pupils: Pupils are equal, round, and reactive to light.   Pulmonary:      Effort: Pulmonary effort is normal.   Abdominal:      General: Abdomen is flat.   Musculoskeletal:      Comments: R groin access site healing well, mild TTP. No drainage, erythema, edema, bruising, or pulsatility.    Skin:     General: Skin is warm and dry.      Coloration: Skin is not jaundiced.   Neurological:      Mental Status: She is alert.         Laboratory  I have reviewed all pertinent lab results within the past 24 hours.  CBC:   Recent Labs   Lab 08/22/23  1200   WBC 11.50   RBC 3.57*   HGB 10.6*   HCT 33.7*      MCV 94   MCH 29.7   MCHC 31.5*     BMP:   Recent Labs   Lab 08/22/23  1200   *      K 3.4*      CO2 23   BUN 14   CREATININE 0.7   CALCIUM 9.0     CMP:   Recent Labs   Lab 08/22/23  1200   *   CALCIUM 9.0   ALBUMIN 3.4*   PROT 6.9      K 3.4*   CO2 23      BUN 14   CREATININE 0.7    ALKPHOS 43*   ALT 19   AST 38   BILITOT 0.9     LFTs:   Recent Labs   Lab 08/22/23  1200   ALT 19   AST 38   ALKPHOS 43*   BILITOT 0.9   PROT 6.9   ALBUMIN 3.4*     Coagulation:   Recent Labs   Lab 08/22/23  1200   LABPROT 13.0*   INR 1.2     Microbiology Results (last 7 days)       ** No results found for the last 168 hours. **            ASA/Mallampati  ASA: 2  Mallampati: 2    Imaging:  Recent imaging studies including  RLE US  on 8/22/23     EXAMINATION:  US LOWER EXTREMITY ARTERIES RIGHT     CLINICAL HISTORY:  Right lower quadrant pain     TECHNIQUE:  Focused ultrasound of the right lower extremity arteries was performed.     COMPARISON:  None     FINDINGS:  The right iliac, common femoral, and femoral arteries are patent with triphasic waveforms.  No pseudoaneurysm or subcutaneous collection.  Trace free fluid in the pelvis.     Impression:     As above.        Electronically signed by: Chidi Katz  Date:                                            08/22/2023  Time:                                           15:06    ASSESSMENT/PLAN:     Assessment:  32 y.o. female with a PMHx of  MS and large liver mass suspicious for atypical HCC vs atypical FNH vs atypical adenoma s/p y-90 mapping and liver mass biopsy on 8/21/23 who is s/p IR y-90 mapping and liver mass biopsy on 8/21/23. Pt seen for R groin access site pain. Access site appear to be healing well with minimal TTP. No swelling, TTP or pulsatility noted. H/H and vitals stable. RLE US negative for PSA. No IR intervention needed at this time. Reassurance provided to pt. Advised pt to continue to not lift anything heavier than 5 lbs and to avoid vigorous activity for the next 2 days. Instructed pt to continue to monitor for signs of PSA including severe, excruciating pain, worsening swelling, worsening bruising, or palpable pulsatility. All questions answered and contact info provided should pt have any issues in the future.     Plan:  No need for any IR  intervention- see above  Thank you for the consult. IR will sign off. Please contact with questions via [employer] secure chat or spectra link    Maryuri Smith PA-C  Interventional Radiology  Spectra: [phone]

## 2023-08-22 NOTE — ANESTHESIA POSTPROCEDURE EVALUATION
Anesthesia Post Evaluation    Patient: Jessica Garcia    Procedure(s) Performed: * No procedures listed *    Final Anesthesia Type: general      Level of consciousness: awake and alert  Post-procedure vital signs: reviewed and stable  Pain control: Pain has been treated.  Airway patency: patent    PONV status: Absent or treated.  Anesthetic complications: no      Cardiovascular status: hemodynamically stable  Respiratory status: unassisted  Hydration status: euvolemic            Vitals Value Taken Time   /66 08/21/23 1602   Temp 36.6 °C (97.9 °F) 08/21/23 1600   Pulse 95 08/21/23 1610   Resp 35 08/21/23 1523   SpO2 95 % 08/21/23 1610   Vitals shown include unvalidated device data.      No case tracking events are documented in the log.      Pain/Ike Score: Ike Score: 10 (8/21/2023  3:00 PM)

## 2023-08-22 NOTE — ED NOTES
"Pt ambulatory to ED for weakness and pain at R groin incision site from IR procedure yesterday.  Pt states she was sitting eating breakfast when she felt at "pop" at R groin incision site, state she started feeling weak and dizzy aftewards.  R groin incision site dressing C/D/I, no active bleeding present, incision site has minor bruising, no active drainage.  Pt VSS. Skin cool/clammy, afebrile. RR even/unlabored. Pt denies SOB, endorses throat discomfort, throat reddened with minor swelling, airway patent.  Bed low/locked, siderails upx2, pt agrees to plan of care.  Call light within reach.  "

## 2023-08-22 NOTE — DISCHARGE INSTRUCTIONS
Do not lift anything heavier than 5 lbs and avoid vigorous activity for the next 2 days.   Return to the ER for any severe, excruciating pain, worsening swelling, worsening bruising, or palpable pulsatility.   Use the lozenges to help with your throat soreness.

## 2023-08-22 NOTE — ED PROVIDER NOTES
"Encounter Date: 8/22/2023       History     Chief Complaint   Patient presents with    Post-op Problem     Liver biopsy yest, pale      31 yo W with pmhx HLD, MS, liver mass presents post-procedure day #1 from liver mass biopsy and Y90 mapping with a chief complaint of right groin pain and weakness.  Postprocedurally, patient endorsed feeling sore throat.  This has been constant.  Despite that, she has been able to eat.  She was having a donut this morning when she felt a "pop" in her right groin and severe pain.  Pain is 10/10 and constant since that time.  She reports feeling weak and lightheaded and presyncopal as well.  She would not taken anything for the pain.  She spoke with the anesthesiologist regarding the pain and came to the ER.  According to the procedure note, the right groin site was used for the Y90 mapping and closed with Angio-Seal.        Review of patient's allergies indicates:   Allergen Reactions    Lavender Rash     Past Medical History:   Diagnosis Date    Herniation of lumbar intervertebral disc     Hypercholesteremia     Multiple sclerosis 01/05/2021    Tachycardia      History reviewed. No pertinent surgical history.  Family History   Problem Relation Age of Onset    Hypertension Mother     Thyroid disease Mother     Hypertension Father     Hypertension Sister     Hyperlipidemia Sister     Heart disease Maternal Grandfather     Hypertension Paternal Grandmother     Melanoma Neg Hx     Breast cancer Neg Hx     Colon cancer Neg Hx     Ovarian cancer Neg Hx      Social History     Tobacco Use    Smoking status: Never    Smokeless tobacco: Never   Substance Use Topics    Alcohol use: Yes     Alcohol/week: 1.0 standard drink of alcohol     Types: 1 Glasses of wine per week    Drug use: No     Review of Systems    Physical Exam     Initial Vitals [08/22/23 1108]   BP Pulse Resp Temp SpO2   (!) 90/50 81 20 98.2 °F (36.8 °C) 99 %      MAP       --         Physical Exam    Nursing note and vitals " reviewed.  Constitutional: She appears well-developed and well-nourished. She is not diaphoretic. No distress.   HENT:   Head: Normocephalic and atraumatic.   Posterior oropharyngeal erythema   Eyes: Right eye exhibits no discharge. Left eye exhibits no discharge. No scleral icterus.   Neck: Neck supple. No JVD present.   Normal range of motion.  Cardiovascular:  Normal rate, regular rhythm and normal heart sounds.     Exam reveals no gallop and no friction rub.       No murmur heard.  Pulmonary/Chest: Breath sounds normal. No respiratory distress. She has no wheezes. She has no rhonchi. She has no rales.   Abdominal: Abdomen is soft. She exhibits no distension and no mass. There is no abdominal tenderness.   R abdominal biopsy site with dressing in place without shadowing  No significant abdominal tenderness There is no rebound and no guarding.   Genitourinary:    Genitourinary Comments: R inguinal cath site with dressing in place.  It was taken down and I do see a few small puncture wounds.  No bleeding.  There is tenderness to palpation in that region.     Musculoskeletal:         General: No tenderness or edema. Normal range of motion.      Cervical back: Normal range of motion and neck supple.     Neurological: She is alert and oriented to person, place, and time. She has normal strength. No sensory deficit.   Skin: Skin is warm and dry. Capillary refill takes less than 2 seconds. There is pallor.   Psychiatric: Thought content normal.   anxious         ED Course   Procedures  Labs Reviewed   CBC W/ AUTO DIFFERENTIAL - Abnormal; Notable for the following components:       Result Value    RBC 3.57 (*)     Hemoglobin 10.6 (*)     Hematocrit 33.7 (*)     MCHC 31.5 (*)     Mono # 1.1 (*)     All other components within normal limits   COMPREHENSIVE METABOLIC PANEL - Abnormal; Notable for the following components:    Potassium 3.4 (*)     Glucose 127 (*)     Albumin 3.4 (*)     Alkaline Phosphatase 43 (*)     All  other components within normal limits   LACTIC ACID, PLASMA - Abnormal; Notable for the following components:    Lactate (Lactic Acid) 2.3 (*)     All other components within normal limits   PROTIME-INR - Abnormal; Notable for the following components:    Prothrombin Time 13.0 (*)     All other components within normal limits   URINALYSIS, REFLEX TO URINE CULTURE - Abnormal; Notable for the following components:    Appearance, UA Hazy (*)     Specific Gravity, UA >1.030 (*)     Protein, UA 1+ (*)     All other components within normal limits    Narrative:     Specimen Source->Urine   URINALYSIS MICROSCOPIC - Abnormal; Notable for the following components:    Hyaline Casts, UA 4 (*)     All other components within normal limits    Narrative:     Specimen Source->Urine   HIV 1 / 2 ANTIBODY    Narrative:     Release to patient->Immediate   TYPE & SCREEN          Imaging Results              US Lower Extremity Arteries Right (Final result)  Result time 08/22/23 15:06:26      Final result by Chidi Katz MD (08/22/23 15:06:26)                   Impression:      As above.      Electronically signed by: Chidi Katz  Date:    08/22/2023  Time:    15:06               Narrative:    EXAMINATION:  US LOWER EXTREMITY ARTERIES RIGHT    CLINICAL HISTORY:  Right lower quadrant pain    TECHNIQUE:  Focused ultrasound of the right lower extremity arteries was performed.    COMPARISON:  None    FINDINGS:  The right iliac, common femoral, and femoral arteries are patent with triphasic waveforms.  No pseudoaneurysm or subcutaneous collection.  Trace free fluid in the pelvis.                                       Medications   sodium chloride 0.9% bolus 1,000 mL 1,000 mL (0 mLs Intravenous Stopped 8/22/23 1353)   morphine injection 2 mg (2 mg Intravenous Given 8/22/23 1200)   LIDOcaine HCl 2% oral solution 5 mL (5 mLs Oral Given 8/22/23 1208)   ibuprofen tablet 600 mg (600 mg Oral Given 8/22/23 1545)     Medical Decision Making  31 yo  W with pmhx HLD, MS, liver mass presents post-procedure day #1 from liver mass biopsy and Y90 mapping with a chief complaint of right groin pain and weakness.  Initial blood pressure 90/50.  Repeat in room was 102/58.    My differential diagnosis includes, but is not limited to:  Hematoma, pseudoaneurysm, anemia    Will administer viscous lidocaine for sore throat status post intubation.  Will administer IV fluids and morphine.  Will obtain ultrasound to evaluate for pseudoaneurysm or other postoperative complication at the groin site.  Will obtain labs.    Reassessment:  CBC without leukocytosis.  Hemoglobin 10.6 slightly down from 11.0 yesterday.  CMP with mild hypokalemia of 3.4.  Lactic acid slightly elevated at 2.3.    Reassessment:  Ultrasound negative for pseudoaneurysm or other acute abnormality.  There is trace free fluid in pelvis.  Blood pressure 105/62.  On repeat assessment, patient reports pain slightly improved but still present.  Will administer ibuprofen.  IR consulted for postprocedural pain evaluation.    Reassessment:  Repeat blood pressure improved.  IR evaluated the patient and do not feel there is any postprocedure complication.  Advised no heavy lifting and light activity times 48 hours.  On repeat assessment, patient is comfortable with that plan.  Script for lozenges provided.  Return precautions.    Amount and/or Complexity of Data Reviewed  Labs: ordered.    Risk  OTC drugs.  Prescription drug management.                               Clinical Impression:   Final diagnoses:  [R10.31] Right groin pain (Primary)  [G89.18] Post-op pain  [J02.9] Sore throat        ED Disposition Condition    Discharge Stable          ED Prescriptions       Medication Sig Dispense Start Date End Date Auth. Provider    benzocaine-menthoL 6-10 mg lozenge Take 1 lozenge by mouth every 2 (two) hours as needed for Pain. 18 tablet 8/22/2023 -- Niall Nelson MD          Follow-up Information    None           Niall Nelson MD  08/22/23 [id number]

## 2023-08-24 ENCOUNTER — NURSE TRIAGE (OUTPATIENT)
Dept: ADMINISTRATIVE | Facility: CLINIC | Age: 33
End: 2023-08-24
Payer: COMMERCIAL

## 2023-08-24 ENCOUNTER — TELEPHONE (OUTPATIENT)
Dept: INTERVENTIONAL RADIOLOGY/VASCULAR | Facility: HOSPITAL | Age: 33
End: 2023-08-24
Payer: COMMERCIAL

## 2023-08-24 ENCOUNTER — PATIENT MESSAGE (OUTPATIENT)
Dept: FAMILY MEDICINE | Facility: CLINIC | Age: 33
End: 2023-08-24
Payer: COMMERCIAL

## 2023-08-24 ENCOUNTER — OFFICE VISIT (OUTPATIENT)
Dept: HEMATOLOGY/ONCOLOGY | Facility: CLINIC | Age: 33
End: 2023-08-24
Payer: COMMERCIAL

## 2023-08-24 VITALS
WEIGHT: 156.75 LBS | DIASTOLIC BLOOD PRESSURE: 71 MMHG | SYSTOLIC BLOOD PRESSURE: 122 MMHG | HEIGHT: 65 IN | BODY MASS INDEX: 26.12 KG/M2 | RESPIRATION RATE: 18 BRPM | HEART RATE: 105 BPM | OXYGEN SATURATION: 98 %

## 2023-08-24 DIAGNOSIS — Z17.0 MALIGNANT NEOPLASM OF LEFT BREAST IN FEMALE, ESTROGEN RECEPTOR POSITIVE, UNSPECIFIED SITE OF BREAST: Primary | ICD-10-CM

## 2023-08-24 DIAGNOSIS — G43.009 MIGRAINE WITHOUT AURA AND WITHOUT STATUS MIGRAINOSUS, NOT INTRACTABLE: ICD-10-CM

## 2023-08-24 DIAGNOSIS — C78.7 METASTASIS TO LIVER: ICD-10-CM

## 2023-08-24 DIAGNOSIS — G35 MULTIPLE SCLEROSIS: ICD-10-CM

## 2023-08-24 DIAGNOSIS — C50.912 MALIGNANT NEOPLASM OF LEFT BREAST IN FEMALE, ESTROGEN RECEPTOR POSITIVE, UNSPECIFIED SITE OF BREAST: Primary | ICD-10-CM

## 2023-08-24 DIAGNOSIS — K76.9 LIVER LESION: ICD-10-CM

## 2023-08-24 LAB
ADEQUACY: NORMAL
COMMENT: NORMAL
FINAL PATHOLOGIC DIAGNOSIS: NORMAL
GROSS: NORMAL
Lab: NORMAL

## 2023-08-24 PROCEDURE — 3044F HG A1C LEVEL LT 7.0%: CPT | Mod: CPTII,S$GLB,, | Performed by: INTERNAL MEDICINE

## 2023-08-24 PROCEDURE — 99214 OFFICE O/P EST MOD 30 MIN: CPT | Mod: S$GLB,,, | Performed by: INTERNAL MEDICINE

## 2023-08-24 PROCEDURE — 3078F PR MOST RECENT DIASTOLIC BLOOD PRESSURE < 80 MM HG: ICD-10-PCS | Mod: CPTII,S$GLB,, | Performed by: INTERNAL MEDICINE

## 2023-08-24 PROCEDURE — 1159F MED LIST DOCD IN RCRD: CPT | Mod: CPTII,S$GLB,, | Performed by: INTERNAL MEDICINE

## 2023-08-24 PROCEDURE — 99214 PR OFFICE/OUTPT VISIT, EST, LEVL IV, 30-39 MIN: ICD-10-PCS | Mod: S$GLB,,, | Performed by: INTERNAL MEDICINE

## 2023-08-24 PROCEDURE — 99999 PR PBB SHADOW E&M-EST. PATIENT-LVL III: ICD-10-PCS | Mod: PBBFAC,,, | Performed by: INTERNAL MEDICINE

## 2023-08-24 PROCEDURE — 3074F SYST BP LT 130 MM HG: CPT | Mod: CPTII,S$GLB,, | Performed by: INTERNAL MEDICINE

## 2023-08-24 PROCEDURE — 3044F PR MOST RECENT HEMOGLOBIN A1C LEVEL <7.0%: ICD-10-PCS | Mod: CPTII,S$GLB,, | Performed by: INTERNAL MEDICINE

## 2023-08-24 PROCEDURE — 3008F PR BODY MASS INDEX (BMI) DOCUMENTED: ICD-10-PCS | Mod: CPTII,S$GLB,, | Performed by: INTERNAL MEDICINE

## 2023-08-24 PROCEDURE — 3008F BODY MASS INDEX DOCD: CPT | Mod: CPTII,S$GLB,, | Performed by: INTERNAL MEDICINE

## 2023-08-24 PROCEDURE — 3078F DIAST BP <80 MM HG: CPT | Mod: CPTII,S$GLB,, | Performed by: INTERNAL MEDICINE

## 2023-08-24 PROCEDURE — 1159F PR MEDICATION LIST DOCUMENTED IN MEDICAL RECORD: ICD-10-PCS | Mod: CPTII,S$GLB,, | Performed by: INTERNAL MEDICINE

## 2023-08-24 PROCEDURE — 3074F PR MOST RECENT SYSTOLIC BLOOD PRESSURE < 130 MM HG: ICD-10-PCS | Mod: CPTII,S$GLB,, | Performed by: INTERNAL MEDICINE

## 2023-08-24 PROCEDURE — 99999 PR PBB SHADOW E&M-EST. PATIENT-LVL III: CPT | Mod: PBBFAC,,, | Performed by: INTERNAL MEDICINE

## 2023-08-24 NOTE — PROGRESS NOTES
Spoke to patient. Patient complaining of groin soreness and some bruising. Patient had a US on 8/22 through the ED of the area to rule out complication. Provided reassurance and encouraged patient to give the groin a little more time to heal. Will place a follow up call on Monday. Patient agreed to plan.     Elaine Elmore PA-C  Interventional Radiology  [phone]

## 2023-08-24 NOTE — Clinical Note
Mendy Baker and Brent. This is a maryanne patient who I initially saw for concern for HCC.  Unfortunately she has biopsy proven ER/ID+ metastatic breast cancer to liver.  Olivia has agreed to see her.  She is very anxious - understandably.  Could we get her in ASAP to see you?  She is willing to come as soon as you can see her. Thanks! Efra

## 2023-08-24 NOTE — TELEPHONE ENCOUNTER
Pt stated she went to IR on Monday for biopsy and Y90 mapping. The next morning she felt like something pop and her bp dropped so she went back to Er. She's been having a lot of bruising and pain. Its hard for her to put weight on her right leg. Pain 6-7/10. Pain is constant. Pt ended triage call stated she wanted to wait until radiology department opens and speak directly with them. Pt ended triage call.   Reason for Disposition   Nursing judgment    Protocols used: No Guideline or Reference [id number]

## 2023-08-25 ENCOUNTER — TELEPHONE (OUTPATIENT)
Dept: HEMATOLOGY/ONCOLOGY | Facility: CLINIC | Age: 33
End: 2023-08-25
Payer: COMMERCIAL

## 2023-08-25 ENCOUNTER — TELEPHONE (OUTPATIENT)
Dept: NEUROLOGY | Facility: CLINIC | Age: 33
End: 2023-08-25

## 2023-08-25 NOTE — PROGRESS NOTES
MEDICAL ONCOLOGY - ESTABLISHED PATIENT VISIT    Reason for visit: Metastatic breast cancer    Best Contact Phone Number(s): [phone] (home)      Cancer/Stage/TNM:    Cancer Staging   No matching staging information was found for the patient.     Oncology History    No history exists.        HPI:   32 y.o. female with MS on ocrelizumab, prior migraines who presents for further evaluation of a large liver mass seen on CT.  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  No known underlying liver disease.  No HCV, HBV, heavy EtOH use.  No known fatty liver disease. She has been on OCP for 15 years but has been off her OCP for 3 weeks now since finding out about this liver mass.  She had her imaging because of feeling firmness in her RUQ.  She denies pain, weight loss, distension, bleeding.  Grandmother had breast cancer - no other known family history of cancer.    Interim History:  Presents for follow-up for discussion of her biopsy results. She remains asymptomatic.  She is accompanied by her parents and .  She has been off her OCP for about one month.  She has not had a menstrual period. She denies any breast masses.    ROS:   Review of Systems   Constitutional:  Negative for chills, fever, malaise/fatigue and weight loss.   HENT:  Negative for sore throat.    Eyes:  Negative for blurred vision and pain.   Respiratory:  Negative for cough and shortness of breath.    Cardiovascular:  Negative for chest pain and leg swelling.   Gastrointestinal:  Negative for abdominal pain, constipation, diarrhea, nausea and vomiting.   Genitourinary:  Negative for dysuria and frequency.   Musculoskeletal:  Negative for back pain, falls and myalgias.   Skin:  Negative for rash.   Neurological:  Negative for dizziness, weakness and headaches.   Endo/Heme/Allergies:  Does not bruise/bleed easily.   Psychiatric/Behavioral:  Negative for depression. The patient is not  nervous/anxious.        Past Medical History:   Past Medical History:   Diagnosis Date    Herniation of lumbar intervertebral disc     Hypercholesteremia     Multiple sclerosis 01/05/2021    Tachycardia         Past Surgical History:   No past surgical history on file.     Family History:   Family History   Problem Relation Age of Onset    Hypertension Mother     Thyroid disease Mother     Hypertension Father     Hypertension Sister     Hyperlipidemia Sister     Heart disease Maternal Grandfather     Hypertension Paternal Grandmother     Melanoma Neg Hx     Breast cancer Neg Hx     Colon cancer Neg Hx     Ovarian cancer Neg Hx         Social History:   Social History     Tobacco Use    Smoking status: Never    Smokeless tobacco: Never   Substance Use Topics    Alcohol use: Yes     Alcohol/week: 1.0 standard drink of alcohol     Types: 1 Glasses of wine per week        I have reviewed and updated the patient's past medical, surgical, family and social histories.    Allergies:   Review of patient's allergies indicates:   Allergen Reactions    Lavender Rash        Medications:   Current Outpatient Medications   Medication Sig Dispense Refill    benzocaine-menthoL 6-10 mg lozenge Take 1 lozenge by mouth every 2 (two) hours as needed for Pain. 18 tablet 0    hydrocortisone 2.5 % cream Apply topically 2 (two) times daily. 28 g 1    ID NOW COVID-19 TEST KIT Kit TEST AS DIRECTED TODAY      levocetirizine (XYZAL) 5 MG tablet Take 1 tablet (5 mg total) by mouth every evening. (Patient not taking: Reported on 8/10/2023) 30 tablet 3    LO LOESTRIN FE 1 mg-10 mcg (24)/10 mcg (2) Tab Take 1 tablet by mouth once daily. 84 tablet 1    ocrelizumab (OCREVUS IV) Inject into the vein every 6 (six) months.      sumatriptan (IMITREX) 50 MG tablet Take 1 tablet at onset of headache; may take 2nd tablet 2 hours later if needed, but not to exceed two tabs per day. 9 tablet 0    triamcinolone acetonide 0.1% (KENALOG) 0.1 % cream Apply to  "affected areas of body BID prn rash. Do not use on face, underarms, or groin. 80 g 3     No current facility-administered medications for this visit.        Physical Exam:   /71 (BP Location: Left arm, Patient Position: Sitting, BP Method: Large (Automatic))   Pulse 105   Resp 18   Ht 5' 5" (1.651 m)   Wt 71.1 kg (156 lb 12 oz)   SpO2 98%   BMI 26.08 kg/m²      ECOG Performance status: 0            Physical Exam  Vitals reviewed.   Constitutional:       General: She is not in acute distress.     Appearance: Normal appearance. She is normal weight.   HENT:      Head: Normocephalic and atraumatic.      Right Ear: External ear normal.      Left Ear: External ear normal.      Nose: Nose normal.      Mouth/Throat:      Mouth: Mucous membranes are moist.      Pharynx: Oropharynx is clear. No posterior oropharyngeal erythema.   Eyes:      General: No scleral icterus.     Extraocular Movements: Extraocular movements intact.      Conjunctiva/sclera: Conjunctivae normal.      Pupils: Pupils are equal, round, and reactive to light.   Cardiovascular:      Rate and Rhythm: Normal rate and regular rhythm.      Pulses: Normal pulses.      Heart sounds: Normal heart sounds.   Pulmonary:      Effort: Pulmonary effort is normal.      Breath sounds: Normal breath sounds. No wheezing or rales.   Abdominal:      General: Bowel sounds are normal. There is no distension.      Palpations: Abdomen is soft.      Tenderness: There is no abdominal tenderness.      Comments: Firm hepatomegaly   Musculoskeletal:         General: No swelling. Normal range of motion.      Cervical back: Normal range of motion and neck supple.   Skin:     General: Skin is warm.      Coloration: Skin is not jaundiced.      Findings: No erythema or rash.   Neurological:      General: No focal deficit present.      Mental Status: She is alert and oriented to person, place, and time. Mental status is at baseline.      Gait: Gait normal.   Psychiatric:       "   Mood and Affect: Mood normal.         Behavior: Behavior normal.         Thought Content: Thought content normal.         Judgment: Judgment normal.           Labs:   No results found for this or any previous visit (from the past 48 hour(s)).     I have reviewed the pertinent labs from 8/8/23 which are notable for normal blood counts, mild bilirubin elevation.  AFP < 2, CA 19-9 26.8.    Imaging:       I have personally reviewed the 7/25/23 CT A/P imaging which is notable for large, heterogenous R hepatic lobe mass with hepatomegaly.  No clear extrahepatic lesions/masses    Path:    Final Pathologic Diagnosis Liver, core needle biopsy:   Metastatic carcinoma, consistent with breast origin (ductal carcinoma).   See comment.    Comment: Interp By Lucas Aguilar M.D., Signed on 08/24/2023 at 14:12   Gross Patient ID/MRN:  [id number]   Pathology label MRN:  [id number]     The specimen is received in formalin labeled &quot;liver Bx&quot;.  The specimen consists of multiple pink-white to pink-red cores of soft tissue measuring 1.0 x 0.9 x 0.2 cm in aggregate.  The specimen is submitted entirely in cassette [id number]   and [id number]     Mariluz Duarte MS   Grossing Technologist    Comment Immunohistochemical stains are performed on block 1B with appropriate controls.  The tumor cells are positive for GATA3, while negative for chromogranin, synaptophysin, and CDX2.  The immunoprofile supports the above interpretation.  Breast cancer   biomarker studies are as follows.     Estrogen receptor:  Positive (strong, greater than 90%)   Progesterone receptor:  Positive (moderate, 70-80%)   HER2 by IHC:  Negative (score 1+)   Ki-67 highlights a &quot;hot spot&quot; proliferative index of approximately 40%        Assessment:       1. Malignant neoplasm of left breast in female, estrogen receptor positive, unspecified site of breast    2. Metastasis to liver    3. Liver lesion    4. Multiple sclerosis    5. Migraine without  aura and without status migrainosus, not intractable            Plan:             # Liver mass  Noted on imaging from 7/25/23.  She has a large, heterogenous, infiltrative mass centered in the R hepatic lobe.  We discussed her case at liver conference 8/15/23 with initial concern for possible infiltrative HCC.  Obviously this would have been unusual in the case of her lack of clear underlying liver disease.  This raised concern for atypical variant such as fibrolamellar HCC.  Alternative diagnoses include atypical FNH, large atypical adenoma associated with her OCP vs other malignancy.  I discussed all of these possibilities with her and the implications of potential diagnoses at her initial consultation.    She underwent liver biopsy and Y-90 mapping on 8/21/23 with IR.    Her pathology returned today and resulted with findings of metastatic breast carcinoma, ER/WY +, HER-2 1+.  I discussed the implications of these findings with her and briefly discussed the prognosis and treatment options which are likely to be endocrine therapy + possible CKD4/6 inhibitor.  I discussed with one of my colleagues, Dr. Ornelas of breast medical oncology who will kindly assume care for Ms. Garcia.  The patient and family are in agreement with this plan.    # MS  Follows with Dr. Arroyo.  Taking ocrelizumab.    # Migraines  Mostly resolved at this time.    Follow up: no formal follow-up with me.    The above information has been reviewed with the patient and all questions have been answered to their apparent satisfaction.  They understand that they can call the clinic with any questions.    Moe Knutson MD  Hematology/Oncology  [facility]    Route Chart for Scheduling    Med Onc Chart Routing      Follow up with physician No follow up needed. will be following up with Dr. Ornelas   Follow up with MARU    Infusion scheduling note    Injection scheduling note    Labs    Imaging    Pharmacy  appointment    Other referrals                Therapy Plan Information  Pre-Medications  acetaminophen tablet 1,000 mg  1,000 mg, Oral, Every visit  diphenhydrAMINE (BENADRYL) 50 mg in NS 50 mL IVPB  50 mg, Intravenous, Every visit  famotidine (PF) injection 20 mg  20 mg, Intravenous, Every visit  methylPREDNISolone sodium succinate (SOLU-MEDROL) 100 mg in dextrose 5 % (D5W) 100 mL IVPB  100 mg, Intravenous, Every visit  Flushes  sodium chloride 0.9% flush 10 mL  10 mL, Intravenous, Every visit  heparin, porcine (PF) 100 unit/mL injection flush 500 Units  500 Units, Intravenous, Every visit  alteplase injection 2 mg  2 mg, Intra-Catheter, Every visit  sodium chloride 0.9% 250 mL flush bag  Intravenous, Every visit  PRN Medications  EPINEPHrine (EPIPEN) 0.3 mg/0.3 mL pen injection 0.3 mg  0.3 mg, Intramuscular, Every visit  diphenhydrAMINE injection 50 mg  50 mg, Intravenous, Every visit  hydrocortisone sodium succinate injection 100 mg  100 mg, Intravenous, Every visit

## 2023-08-28 ENCOUNTER — TELEPHONE (OUTPATIENT)
Dept: INTERVENTIONAL RADIOLOGY/VASCULAR | Facility: CLINIC | Age: 33
End: 2023-08-28
Payer: COMMERCIAL

## 2023-08-28 ENCOUNTER — PATIENT MESSAGE (OUTPATIENT)
Dept: HEMATOLOGY/ONCOLOGY | Facility: CLINIC | Age: 33
End: 2023-08-28
Payer: COMMERCIAL

## 2023-08-28 NOTE — TELEPHONE ENCOUNTER
Attempted to call to discuss groin site and evaluate if bruising and sore complaints have improved. Phone number not working.     Elaine Elmore PA-C  Interventional Radiology  [phone]

## 2023-09-15 ENCOUNTER — TELEPHONE (OUTPATIENT)
Dept: PSYCHIATRY | Facility: CLINIC | Age: 33
End: 2023-09-15
Payer: COMMERCIAL

## 2023-09-15 ENCOUNTER — PATIENT MESSAGE (OUTPATIENT)
Dept: NEUROLOGY | Facility: CLINIC | Age: 33
End: 2023-09-15
Payer: COMMERCIAL

## 2023-09-15 DIAGNOSIS — G35 MULTIPLE SCLEROSIS: Primary | ICD-10-CM

## 2023-09-25 ENCOUNTER — PATIENT MESSAGE (OUTPATIENT)
Dept: NEUROLOGY | Facility: CLINIC | Age: 33
End: 2023-09-25
Payer: COMMERCIAL

## 2023-09-25 NOTE — TELEPHONE ENCOUNTER
Sw received [facility] paperwork, reviewed w/pt's provider, Leigh Cline. APRN, and faxed to [phone]

## 2023-09-29 ENCOUNTER — TELEPHONE (OUTPATIENT)
Dept: NEUROLOGY | Facility: CLINIC | Age: 33
End: 2023-09-29
Payer: COMMERCIAL

## 2023-09-29 ENCOUNTER — TELEPHONE (OUTPATIENT)
Dept: HEMATOLOGY/ONCOLOGY | Facility: CLINIC | Age: 33
End: 2023-09-29
Payer: COMMERCIAL

## 2023-09-29 NOTE — TELEPHONE ENCOUNTER
"----- Message from Brent Mayen RN sent at 9/29/2023 10:51 AM CDT -----  Regarding: RE: Breast ca with mets  Per the pt request, I had her scheduled 2 different times, she cancelled both.    ----- Message -----  From: Jacqui Florence RN  Sent: 9/29/2023  10:45 AM CDT  To: Brent Mayen RN  Subject: Breast ca with mets                              Brent,  Pt suppose to see Dr Ornelas, neil Osman  ----- Message -----  From: Sergey Negrete  Sent: 9/29/2023  10:32 AM CDT  To: [facility] Staff    Consult/Advisory:          Name Of Caller: Self      Contact Preference?: [phone]       Provider Name: Eamon      Does patient feel the need to be seen today? No      What is the nature of the call?: Inquiring if she can use orders from her [facility] provider at the [facility] lab          Additional Notes:  "Thank you for all that you do for our patients"          "

## 2023-09-29 NOTE — TELEPHONE ENCOUNTER
----- Message from Clementina Javy sent at 9/29/2023  3:56 PM CDT -----  Regarding: Consult/Advisory  Contact: [phone]  CONSULT/ADVISORY    Name of Caller: Dr Abdulaziz Campa    Contact Preference: [phone]    Nature of Call: Calling to discuss pts recent dx of breast cancer and the plan of care.

## 2023-10-06 ENCOUNTER — PATIENT MESSAGE (OUTPATIENT)
Dept: NEUROLOGY | Facility: CLINIC | Age: 33
End: 2023-10-06
Payer: COMMERCIAL

## 2023-10-14 NOTE — PROGRESS NOTES
Subjective:          Patient ID: Jessica Garcia is a 33 y.o. female who presents today for a routine clinic visit for MS.  She was last seen in December 2022. The history has been provided by the patient.       MS HPI:  DMT: Ocrevus--last infused in February 2023   Taking vitamin D3 as recommended? Yes   Since last visit, she has been diagnosed with metastic breast cancer (spread to liver). She has a large liver mass, but only a very small spot in the breast.   She is taking Kisqali three times daily (just started Saturday) and Zoladex injections monthly. She is seeing Dr. Campa at [facility]. She is also seeing Dr. Bowling at [facility].  She had a PET scan and there was a small spot on the ovary and the tailbone (patient thinks all on the left). She states that the liver mass is made out of breast cells--Her2 negative. There is no plan at the moment to do chemotherapy and radiation. Her liver function has been good.   She has a rash from the Kisqali right now and has some fatigue.  She will likely be on Kisqali for 5 years. She will have another PET scan in 3 months. The hope of the oncologist is to have a clear scan in 6 months.   She had a cousin on her dad's side who passed away from breast cancer this year.     She denies any obvious signs of relapse or disease progression from an MS standpoint.     Medications:  Current Outpatient Medications   Medication Sig    benzocaine-menthoL 6-10 mg lozenge Take 1 lozenge by mouth every 2 (two) hours as needed for Pain.    hydrocortisone 2.5 % cream Apply topically 2 (two) times daily.    ID NOW COVID-19 TEST KIT Kit TEST AS DIRECTED TODAY    levocetirizine (XYZAL) 5 MG tablet Take 1 tablet (5 mg total) by mouth every evening. (Patient not taking: Reported on 8/10/2023)    LO LOESTRIN FE 1 mg-10 mcg (24)/10 mcg (2) Tab Take 1 tablet by mouth once daily.    ocrelizumab (OCREVUS IV) Inject into the vein every 6 (six) months.    sumatriptan (IMITREX) 50 MG tablet Take 1  tablet at onset of headache; may take 2nd tablet 2 hours later if needed, but not to exceed two tabs per day.    triamcinolone acetonide 0.1% (KENALOG) 0.1 % cream Apply to affected areas of body BID prn rash. Do not use on face, underarms, or groin.         SOCIAL HISTORY  Social History     Tobacco Use    Smoking status: Never    Smokeless tobacco: Never   Substance Use Topics    Alcohol use: Yes     Alcohol/week: 1.0 standard drink of alcohol     Types: 1 Glasses of wine per week    Drug use: No       Living arrangements - the patient lives with her           Objective:        1. 25 foot timed walk:      12/9/2022    12:01 AM 10/17/2023    12:01 AM   Timed 25 Foot Walk:   Did patient wear an AFO? No No   Was assistive device used? No No   Time for 25 Foot Walk (seconds) 3.8 3.8   Time for 25 Foot Walk (seconds) 3.76 3.6       Neurologic Exam     Mental Status   Oriented to person, place, and time.   Attention: normal. Concentration: normal.   Speech: speech is normal   Level of consciousness: alert  Knowledge: good.   Normal comprehension.     Cranial Nerves     CN II   Visual acuity: normal    CN III, IV, VI   Extraocular motions are normal.   CN III: no CN III palsy  CN VI: no CN VI palsy  Nystagmus: none     CN V   Right facial sensation deficit: none  Left facial sensation deficit: none    CN VII   Right facial weakness: none  Left facial weakness: none    CN VIII   Hearing: intact    CN IX, X   Palate: symmetric    CN XI   Right sternocleidomastoid strength: normal  Left sternocleidomastoid strength: normal  Right trapezius strength: normal  Left trapezius strength: normal    CN XII   Tongue deviation: none    Motor Exam   Muscle bulk: normal  Overall muscle tone: normal    Strength   Strength 5/5 throughout.   Right neck flexion: 5/5  Left neck flexion: 5/5  Right neck extension: 5/5  Left neck extension: 5/5  Right deltoid: 5/5  Left deltoid: 5/5  Right biceps: 5/5  Left biceps: 5/5  Right triceps:  5/5  Left triceps: 5/5  Right wrist flexion: 5/5  Left wrist flexion: 5/5  Right wrist extension: 5/5  Left wrist extension: 5/5  Right interossei: 5/5  Left interossei: 5/5  Right iliopsoas: 5/5  Left iliopsoas: 5/5  Right quadriceps: 5/5  Left quadriceps: 5/5  Right hamstrin/5  Left hamstrin/5  Right anterior tibial: 5/5  Left anterior tibial: 5/5  Right gastroc: 5/5  Left gastroc: 5/5    Sensory Exam   Right arm vibration: normal  Left arm vibration: normal  Right leg vibration: normal  Left leg vibration: normal    Gait, Coordination, and Reflexes     Gait  Gait: normal    Coordination   Romberg: negative  Finger to nose coordination: normal  Heel to shin coordination: normal  Tandem walking coordination: normal    Tremor   Resting tremor: absent    Reflexes   Right brachioradialis: 2+  Left brachioradialis: 2+  Right biceps: 2+  Left biceps: 2+  Right triceps: 2+  Left triceps: 2+  Right patellar: 2+  Left patellar: 2+  Right achilles: 2+  Left achilles: 2+  Right plantar: normal  Left plantar: normal  She can walk on toes and heels. Normal RSM in UE and LE         Imaging:       Results for orders placed during the hospital encounter of 10/24/22    MRI Brain Demyelinating W W/O Contrast    Impression  1. Overall, no significant change relative to prior MRI of the brain performed 2022.  2. Similar nonenhancing supratentorial and posterior fossa lesions.  No enhancement to suggest active inflammation or demyelination.  3. No typical findings of PML.      Electronically signed by: Trip Borja  Date:    10/24/2022  Time:    08:21    Results for orders placed during the hospital encounter of 22    MRI Cervical Spine Demyelinating W W/O Contrast    Impression  No significant change from prior specifically without evidence for new cord signal abnormality or abnormal intrathecal enhancement to suggest active cervical demyelination.    Please see above for additional details.      Electronically  signed by: Andrés Martinez DO  Date:    02/05/2022  Time:    13:25    Results for orders placed during the hospital encounter of 11/16/20    MRI Thoracic Spine Demyelinating W W/O Contrast    Impression  Brain appears stable from recent examination again demonstrating several scattered T2/FLAIR hyperintense lesions in the supratentorial and infratentorial white matter.  Distribution nonspecific, but remains suspicious for demyelinating disease.  Alternate less common differential considerations to include Lyme disease, prior vascular insult, or other inflammatory process.  No enhancing foci on postcontrast imaging to specifically indicate active demyelination.  Clinical correlation required.    Spinal cord appears within normal limits.  No focal lesions or enhancement to suggest recent or remote demyelination.    1.7 cm T2 hyperintense nodule in the right lobe of the thyroid gland.  Recommend thyroid ultrasound for further characterization.      Electronically signed by: Ariel Nolan MD  Date:    11/17/2020  Time:    07:44        Labs:     Lab Results   Component Value Date    PFSJQALO20HU 71 02/17/2022    XLOXNYPU00YH 82 04/05/2021    XKQVUYFO51JL 42 11/17/2020     Lab Results   Component Value Date    JCVINDEX 3.79 (H) 07/09/2021    JCVANTIBODY POSITIVE (A) 07/09/2021     Lab Results   Component Value Date    RO5BRZYM 82.0 07/09/2021    ABSOLUTECD3 1782 07/09/2021    IP8YQVQL 27.2 07/09/2021    ABSOLUTECD8 590 07/09/2021    NJ0WHDEX 55.4 07/09/2021    ABSOLUTECD4 1205 07/09/2021    LABCD48 2.04 07/09/2021     Lab Results   Component Value Date    WBC 11.50 08/22/2023    RBC 3.57 (L) 08/22/2023    HGB 10.6 (L) 08/22/2023    HCT 33.7 (L) 08/22/2023    MCV 94 08/22/2023    MCH 29.7 08/22/2023    MCHC 31.5 (L) 08/22/2023    RDW 13.5 08/22/2023     08/22/2023    MPV 9.9 08/22/2023    GRAN 7.7 08/22/2023    GRAN 67.2 08/22/2023    LYMPH 2.6 08/22/2023    LYMPH 22.5 08/22/2023    MONO 1.1 (H) 08/22/2023    MONO  9.2 08/22/2023    EOS 0.1 08/22/2023    BASO 0.05 08/22/2023    EOSINOPHIL 0.4 08/22/2023    BASOPHIL 0.4 08/22/2023     Sodium   Date Value Ref Range Status   08/22/2023 142 136 - 145 mmol/L Final     Potassium   Date Value Ref Range Status   08/22/2023 3.4 (L) 3.5 - 5.1 mmol/L Final     Chloride   Date Value Ref Range Status   08/22/2023 106 95 - 110 mmol/L Final     CO2   Date Value Ref Range Status   08/22/2023 23 23 - 29 mmol/L Final     Glucose   Date Value Ref Range Status   08/22/2023 127 (H) 70 - 110 mg/dL Final     BUN   Date Value Ref Range Status   08/22/2023 14 6 - 20 mg/dL Final     Creatinine   Date Value Ref Range Status   08/22/2023 0.7 0.5 - 1.4 mg/dL Final     Calcium   Date Value Ref Range Status   08/22/2023 9.0 8.7 - 10.5 mg/dL Final     Total Protein   Date Value Ref Range Status   08/22/2023 6.9 6.0 - 8.4 g/dL Final     Albumin   Date Value Ref Range Status   08/22/2023 3.4 (L) 3.5 - 5.2 g/dL Final     Total Bilirubin   Date Value Ref Range Status   08/22/2023 0.9 0.1 - 1.0 mg/dL Final     Comment:     For infants and newborns, interpretation of results should be based  on gestational age, weight and in agreement with clinical  observations.    Premature Infant recommended reference ranges:  Up to 24 hours.............<8.0 mg/dL  Up to 48 hours............<12.0 mg/dL  3-5 days..................<15.0 mg/dL  6-29 days.................<15.0 mg/dL       Alkaline Phosphatase   Date Value Ref Range Status   08/22/2023 43 (L) 55 - 135 U/L Final     AST   Date Value Ref Range Status   08/22/2023 38 10 - 40 U/L Final     ALT   Date Value Ref Range Status   08/22/2023 19 10 - 44 U/L Final     Anion Gap   Date Value Ref Range Status   08/22/2023 13 8 - 16 mmol/L Final     eGFR if    Date Value Ref Range Status   02/17/2022 >60.0 >60 mL/min/1.73 m^2 Final     eGFR if non    Date Value Ref Range Status   02/17/2022 >60.0 >60 mL/min/1.73 m^2 Final     Comment:      Calculation used to obtain the estimated glomerular filtration  rate (eGFR) is the CKD-EPI equation.        Lab Results   Component Value Date    HEPBSAG Non-reactive 08/08/2023    HEPBSAB Positive 08/03/2022    HEPBCAB Non-reactive 08/08/2023           MS Impression and Plan:     NEURO MULTIPLE SCLEROSIS IMPRESSION:   MS Status:     Number of relapses in the past year?:  0    Clinical Progression:  Clinically Stable  Plan:     DMT comment:  Given recent breast cancer diagnosis, we will not proceed with Ocrevus as DMT. For now, we will hold MS treatment. We will get new MRIs of brain and c-spine later this month. If there is disease activity, we could consider a mild MS treatment that is not immunosuppressive (glatiramer?). We will check B cells today.        She will follow up with Dr. Arroyo in 3 months.     Total time spent with patient: 37 minutes   Total time spent on encounter: 44 minutes         JUVENAL Javed, CNS

## 2023-10-17 ENCOUNTER — LAB VISIT (OUTPATIENT)
Dept: LAB | Facility: HOSPITAL | Age: 33
End: 2023-10-17
Payer: COMMERCIAL

## 2023-10-17 ENCOUNTER — OFFICE VISIT (OUTPATIENT)
Dept: NEUROLOGY | Facility: CLINIC | Age: 33
End: 2023-10-17
Payer: COMMERCIAL

## 2023-10-17 VITALS
WEIGHT: 153.88 LBS | HEIGHT: 65 IN | SYSTOLIC BLOOD PRESSURE: 101 MMHG | DIASTOLIC BLOOD PRESSURE: 67 MMHG | BODY MASS INDEX: 25.64 KG/M2 | HEART RATE: 82 BPM

## 2023-10-17 DIAGNOSIS — Z71.89 COUNSELING REGARDING GOALS OF CARE: ICD-10-CM

## 2023-10-17 DIAGNOSIS — G35 MULTIPLE SCLEROSIS: Primary | ICD-10-CM

## 2023-10-17 DIAGNOSIS — C78.7 METASTATIC CANCER TO LIVER: ICD-10-CM

## 2023-10-17 DIAGNOSIS — G35 MULTIPLE SCLEROSIS: ICD-10-CM

## 2023-10-17 PROCEDURE — 99215 PR OFFICE/OUTPT VISIT, EST, LEVL V, 40-54 MIN: ICD-10-PCS | Mod: S$GLB,,, | Performed by: CLINICAL NURSE SPECIALIST

## 2023-10-17 PROCEDURE — 3044F HG A1C LEVEL LT 7.0%: CPT | Mod: CPTII,S$GLB,, | Performed by: CLINICAL NURSE SPECIALIST

## 2023-10-17 PROCEDURE — 36415 COLL VENOUS BLD VENIPUNCTURE: CPT | Performed by: CLINICAL NURSE SPECIALIST

## 2023-10-17 PROCEDURE — 3044F PR MOST RECENT HEMOGLOBIN A1C LEVEL <7.0%: ICD-10-PCS | Mod: CPTII,S$GLB,, | Performed by: CLINICAL NURSE SPECIALIST

## 2023-10-17 PROCEDURE — 1159F PR MEDICATION LIST DOCUMENTED IN MEDICAL RECORD: ICD-10-PCS | Mod: CPTII,S$GLB,, | Performed by: CLINICAL NURSE SPECIALIST

## 2023-10-17 PROCEDURE — 3074F SYST BP LT 130 MM HG: CPT | Mod: CPTII,S$GLB,, | Performed by: CLINICAL NURSE SPECIALIST

## 2023-10-17 PROCEDURE — 99999 PR PBB SHADOW E&M-EST. PATIENT-LVL III: CPT | Mod: PBBFAC,,, | Performed by: CLINICAL NURSE SPECIALIST

## 2023-10-17 PROCEDURE — 3078F DIAST BP <80 MM HG: CPT | Mod: CPTII,S$GLB,, | Performed by: CLINICAL NURSE SPECIALIST

## 2023-10-17 PROCEDURE — 3008F PR BODY MASS INDEX (BMI) DOCUMENTED: ICD-10-PCS | Mod: CPTII,S$GLB,, | Performed by: CLINICAL NURSE SPECIALIST

## 2023-10-17 PROCEDURE — 88185 FLOWCYTOMETRY/TC ADD-ON: CPT | Mod: 59 | Performed by: CLINICAL NURSE SPECIALIST

## 2023-10-17 PROCEDURE — 3074F PR MOST RECENT SYSTOLIC BLOOD PRESSURE < 130 MM HG: ICD-10-PCS | Mod: CPTII,S$GLB,, | Performed by: CLINICAL NURSE SPECIALIST

## 2023-10-17 PROCEDURE — 3008F BODY MASS INDEX DOCD: CPT | Mod: CPTII,S$GLB,, | Performed by: CLINICAL NURSE SPECIALIST

## 2023-10-17 PROCEDURE — 99215 OFFICE O/P EST HI 40 MIN: CPT | Mod: S$GLB,,, | Performed by: CLINICAL NURSE SPECIALIST

## 2023-10-17 PROCEDURE — 99999 PR PBB SHADOW E&M-EST. PATIENT-LVL III: ICD-10-PCS | Mod: PBBFAC,,, | Performed by: CLINICAL NURSE SPECIALIST

## 2023-10-17 PROCEDURE — 1159F MED LIST DOCD IN RCRD: CPT | Mod: CPTII,S$GLB,, | Performed by: CLINICAL NURSE SPECIALIST

## 2023-10-17 PROCEDURE — 3078F PR MOST RECENT DIASTOLIC BLOOD PRESSURE < 80 MM HG: ICD-10-PCS | Mod: CPTII,S$GLB,, | Performed by: CLINICAL NURSE SPECIALIST

## 2023-10-17 RX ORDER — ONDANSETRON 8 MG/1
8 TABLET, ORALLY DISINTEGRATING ORAL EVERY 8 HOURS PRN
COMMUNITY
Start: 2023-10-13

## 2023-10-17 RX ORDER — DIAZEPAM 5 MG/1
5 TABLET ORAL
COMMUNITY
Start: 2023-08-29

## 2023-10-17 NOTE — Clinical Note
Sounds like she is not on traditional chemo; she's taking an oral called Lubna, and she will have another PEt scan in 3 months. MRIs for us are at the end of the month. I'm checking B cells today. If there is MRi activity at any point, maybe we could consider Copaxone or something mild. She'll see you back in 3 months.

## 2023-10-18 PROBLEM — C78.7 METASTATIC CANCER TO LIVER: Status: ACTIVE | Noted: 2023-10-18

## 2023-10-19 LAB
PATH REPORT.FINAL DX SPEC: NORMAL
RITUXAN SENSITIVITY (CD20): NORMAL

## 2023-10-24 ENCOUNTER — PATIENT MESSAGE (OUTPATIENT)
Dept: NEUROLOGY | Facility: CLINIC | Age: 33
End: 2023-10-24
Payer: COMMERCIAL

## 2023-10-30 ENCOUNTER — HOSPITAL ENCOUNTER (OUTPATIENT)
Dept: RADIOLOGY | Facility: HOSPITAL | Age: 33
Discharge: HOME | End: 2023-10-30
Attending: CLINICAL NURSE SPECIALIST
Payer: COMMERCIAL

## 2023-10-30 DIAGNOSIS — G35 MULTIPLE SCLEROSIS: ICD-10-CM

## 2023-10-30 PROCEDURE — 70553 MRI BRAIN STEM W/O & W/DYE: CPT | Mod: 26,,, | Performed by: RADIOLOGY

## 2023-10-30 PROCEDURE — A9585 GADOBUTROL INJECTION: HCPCS | Performed by: CLINICAL NURSE SPECIALIST

## 2023-10-30 PROCEDURE — 72156 MRI NECK SPINE W/O & W/DYE: CPT | Mod: TC

## 2023-10-30 PROCEDURE — 25500020 PHARM REV CODE 255: Performed by: CLINICAL NURSE SPECIALIST

## 2023-10-30 PROCEDURE — 70553 MRI BRAIN STEM W/O & W/DYE: CPT | Mod: TC

## 2023-10-30 PROCEDURE — 72156 MRI NECK SPINE W/O & W/DYE: CPT | Mod: 26,,, | Performed by: RADIOLOGY

## 2023-10-30 PROCEDURE — 70553 MRI BRAIN DEMYELINATING W/ WO CONTRAST: ICD-10-PCS | Mod: 26,,, | Performed by: RADIOLOGY

## 2023-10-30 PROCEDURE — 72156 MRI CERVICAL SPINE DEMYELINATING W W/O CONTRAST: ICD-10-PCS | Mod: 26,,, | Performed by: RADIOLOGY

## 2023-10-30 RX ORDER — GADOBUTROL 604.72 MG/ML
7 INJECTION INTRAVENOUS
Status: COMPLETED | OUTPATIENT
Start: 2023-10-30 | End: 2023-10-30

## 2023-10-30 RX ADMIN — GADOBUTROL 7 ML: 604.72 INJECTION INTRAVENOUS at 06:10

## 2023-11-02 ENCOUNTER — PATIENT MESSAGE (OUTPATIENT)
Dept: NEUROLOGY | Facility: CLINIC | Age: 33
End: 2023-11-02
Payer: COMMERCIAL

## 2023-12-08 ENCOUNTER — TELEPHONE (OUTPATIENT)
Dept: NEUROLOGY | Facility: CLINIC | Age: 33
End: 2023-12-08
Payer: COMMERCIAL

## 2023-12-11 ENCOUNTER — OFFICE VISIT (OUTPATIENT)
Dept: NEUROLOGY | Facility: CLINIC | Age: 33
End: 2023-12-11
Payer: COMMERCIAL

## 2023-12-11 ENCOUNTER — PATIENT MESSAGE (OUTPATIENT)
Dept: NEUROLOGY | Facility: CLINIC | Age: 33
End: 2023-12-11

## 2023-12-11 DIAGNOSIS — R53.83 FATIGUE, UNSPECIFIED TYPE: ICD-10-CM

## 2023-12-11 DIAGNOSIS — Z29.89 PROPHYLACTIC IMMUNOTHERAPY: ICD-10-CM

## 2023-12-11 DIAGNOSIS — Z71.89 COUNSELING REGARDING GOALS OF CARE: ICD-10-CM

## 2023-12-11 DIAGNOSIS — G35 MULTIPLE SCLEROSIS: Primary | ICD-10-CM

## 2023-12-11 DIAGNOSIS — C78.7 METASTATIC CANCER TO LIVER: ICD-10-CM

## 2023-12-11 DIAGNOSIS — R42 DIZZINESS: ICD-10-CM

## 2023-12-11 PROCEDURE — 3044F PR MOST RECENT HEMOGLOBIN A1C LEVEL <7.0%: ICD-10-PCS | Mod: CPTII,95,, | Performed by: CLINICAL NURSE SPECIALIST

## 2023-12-11 PROCEDURE — 99213 OFFICE O/P EST LOW 20 MIN: CPT | Mod: 95,,, | Performed by: CLINICAL NURSE SPECIALIST

## 2023-12-11 PROCEDURE — 99213 PR OFFICE/OUTPT VISIT, EST, LEVL III, 20-29 MIN: ICD-10-PCS | Mod: 95,,, | Performed by: CLINICAL NURSE SPECIALIST

## 2023-12-11 PROCEDURE — 3044F HG A1C LEVEL LT 7.0%: CPT | Mod: CPTII,95,, | Performed by: CLINICAL NURSE SPECIALIST

## 2023-12-11 NOTE — PROGRESS NOTES
Subjective:          Patient ID: Jessica Garcia is a 33 y.o. female who presents today for a fit-in virtual visit for MS.  She was last seen in October 2023. The history has been provided by the patient.      The patient location is: her home in [place], [place]   The chief complaint leading to consultation is: MS     Visit type: audiovisual    Face to Face time with patient: 21 minutes   25 minutes of total time spent on the encounter, which includes face to face time and non-face to face time preparing to see the patient (eg, review of tests), Obtaining and/or reviewing separately obtained history, Documenting clinical information in the electronic or other health record, Independently interpreting results (not separately reported) and communicating results to the patient/family/caregiver, or Care coordination (not separately reported).     Each patient to whom he or she provides medical services by telemedicine is:  (1) informed of the relationship between the physician and patient and the respective role of any other health care provider with respect to management of the patient; and (2) notified that he or she may decline to receive medical services by telemedicine and may withdraw from such care at any time.    MS HPI:  DMT: Copaxone--not started yet   Taking vitamin D3 as recommended? Yes   She continues to have dizziness. It is constant, and she is also feeling very fatigued. It's not clear if this is a medication side effect or not, but she did have this as an initial MS symptom in the past.   She will be going to [facility] on 1/12 and wants to ask if they are ok with her taking Copaxone. She is having some concerns about self-injecting and would much prefer an oral therapy.  She will have another PET scan in a month.     Medications:  Current Outpatient Medications   Medication Sig    diazePAM (VALIUM) 5 MG tablet Take 5 mg by mouth as needed.    glatiramer (COPAXONE, GLATOPA) 40 mg/mL injection  Inject 40 mg into the skin 3 (three) times a week.    hydrocortisone 2.5 % cream Apply topically 2 (two) times daily.    ID NOW COVID-19 TEST KIT Kit TEST AS DIRECTED TODAY    ondansetron (ZOFRAN-ODT) 8 MG TbDL Take 8 mg by mouth every 8 (eight) hours as needed.    ribociclib-letrozole 600 mg/day(200 mg x 3)-2.5 mg Tab Take 600 mg by mouth.    triamcinolone acetonide 0.1% (KENALOG) 0.1 % cream Apply to affected areas of body BID prn rash. Do not use on face, underarms, or groin.       SOCIAL HISTORY  Social History     Tobacco Use    Smoking status: Never    Smokeless tobacco: Never   Substance Use Topics    Alcohol use: Yes     Alcohol/week: 1.0 standard drink of alcohol     Types: 1 Glasses of wine per week    Drug use: No       Living arrangements - the patient lives with her               Objective:        1. 25 foot timed walk:      12/9/2022    12:01 AM 10/17/2023    12:01 AM   Timed 25 Foot Walk:   Did patient wear an AFO? No No   Was assistive device used? No No   Time for 25 Foot Walk (seconds) 3.8 3.8   Time for 25 Foot Walk (seconds) 3.76 3.6       Neurologic Exam    Deferred   Imaging:       Results for orders placed during the hospital encounter of 10/30/23    MRI Brain Demyelinating W W/O Contrast    Impression  No significant change from prior.  Essentially stable T2 FLAIR lesion burden throughout the brain parenchyma remains concerning for mild degree of prior demyelinating plaque in light of history.    No evidence for new lesion or enhancing lesion to suggest significant interval or active demyelination.  Clinical correlation and continued follow-up advised.      Electronically signed by: Andrés Martinez DO  Date:    10/31/2023  Time:    13:20    Results for orders placed during the hospital encounter of 10/30/23    MRI Cervical Spine Demyelinating W W/O Contrast    Impression  Interval development of enhancing lesions within the C3 and C5 vertebral bodies as detailed above concerning  for osseous metastatic disease in light of history.    No cervical cord signal abnormality to suggest cervical cord demyelination.  No abnormal intrathecal enhancement.    Clinical correlation and advised.      Electronically signed by: Andrés Martinez DO  Date:    10/31/2023  Time:    11:52      Labs:     Lab Results   Component Value Date    LHHPBVYL64MG 71 02/17/2022    [id number] 82 04/05/2021    IJKGSIHQ78DX 42 11/17/2020       Lab Results   Component Value Date    WBC 11.50 08/22/2023    RBC 3.57 (L) 08/22/2023    HGB 10.6 (L) 08/22/2023    HCT 33.7 (L) 08/22/2023    MCV 94 08/22/2023    MCH 29.7 08/22/2023    MCHC 31.5 (L) 08/22/2023    RDW 13.5 08/22/2023     08/22/2023    MPV 9.9 08/22/2023    GRAN 7.7 08/22/2023    GRAN 67.2 08/22/2023    LYMPH 2.6 08/22/2023    LYMPH 22.5 08/22/2023    MONO 1.1 (H) 08/22/2023    MONO 9.2 08/22/2023    EOS 0.1 08/22/2023    BASO 0.05 08/22/2023    EOSINOPHIL 0.4 08/22/2023    BASOPHIL 0.4 08/22/2023     Sodium   Date Value Ref Range Status   08/22/2023 142 136 - 145 mmol/L Final     Potassium   Date Value Ref Range Status   08/22/2023 3.4 (L) 3.5 - 5.1 mmol/L Final     Chloride   Date Value Ref Range Status   08/22/2023 106 95 - 110 mmol/L Final     CO2   Date Value Ref Range Status   08/22/2023 23 23 - 29 mmol/L Final     Glucose   Date Value Ref Range Status   08/22/2023 127 (H) 70 - 110 mg/dL Final     BUN   Date Value Ref Range Status   08/22/2023 14 6 - 20 mg/dL Final     Creatinine   Date Value Ref Range Status   08/22/2023 0.7 0.5 - 1.4 mg/dL Final     Calcium   Date Value Ref Range Status   08/22/2023 9.0 8.7 - 10.5 mg/dL Final     Total Protein   Date Value Ref Range Status   08/22/2023 6.9 6.0 - 8.4 g/dL Final     Albumin   Date Value Ref Range Status   08/22/2023 3.4 (L) 3.5 - 5.2 g/dL Final     Total Bilirubin   Date Value Ref Range Status   08/22/2023 0.9 0.1 - 1.0 mg/dL Final     Comment:     For infants and newborns, interpretation of results should  be based  on gestational age, weight and in agreement with clinical  observations.    Premature Infant recommended reference ranges:  Up to 24 hours.............<8.0 mg/dL  Up to 48 hours............<12.0 mg/dL  3-5 days..................<15.0 mg/dL  6-29 days.................<15.0 mg/dL       Alkaline Phosphatase   Date Value Ref Range Status   08/22/2023 43 (L) 55 - 135 U/L Final     AST   Date Value Ref Range Status   08/22/2023 38 10 - 40 U/L Final     ALT   Date Value Ref Range Status   08/22/2023 19 10 - 44 U/L Final     Anion Gap   Date Value Ref Range Status   08/22/2023 13 8 - 16 mmol/L Final     eGFR if    Date Value Ref Range Status   02/17/2022 >60.0 >60 mL/min/1.73 m^2 Final     eGFR if non    Date Value Ref Range Status   02/17/2022 >60.0 >60 mL/min/1.73 m^2 Final     Comment:     Calculation used to obtain the estimated glomerular filtration  rate (eGFR) is the CKD-EPI equation.        Lab Results   Component Value Date    HEPBSAG Non-reactive 08/08/2023    HEPBSAB Positive 08/03/2022    HEPBCAB Non-reactive 08/08/2023           MS Impression and Plan:     NEURO MULTIPLE SCLEROSIS IMPRESSION:   MS Status:     Number of relapses in the past year?:  0    Clinical Progression comment:  She has constant dizziness at the moment. It's hard to say if this is MS related, or if this is related to her Kisqali (her cancer treatment). She will proceed with vestibular rehab to see if this helps. She also inquired about recommendations for fatigue. We discussed Co-Q-10, but I encouraged her to clear this with her oncology team.   Plan:     Symptom Management:  No change in symptom management       She prefers to hold on starting Copaxone until she meets with her [facility] team in January. She is having some anxiety about injecting and wants their opinion on possible other options (preferably oral) that could be used to treat her MS. Her local oncologist recommended deferring any  treatment that could potentially suppress her immune system further, leaving Copaxone as one of the few options. If she does ultimately decide to proceed with Copaxone, we can do an injection training in clinic.   She sees Dr. Arroyo in January after her [facility] trip.           JUVENAL Javed, CNS

## 2024-01-19 ENCOUNTER — OFFICE VISIT (OUTPATIENT)
Dept: NEUROLOGY | Facility: CLINIC | Age: 34
End: 2024-01-19
Payer: COMMERCIAL

## 2024-01-19 DIAGNOSIS — G35 MULTIPLE SCLEROSIS: Primary | ICD-10-CM

## 2024-01-19 PROCEDURE — G2211 COMPLEX E/M VISIT ADD ON: HCPCS | Mod: 95,,, | Performed by: STUDENT IN AN ORGANIZED HEALTH CARE EDUCATION/TRAINING PROGRAM

## 2024-01-19 PROCEDURE — 99215 OFFICE O/P EST HI 40 MIN: CPT | Mod: 95,,, | Performed by: STUDENT IN AN ORGANIZED HEALTH CARE EDUCATION/TRAINING PROGRAM

## 2024-01-19 NOTE — PROGRESS NOTES
[facility]  Follow Up Patient Visit Virtual    The patient location is: home  Visit type: Virtual visit with synchronous audio and video    Each patient to whom he or she provides medical services by telemedicine is:  (1) informed of the relationship between the physician and patient and the respective role of any other health care provider with respect to management of the patient; and (2) notified that he or she may decline to receive medical services by telemedicine and may withdraw from such care at any time.      Disease Summary     Principle neurological diagnosis: MS    Date of symptom onset: 2016  Date of diagnosis: 2020  Disease type at diagnosis: CIS  Disease type currently: RRMS  Previous therapy: DMF 1/2021 - 9/2021 (disease breakthrough), Ocrevus Oct 2021 -  Feb 2023 stopped due to breast cancer txd  Current therapy: NA  Last MRI Brain: 10/24/23 stable  Last MRI C-spine: 10/2023  Last MRI T-spine: 11/6/2023  CSF 12/17/20: 8wbc, 56rbc, 51glu, 20pro, 9 unique ocb's, elevated KFLC's  JCV status: 7/2021 3.79  Other relevant labs and tests: NA    Interval history:     Updated breast PET improved. Pending Apr 2024 scan at [facility] to determine further therapy. She's learning to accept her breast cancer diagnosis that it may be chronic.     Her vertigo has improved and she didn't need further PT    Currently no new symptoms from neurological standpoint.     ROS:     SOCIAL HISTORY  Living arrangements - the patient lives with their family.  Social History     Socioeconomic History    Marital status: Single   Occupational History    Occupation: counselor   Tobacco Use    Smoking status: Never    Smokeless tobacco: Never   Substance and Sexual Activity    Alcohol use: Yes     Alcohol/week: 1.0 standard drink of alcohol     Types: 1 Glasses of wine per week    Drug use: No    Sexual activity: Yes     Partners: Male     Birth control/protection: OCP   Other Topics Concern    Are  you pregnant or think you may be? No    Breast-feeding No   Social History Narrative    The patient does not exercise regularly ().      Rates diet as fair.      She is not satisfied with weight.    She does not drink at least 1/2 gallon water daily.    She drinks 1 coffee/tea/caffeine-containing soft drinks daily.    Total sleep time at night is 7-8 hours.    She works 40 hours per week.    She does wear seat belts.    Hobbies include reading, movies.         Social Determinants of Health     Financial Resource Strain: Low Risk  (8/25/2023)    Overall Financial Resource Strain (CARDIA)     Difficulty of Paying Living Expenses: Not hard at all   Food Insecurity: No Food Insecurity (8/25/2023)    Hunger Vital Sign     Worried About Running Out of Food in the Last Year: Never true     Ran Out of Food in the Last Year: Never true   Transportation Needs: No Transportation Needs (8/25/2023)    PRAPARE - Transportation     Lack of Transportation (Medical): No     Lack of Transportation (Non-Medical): No   Recent Concern: Transportation Needs - Unmet Transportation Needs (7/6/2023)    PRAPARE - Transportation     Lack of Transportation (Medical): Yes     Lack of Transportation (Non-Medical): Yes   Physical Activity: Insufficiently Active (12/11/2023)    Exercise Vital Sign     Days of Exercise per Week: 1 day     Minutes of Exercise per Session: 60 min   Stress: No Stress Concern Present (8/25/2023)    [employer] - Occupational Stress Questionnaire     Feeling of Stress : Only a little   Recent Concern: Stress - Stress Concern Present (8/11/2023)    [employer] - Occupational Stress Questionnaire     Feeling of Stress : To some extent   Social Connections: Unknown (8/25/2023)    Social Connection and Isolation Panel [NHANES]     Frequency of Communication with Friends and Family: More than three times a week     Frequency of Social Gatherings with  Friends and Family: More than three times a week     Active Member of Clubs or Organizations: No     Attends Club or Organization Meetings: Never     Marital Status:    Housing Stability: Low Risk  (8/25/2023)    Housing Stability Vital Sign     Unable to Pay for Housing in the Last Year: No     Number of Places Lived in the Last Year: 1     Unstable Housing in the Last Year: No           10/17/2023     1:27 PM   REVIEW OF SYMPTOMS   Do you feel abnormally tired on most days? No   Do you feel you generally sleep well? Yes   Do you have difficulty controlling your bladder?  No   Do you have difficulty controlling your bowels?  No   Do you have frequent muscle cramps, tightness or spasms in your limbs?  No   Do you have new visual symptoms?  No   Do you have worsening difficulty with your memory or thinking? No   Do you have worsening symptoms of anxiety or depression?  No   For patients who walk, Do you have more difficulty walking?  No   Have you fallen since your last visit?  No   For patients who use wheelchairs: Do you have any skin wounds or breakdown? Not Applicable   Do you have difficulty using your hands?  No   Do you have shooting or burning pain? No   Do you have difficulty with sexual function?  No   If you are sexually active, are you using birth control? Y/N  N/A No   Do you often choke when swallowing liquids or solid food?  No   Do you experience worsening symptoms when overheated? No   Do you need any new equipment such as a wheelchair, walker or shower chair? No   Do you receive co-pay financial assistance for your principal MS medicine? No   Would you be interested in participating in an MS research trial in the future? No   For patients on Gilenya, Tecfidera, Aubagio, Rituxan, Ocrevus, Tysabri, Lemtrada or Methotrexate, are you aware that you should NOT receive live virus vaccines?  Yes   Do you feel you have adequate family/friend support?  Yes   Do you have health insurance?   Yes    Are you currently employed? Yes   Do you receive SSDI/SSI?  Not Applicable   Do you use marijuana or cannabis products? No   Have you been diagnosed with a urinary tract infection since your last visit here? No   Have you been diagnosed with a respiratory tract infection since your last visit here? No   Have you been to the emergency room since your last visit here? Yes   Have you been hospitalized since your last visit here?  No         10/17/2023     1:29 PM   FSS SCORE & INTERPRETATION   FSS SCORE  9   FSS SCORE INTERPRETATION May not be suffering from fatigue         10/17/2023     1:29 PM   MS ROSALIE-D SCORE & INTERPRETATION   ROSALIE-D SCORE  0   ROSALIE-D INTERPRETATION  No indication of Depression         10/17/2023     1:28 PM   MS ASHLIE-7 SCORE & INTERPRETATION   ASHLIE-7 SCORE  0   ASHLIE-7 SCORE INTERPRETATION Normal         10/17/2023     1:30 PM   PEQ MS MOS PAIN EFFECTS SCORE & INTERPRETATION   PES SCORE 6   PES SCORE INTERPRETATION Scores can range from 6-30.  Items are scaled so that higher scores indicate a greater impact of pain on a patients mood and behavior.         10/17/2023     1:30 PM   PEQ MS SEXUAL SATISFACTION SCORE & INTERPRETATION   SSS SCORE  16   SSS SCORE INTERPRETATION Scores can range from 4-24.  Higher scores indicate greater problems with sexual satisfaction.         10/17/2023     1:30 PM   MS BLADDER CONTROL SCORE & INTERPRETATION   BLCS SCORE 0   BLCS SCORE INTERPRETATION  Scores can range from 0-22, with higher scores indicating greater bladder control problems.         10/17/2023     1:31 PM   MS BOWEL CONTROL SCORE & INTERPRETATION   BWCS SCORE 0   BWCS SCORE INTERPRETATION Scores can range from 0-26, with higher scores indicating greater bowel control problems.         10/17/2023     1:31 PM   PEQ MS IMPACT OF VISUAL IMPAIRMENT SCORE & INTERPRETATION   MARCUS SCALE SCORE  0   MARCUS SCORE INTERPRETATION Scores can range from 0-15, with higher scores indicating greater impact of visual  problems on daily activites.         10/17/2023     1:31 PM   MS PDQ SCORE & INTERPRETATION   PDQ RETROSPECTIVE MEMORY SUBSCALE 0   PDQ ATTENTION/CONCENTRATION SUBSCALE 0   PDQ PROSPECTIVE MEMORY SUBSCALE 0   PDQ PLANNING/ORGANIZATION SUBSCALE 0   PDQ TOTAL SCORE 0   PDQ SCORE INTERPRETATION Scores can range from 0-80, with higher scores indicating greater perceived cognitive impairment.         10/17/2023     1:32 PM   MSSS SCORE & INTERPRETATION   MSSS TANGIBLE SUPPORT SUBSCALE 100   MSSS EMOTIONAL/INFORMATIONAL SUPPORT SUBSCALE 100   MSSS AFFECTIONATE SUPPORT SUBSCALE 100   MSSS POSITIVE SOCIAL INTERACTION SUBSCALE 100   MSSS TOTAL SCORE 100   MSSS SCORE INTERPRETATION Scores can range from 0-100, with higher scores indicating greater perceived support.         Exam:     Vitals unable to be obtained virtually         In general, the patient is well nourished and appears to be in no acute distress.            Imaging (personally reviewed):       Results for orders placed during the hospital encounter of 10/30/23    MRI Brain Demyelinating W W/O Contrast    Impression  No significant change from prior.  Essentially stable T2 FLAIR lesion burden throughout the brain parenchyma remains concerning for mild degree of prior demyelinating plaque in light of history.    No evidence for new lesion or enhancing lesion to suggest significant interval or active demyelination.  Clinical correlation and continued follow-up advised.      Electronically signed by: Andrés Martinez DO  Date:    10/31/2023  Time:    13:20    Results for orders placed during the hospital encounter of 10/30/23    MRI Cervical Spine Demyelinating W W/O Contrast    Impression  Interval development of enhancing lesions within the C3 and C5 vertebral bodies as detailed above concerning for osseous metastatic disease in light of history.    No cervical cord signal abnormality to suggest cervical cord demyelination.  No abnormal intrathecal  enhancement.    Clinical correlation and advised.      Electronically signed by: Andrés Martinez DO  Date:    10/31/2023  Time:    11:52    Results for orders placed during the hospital encounter of 11/16/20    MRI Thoracic Spine Demyelinating W W/O Contrast    Impression  Brain appears stable from recent examination again demonstrating several scattered T2/FLAIR hyperintense lesions in the supratentorial and infratentorial white matter.  Distribution nonspecific, but remains suspicious for demyelinating disease.  Alternate less common differential considerations to include Lyme disease, prior vascular insult, or other inflammatory process.  No enhancing foci on postcontrast imaging to specifically indicate active demyelination.  Clinical correlation required.    Spinal cord appears within normal limits.  No focal lesions or enhancement to suggest recent or remote demyelination.    1.7 cm T2 hyperintense nodule in the right lobe of the thyroid gland.  Recommend thyroid ultrasound for further characterization.      Electronically signed by: Ariel Nolan MD  Date:    11/17/2020  Time:    07:44      Labs:     Lab Results   Component Value Date    IJSAGRSB41JL 71 02/17/2022    [id number] 82 04/05/2021    [id number] 42 11/17/2020     Lab Results   Component Value Date    JCVINDEX 3.79 (H) 07/09/2021    JCVANTIBODY POSITIVE (A) 07/09/2021     Lab Results   Component Value Date    AW3JMMQG 82.0 07/09/2021    ABSOLUTECD3 1782 07/09/2021    BH0JOCJU 27.2 07/09/2021    ABSOLUTECD8 590 07/09/2021    LD7INTPV 55.4 07/09/2021    ABSOLUTECD4 1205 07/09/2021    LABCD48 2.04 07/09/2021     Lab Results   Component Value Date    WBC 11.50 08/22/2023    RBC 3.57 (L) 08/22/2023    HGB 10.6 (L) 08/22/2023    HCT 33.7 (L) 08/22/2023    MCV 94 08/22/2023    MCH 29.7 08/22/2023    MCHC 31.5 (L) 08/22/2023    RDW 13.5 08/22/2023     08/22/2023    MPV 9.9 08/22/2023    GRAN 7.7 08/22/2023    GRAN 67.2 08/22/2023    LYMPH 2.6  08/22/2023    LYMPH 22.5 08/22/2023    MONO 1.1 (H) 08/22/2023    MONO 9.2 08/22/2023    EOS 0.1 08/22/2023    BASO 0.05 08/22/2023    EOSINOPHIL 0.4 08/22/2023    BASOPHIL 0.4 08/22/2023     Sodium   Date Value Ref Range Status   08/22/2023 142 136 - 145 mmol/L Final     Potassium   Date Value Ref Range Status   08/22/2023 3.4 (L) 3.5 - 5.1 mmol/L Final     Chloride   Date Value Ref Range Status   08/22/2023 106 95 - 110 mmol/L Final     CO2   Date Value Ref Range Status   08/22/2023 23 23 - 29 mmol/L Final     Glucose   Date Value Ref Range Status   08/22/2023 127 (H) 70 - 110 mg/dL Final     BUN   Date Value Ref Range Status   08/22/2023 14 6 - 20 mg/dL Final     Creatinine   Date Value Ref Range Status   08/22/2023 0.7 0.5 - 1.4 mg/dL Final     Calcium   Date Value Ref Range Status   08/22/2023 9.0 8.7 - 10.5 mg/dL Final     Total Protein   Date Value Ref Range Status   08/22/2023 6.9 6.0 - 8.4 g/dL Final     Albumin   Date Value Ref Range Status   08/22/2023 3.4 (L) 3.5 - 5.2 g/dL Final     Total Bilirubin   Date Value Ref Range Status   08/22/2023 0.9 0.1 - 1.0 mg/dL Final     Comment:     For infants and newborns, interpretation of results should be based  on gestational age, weight and in agreement with clinical  observations.    Premature Infant recommended reference ranges:  Up to 24 hours.............<8.0 mg/dL  Up to 48 hours............<12.0 mg/dL  3-5 days..................<15.0 mg/dL  6-29 days.................<15.0 mg/dL       Alkaline Phosphatase   Date Value Ref Range Status   08/22/2023 43 (L) 55 - 135 U/L Final     AST   Date Value Ref Range Status   08/22/2023 38 10 - 40 U/L Final     ALT   Date Value Ref Range Status   08/22/2023 19 10 - 44 U/L Final     Anion Gap   Date Value Ref Range Status   08/22/2023 13 8 - 16 mmol/L Final     eGFR if    Date Value Ref Range Status   02/17/2022 >60.0 >60 mL/min/1.73 m^2 Final     eGFR if non    Date Value Ref Range Status    02/17/2022 >60.0 >60 mL/min/1.73 m^2 Final     Comment:     Calculation used to obtain the estimated glomerular filtration  rate (eGFR) is the CKD-EPI equation.                   Diagnosis/Assessment/Plan:     Multiple Sclerosis  -Assessment: RRMS with mild intraparenchymal lesions only, stable on Ocrevus previously, currently off DMT due to being on therapy for stage IV breast cancer diagnosis. She has minimal disability from MS standpoint   -Imaging: Update MRI brain in May 2024 for surveillance while off DMT   -Disease Modifying Therapies: Pending her breast cancer therapy plan, may consider starting copaxone in the future, check rituxan sensitivity lab, suspect cd20 cells may have repopulated by now    Symptom management  -Continue daily vit D supplement  Lifestyle modifications for brain health discussed.  Return to clinic in May/June 2024        NEURO MULTIPLE SCLEROSIS IMPRESSION:   MS Status:     Number of relapses in the past year?:  0    Clinical Progression:  Clinically Stable    MRI Progression:  Stable  Plan:     DMT:  No change in management    Symptom Management:  No change in symptom management        Total time spent with the patient: 40 minutes, including face to face consultation, chart review and coordination of care, on the day of the visit. This includes face to face time and non-face to face time preparing to see the patient (eg, review of tests), obtaining and/or reviewing separately obtained history, documenting clinical information in the electronic or other health record, independently interpreting resultsand communicating results to the patient/family/caregiver, or care coordination.           Kristin Arroyo MD, MSc  Attending neurologist

## 2024-01-22 ENCOUNTER — PATIENT MESSAGE (OUTPATIENT)
Dept: PSYCHIATRY | Facility: CLINIC | Age: 34
End: 2024-01-22
Payer: COMMERCIAL

## 2024-02-01 ENCOUNTER — TELEPHONE (OUTPATIENT)
Dept: NEUROLOGY | Facility: CLINIC | Age: 34
End: 2024-02-01
Payer: COMMERCIAL

## 2024-02-08 ENCOUNTER — PATIENT MESSAGE (OUTPATIENT)
Dept: NEUROLOGY | Facility: CLINIC | Age: 34
End: 2024-02-08
Payer: COMMERCIAL

## 2024-02-23 ENCOUNTER — LAB VISIT (OUTPATIENT)
Dept: LAB | Facility: HOSPITAL | Age: 34
End: 2024-02-23
Attending: STUDENT IN AN ORGANIZED HEALTH CARE EDUCATION/TRAINING PROGRAM
Payer: COMMERCIAL

## 2024-02-23 DIAGNOSIS — G35 MULTIPLE SCLEROSIS: ICD-10-CM

## 2024-02-23 PROCEDURE — 36415 COLL VENOUS BLD VENIPUNCTURE: CPT | Mod: PO | Performed by: STUDENT IN AN ORGANIZED HEALTH CARE EDUCATION/TRAINING PROGRAM

## 2024-02-23 PROCEDURE — 0361U NEURFLMNT LT CHN DIG IA QUAN: CPT | Performed by: STUDENT IN AN ORGANIZED HEALTH CARE EDUCATION/TRAINING PROGRAM

## 2024-02-23 PROCEDURE — 88185 FLOWCYTOMETRY/TC ADD-ON: CPT | Performed by: STUDENT IN AN ORGANIZED HEALTH CARE EDUCATION/TRAINING PROGRAM

## 2024-02-24 LAB
PATH REPORT.FINAL DX SPEC: NORMAL
RITUXAN SENSITIVITY (CD20): NORMAL

## 2024-02-26 LAB — NEUROFILAMENT LIGHT CHAIN, PLASMA: 5.2 PG/ML

## 2024-03-26 ENCOUNTER — PATIENT MESSAGE (OUTPATIENT)
Dept: PSYCHIATRY | Facility: CLINIC | Age: 34
End: 2024-03-26
Payer: COMMERCIAL

## 2024-03-27 ENCOUNTER — TELEPHONE (OUTPATIENT)
Dept: FAMILY MEDICINE | Facility: CLINIC | Age: 34
End: 2024-03-27
Payer: COMMERCIAL

## 2024-05-02 ENCOUNTER — PATIENT MESSAGE (OUTPATIENT)
Dept: PSYCHIATRY | Facility: CLINIC | Age: 34
End: 2024-05-02
Payer: COMMERCIAL

## 2024-05-13 ENCOUNTER — PATIENT MESSAGE (OUTPATIENT)
Dept: NEUROLOGY | Facility: CLINIC | Age: 34
End: 2024-05-13
Payer: COMMERCIAL

## 2024-05-16 ENCOUNTER — HOSPITAL ENCOUNTER (OUTPATIENT)
Dept: RADIOLOGY | Facility: HOSPITAL | Age: 34
Discharge: HOME | End: 2024-05-16
Attending: STUDENT IN AN ORGANIZED HEALTH CARE EDUCATION/TRAINING PROGRAM
Payer: COMMERCIAL

## 2024-05-16 DIAGNOSIS — G35 MULTIPLE SCLEROSIS: ICD-10-CM

## 2024-05-16 PROCEDURE — 70551 MRI BRAIN STEM W/O DYE: CPT | Mod: 26,,, | Performed by: STUDENT IN AN ORGANIZED HEALTH CARE EDUCATION/TRAINING PROGRAM

## 2024-05-16 PROCEDURE — 70551 MRI BRAIN STEM W/O DYE: CPT | Mod: TC

## 2024-07-25 ENCOUNTER — PATIENT MESSAGE (OUTPATIENT)
Dept: PSYCHIATRY | Facility: CLINIC | Age: 34
End: 2024-07-25
Payer: COMMERCIAL

## 2024-08-05 ENCOUNTER — PATIENT MESSAGE (OUTPATIENT)
Dept: PSYCHIATRY | Facility: CLINIC | Age: 34
End: 2024-08-05
Payer: COMMERCIAL

## 2024-12-16 ENCOUNTER — PATIENT MESSAGE (OUTPATIENT)
Dept: PSYCHIATRY | Facility: CLINIC | Age: 34
End: 2024-12-16
Payer: COMMERCIAL

## 2025-03-07 ENCOUNTER — PATIENT MESSAGE (OUTPATIENT)
Dept: PSYCHIATRY | Facility: CLINIC | Age: 35
End: 2025-03-07
Payer: COMMERCIAL

## 2025-04-03 ENCOUNTER — PATIENT MESSAGE (OUTPATIENT)
Dept: PSYCHIATRY | Facility: CLINIC | Age: 35
End: 2025-04-03
Payer: COMMERCIAL

## 2025-05-13 ENCOUNTER — PATIENT MESSAGE (OUTPATIENT)
Dept: PSYCHIATRY | Facility: CLINIC | Age: 35
End: 2025-05-13
Payer: COMMERCIAL

## 2025-06-19 ENCOUNTER — PATIENT MESSAGE (OUTPATIENT)
Dept: PSYCHIATRY | Facility: CLINIC | Age: 35
End: 2025-06-19
Payer: COMMERCIAL

## 2025-07-30 ENCOUNTER — PATIENT MESSAGE (OUTPATIENT)
Dept: PSYCHIATRY | Facility: CLINIC | Age: 35
End: 2025-07-30
Payer: COMMERCIAL

## 2025-08-01 ENCOUNTER — PATIENT MESSAGE (OUTPATIENT)
Dept: PSYCHIATRY | Facility: CLINIC | Age: 35
End: 2025-08-01
Payer: COMMERCIAL

## 2025-09-04 ENCOUNTER — TELEPHONE (OUTPATIENT)
Dept: INTERNAL MEDICINE | Facility: CLINIC | Age: 35
End: 2025-09-04
Payer: COMMERCIAL